# Patient Record
Sex: FEMALE | Race: WHITE | NOT HISPANIC OR LATINO | Employment: OTHER | URBAN - METROPOLITAN AREA
[De-identification: names, ages, dates, MRNs, and addresses within clinical notes are randomized per-mention and may not be internally consistent; named-entity substitution may affect disease eponyms.]

---

## 2023-10-04 ENCOUNTER — OFFICE VISIT (OUTPATIENT)
Dept: PRIMARY CARE | Facility: CLINIC | Age: 78
End: 2023-10-04
Payer: MEDICARE

## 2023-10-04 VITALS
HEIGHT: 64 IN | HEART RATE: 63 BPM | RESPIRATION RATE: 16 BRPM | DIASTOLIC BLOOD PRESSURE: 90 MMHG | SYSTOLIC BLOOD PRESSURE: 143 MMHG | WEIGHT: 118 LBS | OXYGEN SATURATION: 83 % | BODY MASS INDEX: 20.14 KG/M2 | TEMPERATURE: 96.8 F

## 2023-10-04 DIAGNOSIS — R91.8 LUNG NODULES: ICD-10-CM

## 2023-10-04 DIAGNOSIS — Z11.59 ENCOUNTER FOR HEPATITIS C SCREENING TEST FOR LOW RISK PATIENT: ICD-10-CM

## 2023-10-04 DIAGNOSIS — Z86.711 HISTORY OF PULMONARY EMBOLISM: ICD-10-CM

## 2023-10-04 DIAGNOSIS — E78.2 MIXED HYPERLIPIDEMIA: ICD-10-CM

## 2023-10-04 DIAGNOSIS — J43.9 PULMONARY EMPHYSEMA, UNSPECIFIED EMPHYSEMA TYPE (MULTI): ICD-10-CM

## 2023-10-04 DIAGNOSIS — Z95.5 PRESENCE OF STENT IN CORONARY ARTERY: ICD-10-CM

## 2023-10-04 DIAGNOSIS — F17.210 CIGARETTE SMOKER: ICD-10-CM

## 2023-10-04 DIAGNOSIS — K51.90 ULCERATIVE COLITIS WITHOUT COMPLICATIONS, UNSPECIFIED LOCATION (MULTI): ICD-10-CM

## 2023-10-04 DIAGNOSIS — I10 ESSENTIAL (PRIMARY) HYPERTENSION: Primary | ICD-10-CM

## 2023-10-04 DIAGNOSIS — Z23 NEED FOR VACCINATION: ICD-10-CM

## 2023-10-04 DIAGNOSIS — I25.10 ATHEROSCLEROSIS OF NATIVE CORONARY ARTERY OF NATIVE HEART WITHOUT ANGINA PECTORIS: ICD-10-CM

## 2023-10-04 PROBLEM — J44.9 CHRONIC OBSTRUCTIVE PULMONARY DISEASE, UNSPECIFIED (MULTI): Status: ACTIVE | Noted: 2021-10-11

## 2023-10-04 PROBLEM — S01.111S EYEBROW LACERATION, RIGHT, SEQUELA: Status: RESOLVED | Noted: 2023-10-04 | Resolved: 2023-10-04

## 2023-10-04 PROBLEM — K59.00 CONSTIPATION: Status: RESOLVED | Noted: 2017-05-09 | Resolved: 2023-10-04

## 2023-10-04 PROBLEM — R26.2 DIFFICULTY IN WALKING, NOT ELSEWHERE CLASSIFIED: Status: ACTIVE | Noted: 2021-10-11

## 2023-10-04 PROBLEM — S82.401S FRACTURE TIBIA/FIBULA, RIGHT, SEQUELA: Status: RESOLVED | Noted: 2021-10-11 | Resolved: 2023-10-04

## 2023-10-04 PROBLEM — R06.02 SHORTNESS OF BREATH: Status: ACTIVE | Noted: 2021-10-11

## 2023-10-04 PROBLEM — N20.0 KIDNEY STONES: Status: ACTIVE | Noted: 2017-05-09

## 2023-10-04 PROBLEM — E78.5 HYPERLIPIDEMIA, UNSPECIFIED: Status: ACTIVE | Noted: 2021-10-11

## 2023-10-04 PROBLEM — N20.0 KIDNEY STONES: Status: RESOLVED | Noted: 2017-05-09 | Resolved: 2023-10-04

## 2023-10-04 PROBLEM — R06.02 SHORTNESS OF BREATH: Status: RESOLVED | Noted: 2021-10-11 | Resolved: 2023-10-04

## 2023-10-04 PROBLEM — S82.201S FRACTURE TIBIA/FIBULA, RIGHT, SEQUELA: Status: RESOLVED | Noted: 2021-10-11 | Resolved: 2023-10-04

## 2023-10-04 PROCEDURE — 1159F MED LIST DOCD IN RCRD: CPT | Performed by: FAMILY MEDICINE

## 2023-10-04 PROCEDURE — 3077F SYST BP >= 140 MM HG: CPT | Performed by: FAMILY MEDICINE

## 2023-10-04 PROCEDURE — 99203 OFFICE O/P NEW LOW 30 MIN: CPT | Performed by: FAMILY MEDICINE

## 2023-10-04 PROCEDURE — 3080F DIAST BP >= 90 MM HG: CPT | Performed by: FAMILY MEDICINE

## 2023-10-04 RX ORDER — METOPROLOL TARTRATE 50 MG/1
50 TABLET ORAL 2 TIMES DAILY
COMMUNITY

## 2023-10-04 RX ORDER — ISOSORBIDE MONONITRATE 30 MG/1
30 TABLET, EXTENDED RELEASE ORAL DAILY
COMMUNITY

## 2023-10-04 RX ORDER — BUDESONIDE 3 MG/1
3 CAPSULE, COATED PELLETS ORAL 3 TIMES DAILY
COMMUNITY
End: 2023-11-09 | Stop reason: SDUPTHER

## 2023-10-04 RX ORDER — PRAVASTATIN SODIUM 40 MG/1
40 TABLET ORAL DAILY
COMMUNITY

## 2023-10-04 RX ORDER — MULTIVITAMIN
1 TABLET ORAL
COMMUNITY
End: 2023-12-14 | Stop reason: ENTERED-IN-ERROR

## 2023-10-04 RX ORDER — APIXABAN 5 MG/1
5 TABLET, FILM COATED ORAL 2 TIMES DAILY
COMMUNITY
End: 2023-11-03 | Stop reason: WASHOUT

## 2023-10-04 ASSESSMENT — ENCOUNTER SYMPTOMS
NAUSEA: 0
MYALGIAS: 0
TROUBLE SWALLOWING: 0
POLYDIPSIA: 0
UNEXPECTED WEIGHT CHANGE: 0
SHORTNESS OF BREATH: 0
APPETITE CHANGE: 0
PALPITATIONS: 0
DIARRHEA: 0
FATIGUE: 0
DIZZINESS: 0
POLYPHAGIA: 0
WHEEZING: 1
HEADACHES: 0
DEPRESSION: 0
LOSS OF SENSATION IN FEET: 0
OCCASIONAL FEELINGS OF UNSTEADINESS: 0

## 2023-10-04 NOTE — PROGRESS NOTES
Subjective   Patient ID: Abigail Aquino is a 78 y.o. female who presents for Hospital Follow-up (Establish care).    New patient to practice. Hospital follow up. Hospitalized 8/26 to 8/30. Discharge summary reviewed.     She had Acute on Chronic Respiratory Failure, COPD exacerbation. Was also in CHF> Not using her home oxygen. She has issues affording things because do not have Part B. She saw counselor yesterday and told her qualify for Medicaid. She has paperwork started. Is to have lung nodule evaluated with PET but does not want to get done until insured.     CAD, Jan 2009 had heart attack and had stent. No heart attacks since then. Did have some CHF in hospital. Echo done August 2023. Does not have a cardiologist currently. Echo was normal.     Had blood clots after broke leg. She is on Eliquis for that. This was in October 2021. She has not been taken off. CT chest 10/6/21 no PE and CT chest 8/27/23 no clot.                        Current Outpatient Medications:     albuterol 90 mcg/actuation inhaler, INHALE 2 PUFFS BY MOUTH FOUR TIMES A DAY AS NEEDED FOR SHORTNESS OF BREATH, Disp: 18 g, Rfl: 1    budesonide EC (Entocort EC) 3 mg 24 hr capsule, Take 1 capsule (3 mg) by mouth 3 times a day., Disp: , Rfl:     Eliquis 5 mg tablet, Take 1 tablet (5 mg) by mouth 2 times a day., Disp: , Rfl:     furosemide (Lasix) 20 mg tablet, TAKE 1 TABLET BY MOUTH ONCE DAILY, Disp: 30 tablet, Rfl: 1    isosorbide mononitrate ER (Imdur) 30 mg 24 hr tablet, Take 1 tablet (30 mg) by mouth once daily., Disp: , Rfl:     metoprolol tartrate (Lopressor) 50 mg tablet, Take 1 tablet by mouth 2 times a day., Disp: , Rfl:     multivitamin tablet, Take 1 tablet by mouth once daily., Disp: , Rfl:     pravastatin (Pravachol) 40 mg tablet, Take 1 tablet (40 mg) by mouth once daily., Disp: , Rfl:     Symbicort 160-4.5 mcg/actuation inhaler, INHALE 2 PUFFS BY MOUTH TWO TIMES A DAY, Disp: 10.2 g, Rfl: 1    Patient Active Problem List  "  Diagnosis    Atherosclerotic heart disease of native coronary artery without angina pectoris    Chronic obstructive pulmonary disease, unspecified (CMS/HCC)    Difficulty in walking, not elsewhere classified    Essential (primary) hypertension    Hyperlipidemia, unspecified    Ulcerative colitis, unspecified, without complications (CMS/HCC)    Presence of stent in coronary artery    History of pulmonary embolism    Cigarette smoker         Review of Systems   Constitutional:  Negative for appetite change, fatigue and unexpected weight change.   HENT:  Negative for trouble swallowing.    Respiratory:  Positive for wheezing. Negative for shortness of breath.    Cardiovascular:  Negative for chest pain, palpitations and leg swelling.   Gastrointestinal:  Negative for diarrhea and nausea.   Endocrine: Negative for cold intolerance, heat intolerance, polydipsia, polyphagia and polyuria.   Musculoskeletal:  Negative for myalgias.   Skin:  Negative for rash.   Neurological:  Negative for dizziness and headaches.       Objective   /90   Pulse 63   Temp 36 °C (96.8 °F)   Resp 16   Ht 1.626 m (5' 4\")   Wt 53.5 kg (118 lb)   SpO2 (!) 83%   BMI 20.25 kg/m²     Physical Exam  Vitals reviewed.   Constitutional:       General: She is not in acute distress.     Appearance: She is not ill-appearing.   Neck:      Vascular: No carotid bruit.   Cardiovascular:      Rate and Rhythm: Normal rate and regular rhythm.      Pulses: Normal pulses.      Heart sounds: No murmur heard.  Pulmonary:      Effort: Pulmonary effort is normal.      Breath sounds: Normal breath sounds.   Musculoskeletal:      Left lower leg: No edema.   Skin:     Findings: No rash.   Neurological:      Mental Status: She is alert.   Psychiatric:         Mood and Affect: Mood normal.         Assessment/Plan   Problem List Items Addressed This Visit       Atherosclerotic heart disease of native coronary artery without angina pectoris     History MI 2009, " severe coronary artery caldicfications on CT chest. No symptoms but does have shortness of breath that has been presumed to be due to Chronic Respiratory Failure with hyposemia. Had elevated BNP and swelling while respiratory failure was decompensated in hospital. Echo showed Normal ejection fraction. Discussed with patient importance of using her oxygen to prevent diastolic CHF/cor pulmonale.     On IsosorbideER 30 mg daily, Furosemide 20 mg daily, Pravastatin. No seen cardiology in one year. Cardiologist was in [place]. She needs new cardiologist.            Relevant Medications    isosorbide mononitrate ER (Imdur) 30 mg 24 hr tablet    metoprolol tartrate (Lopressor) 50 mg tablet    Other Relevant Orders    Referral to Cardiology    Chronic obstructive pulmonary disease, unspecified (CMS/HCC)     Was started on [id number] 2 puffs 2 times a day. She is using it 2 times a day. Helps her breathing. She uses Albuterol 2 puffs 4 times a day as needed. She does need it every day. She sees Karon Thornton in pulmonology.          Essential (primary) hypertension - Primary     On Metoprolol tartrate due to CAD. BP not usually high per patient. Reasses in couple weeks.          Relevant Orders    Referral to Cardiology    Comprehensive Metabolic Panel    Hyperlipidemia, unspecified    Relevant Orders    Referral to Cardiology    Lipid Panel    Comprehensive Metabolic Panel    Ulcerative colitis, unspecified, without complications (CMS/HCC)     Diagnosed 2009, has been on Budesonide. Last GI follow up was in 2016. She has not had follow up. She needs new gastro in local area per patient. Prior was in [place].     She feels Budesonide not working. Has a lot of gas then defecates mucous. She agreed to see GI.          Relevant Orders    Referral to Gastroenterology    Presence of stent in coronary artery    Relevant Orders    Referral to Cardiology    History of pulmonary embolism     Reviewed old records. 10/6/2021,  had CT contrast that showed no PE but did show nodules. Lost to follow up. In Hospital 8/26, repeat CT with increase in lung mass and no PE. She does have issues with falling and has been on Eliquis since after her fibula fracture in 2021. Do not feel she has indication for long term use and risk outweighs benefit.         Cigarette smoker     Started 1956. Has cut back to intermittent only. 2 ppd until recently. 67 times 2 ppd so 124 pack years.  Smoked every day until 5 weeks ago. Now only couple a week. Discussed nicotine replacement. She does not want at this time. Discussed need to keep off it.           Other Visit Diagnoses       Need for vaccination        Has no part B, will go to pharmacy to get on part D.    Encounter for hepatitis C screening test for low risk patient        Relevant Orders    Hepatitis C Antibody              Assessment, plans, tests, and follow up discussed with patient and patient verbalized understanding. Abigail was given an opportunity to ask questions and  any concerns were addressed including but not limited to medications, need for preventive care, importance of Oxygen use. .

## 2023-10-04 NOTE — PATIENT INSTRUCTIONS
Get Prevnar 15 and High dose flu    Get blood tests when can.   Follow up up in 2 months. [facility].     You have referral to GI doctor about ulcerative colitis.   You have referral to Cardiology Dr. Carrillo about heart disease.     OK to stop Eliquis.

## 2023-10-04 NOTE — ASSESSMENT & PLAN NOTE
History MI 2009, severe coronary artery caldicfications on CT chest. No symptoms but does have shortness of breath that has been presumed to be due to Chronic Respiratory Failure with hyposemia. Had elevated BNP and swelling while respiratory failure was decompensated in hospital. Echo showed Normal ejection fraction. Discussed with patient importance of using her oxygen to prevent diastolic CHF/cor pulmonale.     On IsosorbideER 30 mg daily, Furosemide 20 mg daily, Pravastatin. No seen cardiology in one year. Cardiologist was in [place]. She needs new cardiologist.

## 2023-10-04 NOTE — ASSESSMENT & PLAN NOTE
Reviewed old records. 10/6/2021, had CT contrast that showed no PE but did show nodules. Lost to follow up. In Hospital 8/26, repeat CT with increase in lung mass and no PE. She does have issues with falling and has been on Eliquis since after her fibula fracture in 2021. Do not feel she has indication for long term use and risk outweighs benefit.

## 2023-10-04 NOTE — ASSESSMENT & PLAN NOTE
>>ASSESSMENT AND PLAN FOR ESSENTIAL (PRIMARY) HYPERTENSION WRITTEN ON 10/4/2023 11:51 AM BY GUERLINE SMITH MD    On Metoprolol tartrate due to CAD. BP not usually high per patient. Reasses in couple weeks.

## 2023-10-04 NOTE — ASSESSMENT & PLAN NOTE
Started 1956. Has cut back to intermittent only. 2 ppd until recently. 67 times 2 ppd so 124 pack years.  Smoked every day until 5 weeks ago. Now only couple a week. Discussed nicotine replacement. She does not want at this time. Discussed need to keep off it.

## 2023-10-04 NOTE — ASSESSMENT & PLAN NOTE
Was started on [id number] 2 puffs 2 times a day. She is using it 2 times a day. Helps her breathing. She uses Albuterol 2 puffs 4 times a day as needed. She does need it every day. She sees Karon Thornton in pulmonology.

## 2023-10-04 NOTE — ASSESSMENT & PLAN NOTE
Diagnosed 2009, has been on Budesonide. Last GI follow up was in 2016. She has not had follow up. She needs new gastro in local area per patient. Prior was in [place].     She feels Budesonide not working. Has a lot of gas then defecates mucous. She agreed to see GI.

## 2023-10-23 ENCOUNTER — TELEPHONE (OUTPATIENT)
Dept: PRIMARY CARE | Facility: CLINIC | Age: 78
End: 2023-10-23
Payer: MEDICARE

## 2023-10-23 DIAGNOSIS — I25.10 ATHEROSCLEROSIS OF NATIVE CORONARY ARTERY WITHOUT ANGINA PECTORIS, UNSPECIFIED WHETHER NATIVE OR TRANSPLANTED HEART: ICD-10-CM

## 2023-10-23 DIAGNOSIS — J43.9 PULMONARY EMPHYSEMA, UNSPECIFIED EMPHYSEMA TYPE (MULTI): ICD-10-CM

## 2023-10-23 RX ORDER — FUROSEMIDE 20 MG/1
20 TABLET ORAL DAILY
Qty: 90 TABLET | Refills: 0 | Status: SHIPPED | OUTPATIENT
Start: 2023-10-23 | End: 2024-01-23 | Stop reason: SDUPTHER

## 2023-10-23 RX ORDER — BUDESONIDE AND FORMOTEROL FUMARATE DIHYDRATE 160; 4.5 UG/1; UG/1
2 AEROSOL RESPIRATORY (INHALATION) 2 TIMES DAILY
Qty: 3 EACH | Refills: 0 | Status: SHIPPED | OUTPATIENT
Start: 2023-10-23 | End: 2023-11-13 | Stop reason: SDUPTHER

## 2023-10-23 NOTE — TELEPHONE ENCOUNTER
Rx Refill Request Telephone Encounter    Name:  Abigail BRYCE Kenia  :  [id number]  Medication Name:  symbicort   160-4.5 mcg  Route : inhalation  twice a day  Quantity : 90 day supply  2 puffs twice a day  Specific Pharmacy location:  [facility]  Date of last appointment:  Oct 4  Date of next appointment:  Dec 13  Best number to reach patient:  [phone]            Rx Refill Request Telephone Encounter    Name:  Abigail Aquino  :  [id number]  Medication Name:  Furosemide   Dose : 20 mg  Route : oral  Frequency : daily  90    Specific Pharmacy location:    Date of last appointment:    Date of next appointment:    Best number to reach patient:

## 2023-11-03 ENCOUNTER — OFFICE VISIT (OUTPATIENT)
Dept: CARDIOLOGY | Facility: CLINIC | Age: 78
End: 2023-11-03
Payer: MEDICARE

## 2023-11-03 VITALS
SYSTOLIC BLOOD PRESSURE: 116 MMHG | HEART RATE: 67 BPM | DIASTOLIC BLOOD PRESSURE: 68 MMHG | WEIGHT: 119 LBS | BODY MASS INDEX: 20.32 KG/M2 | HEIGHT: 64 IN

## 2023-11-03 DIAGNOSIS — E78.2 MIXED HYPERLIPIDEMIA: ICD-10-CM

## 2023-11-03 DIAGNOSIS — I25.10 ATHEROSCLEROSIS OF NATIVE CORONARY ARTERY OF NATIVE HEART WITHOUT ANGINA PECTORIS: ICD-10-CM

## 2023-11-03 DIAGNOSIS — I10 ESSENTIAL (PRIMARY) HYPERTENSION: ICD-10-CM

## 2023-11-03 DIAGNOSIS — Z95.5 PRESENCE OF STENT IN CORONARY ARTERY: ICD-10-CM

## 2023-11-03 PROCEDURE — 1159F MED LIST DOCD IN RCRD: CPT | Performed by: INTERNAL MEDICINE

## 2023-11-03 PROCEDURE — 1160F RVW MEDS BY RX/DR IN RCRD: CPT | Performed by: INTERNAL MEDICINE

## 2023-11-03 PROCEDURE — 93000 ELECTROCARDIOGRAM COMPLETE: CPT | Performed by: INTERNAL MEDICINE

## 2023-11-03 PROCEDURE — 99203 OFFICE O/P NEW LOW 30 MIN: CPT | Performed by: INTERNAL MEDICINE

## 2023-11-03 PROCEDURE — 3078F DIAST BP <80 MM HG: CPT | Performed by: INTERNAL MEDICINE

## 2023-11-03 PROCEDURE — 3074F SYST BP LT 130 MM HG: CPT | Performed by: INTERNAL MEDICINE

## 2023-11-03 ASSESSMENT — ENCOUNTER SYMPTOMS
SHORTNESS OF BREATH: 1
OCCASIONAL FEELINGS OF UNSTEADINESS: 1
DEPRESSION: 0
LOSS OF SENSATION IN FEET: 0

## 2023-11-03 NOTE — LETTER
November 3, 2023     Vikki Marquez MD  [address]  [address] [place] [id number]    Patient: Abigail Aquino   YOB: 1945   Date of Visit: 11/3/2023       Dear Dr. Vikki Marquez MD:    Thank you for referring Abigail Aquino to me for evaluation. Below are my notes for this consultation.  If you have questions, please do not hesitate to call me. I look forward to following your patient along with you.       Sincerely,     Bobo Carrillo MD      CC: No Recipients  ______________________________________________________________________________________    Counseling:  The patient was counseled regarding diagnostic results, instructions for management, risk factor reductions, prognosis, patient and family education, impressions, risks and benefits of treatment options and importance of compliance with treatment.      Chief Complaint:   The patient presents today for cardiovascular evaluation of HTN, hyperlipidemia and CAD.      History Of Present Illness:    Abigail Aquino is a 78 y.o. female patient whose PMH is significant for CAD s/p PCI x1 in 2009, h/o acute PE in 11/2021, hyperlipidemia, HTN, ulcerative colitis, COPD and lung nodules. She presents today to establish cardiovascular care for the evaluation and management of HTN, hyperlipidemia and CAD. The patient was recently admitted to hospital in 08/2023 for evaluation of SOB. While in hospital, echocardiogram demonstrated an EF of 60-65%, and CT of the chest was negative for PE but with a 2.7 x 1.1 cm spiculated left lower lobe pulmonary nodule (enlarged from 11/15/2021, highly suspicious for malignancy) and mild bronchial wall thickening and mucous impaction versus aspiration in the right lower lobe. The patient states that she previously followed with Dr. MARCEL Chan for management of CAD, but he has since retired. She denies any CP or chest discomfort. She reports SOB and is currently on home oxygen, and is being  "followed by pulmonology with a PET scan pending. The patient is compliant with her prescribed medications.        Last Recorded Vitals:  Vitals:    11/03/23 1559   BP: 116/68   BP Location: Right arm   Pulse: 67   Weight: 54 kg (119 lb)   Height: 1.626 m (5' 4\")       Past Surgical History:  She has a past surgical history that includes Tonsillectomy and Appendectomy.      Social History:  She reports that she quit smoking about 2 months ago. Her smoking use included cigarettes. She has been exposed to tobacco smoke. She has never used smokeless tobacco. She reports that she does not currently use alcohol. She reports that she does not use drugs.    Family History:  Family History   Problem Relation Name Age of Onset   • Cancer Daughter          Allergies:  Atorvastatin    Outpatient Medications:  Current Outpatient Medications   Medication Instructions   • albuterol 90 mcg/actuation inhaler INHALE 2 PUFFS BY MOUTH FOUR TIMES A DAY AS NEEDED FOR SHORTNESS OF BREATH   • budesonide EC (ENTOCORT EC) 3 mg, oral, 3 times daily   • Eliquis 5 mg, oral, 2 times daily   • furosemide (Lasix) 20 mg tablet TAKE 1 TABLET BY MOUTH ONCE DAILY   • isosorbide mononitrate ER (IMDUR) 30 mg, oral, Daily   • metoprolol tartrate (LOPRESSOR) 50 mg, oral, 2 times daily   • multivitamin tablet 1 tablet, oral, Daily RT   • pravastatin (PRAVACHOL) 40 mg, oral, Daily   • Symbicort 160-4.5 mcg/actuation inhaler INHALE 2 PUFFS BY MOUTH TWO TIMES A DAY     Review of Systems   Respiratory:  Positive for shortness of breath.    All other systems reviewed and are negative.     Physical Exam:  Constitutional:       Appearance: Healthy appearance. Not in distress.   Neck:      Vascular: No JVR. JVD normal.   Pulmonary:      Effort: Pulmonary effort is normal.      Breath sounds: Normal breath sounds. No wheezing. No rhonchi. No rales.   Chest:      Chest wall: Not tender to palpatation.   Cardiovascular:      PMI at left midclavicular line. Normal " rate. Regular rhythm. Normal S1. Normal S2.       Murmurs: There is no murmur.      No gallop.  No click. No rub.   Pulses:     Intact distal pulses.   Edema:     Peripheral edema absent.   Abdominal:      General: Bowel sounds are normal.      Palpations: Abdomen is soft.      Tenderness: There is no abdominal tenderness.   Musculoskeletal: Normal range of motion.         General: No tenderness. Skin:     General: Skin is warm and dry.   Neurological:      General: No focal deficit present.      Mental Status: Alert and oriented to person, place and time.       Last Labs:  CBC -  Lab Results   Component Value Date    WBC 8.5 08/28/2023    HGB 15.6 08/28/2023    HCT 48.2 (H) 08/28/2023     (H) 08/28/2023     08/28/2023       CMP -  Lab Results   Component Value Date    CALCIUM 8.4 (L) 08/28/2023    PHOS 2.8 10/11/2021    PROT 5.6 (L) 08/26/2023    ALBUMIN 3.7 08/26/2023    AST 27 08/26/2023    ALT 26 08/26/2023    ALKPHOS 63 08/26/2023    BILITOT 0.7 08/26/2023       RENAL FUNCTION PANEL -   Lab Results   Component Value Date    GLUCOSE 166 (H) 08/28/2023     08/28/2023    K 4.6 08/28/2023     08/28/2023    CO2 37 (H) 08/28/2023    ANIONGAP 8 (L) 08/28/2023    BUN 15 08/28/2023    CREATININE 0.64 08/28/2023    CALCIUM 8.4 (L) 08/28/2023    PHOS 2.8 10/11/2021    ALBUMIN 3.7 08/26/2023        Lab Results   Component Value Date     (H) 08/26/2023       Last Cardiology Tests:  08/28/2023 - TTE  Left ventricular systolic function is normal with a 60-65% estimated ejection fraction.     08/27/2023 - CTA Chest for PE  1. No acute pulmonary embolism.  2. 2.7 x 1.1 cm spiculated left lower lobe pulmonary nodule, enlarged from 11/15/2021, highly suspicious for malignancy.  3. Mild bronchial wall thickening and mucous impaction versus aspiration in the right lower lobe.    11/16/2021 - TTE  The left ventricular systolic function is normal with a 55-60% estimated ejection fraction.      Assessment/Plan  1) CAD   H/O MI in 2009 s/p PCI x1  On metoprolol tartrate 50 mg BID, isosorbide 30 mg daily, Lasix 20 mg daily   Hospital admission 08/2023 for evaluation of SOB  TTE with LVEF 60-65%  CTA chest negative for PE, 2.7 x 1.1 cm spiculated left lower lobe pulmonary nodule (enlarged from 11/15/2021, highly suspicious for malignancy) and mild bronchial wall thickening and mucous impaction versus aspiration in the right lower lobe  Patient denies CP or chest discomfort  Reports SOB - being worked up by pulmonology (see below)  Disability documentation provided   Followup with Lydia Madden NP, in 6 months    2) Hyperlipidemia  On pravastatin 40 mg daily    3) HTN  Stable  On metoprolol tartrate 50 mg daily    4) H/O PE 11/2021  Occurred in the setting of a LE fracture  No longer on Eliquis    5) Lung Nodule  Hospital admission 08/2023 for evaluation of SOB  CTA chest with 2.7 x 1.1 cm spiculated left lower lobe pulmonary nodule (enlarged from 11/15/2021, highly suspicious for malignancy) and mild bronchial wall thickening and mucous impaction versus aspiration in the right lower lobe  Being followed by pulmonology with PET pending  On home oxygen  Advised to followup with pulmonology regarding portable oxygen        Scribe Attestation  By signing my name below, I, Paola Brower   attest that this documentation has been prepared under the direction and in the presence of Bobo Carrillo MD.

## 2023-11-03 NOTE — PATIENT INSTRUCTIONS
Continue all current medications as prescribed.   Dr. Carrillo has recommended that you followup with your pulmonologist regarding your oxygen.   Documentation for a disability placard has been provided to you today.   Followup with Lydia Madden NP, in 6 months.      If you have any questions or cardiac concerns, please call our office at [phone].

## 2023-11-03 NOTE — PROGRESS NOTES
"Counseling:  The patient was counseled regarding diagnostic results, instructions for management, risk factor reductions, prognosis, patient and family education, impressions, risks and benefits of treatment options and importance of compliance with treatment.      Chief Complaint:   The patient presents today for cardiovascular evaluation of HTN, hyperlipidemia and CAD.      History Of Present Illness:    Abigail Aquino is a 78 y.o. female patient whose PMH is significant for CAD s/p PCI x1 in 2009, h/o acute PE in 11/2021, hyperlipidemia, HTN, ulcerative colitis, COPD and lung nodules. She presents today to establish cardiovascular care for the evaluation and management of HTN, hyperlipidemia and CAD. The patient was recently admitted to hospital in 08/2023 for evaluation of SOB. While in hospital, echocardiogram demonstrated an EF of 60-65%, and CT of the chest was negative for PE but with a 2.7 x 1.1 cm spiculated left lower lobe pulmonary nodule (enlarged from 11/15/2021, highly suspicious for malignancy) and mild bronchial wall thickening and mucous impaction versus aspiration in the right lower lobe. The patient states that she previously followed with Dr. MARCEL Chan for management of CAD, but he has since retired. She denies any CP or chest discomfort. She reports SOB and is currently on home oxygen, and is being followed by pulmonology with a PET scan pending. The patient is compliant with her prescribed medications.        Last Recorded Vitals:  Vitals:    11/03/23 1559   BP: 116/68   BP Location: Right arm   Pulse: 67   Weight: 54 kg (119 lb)   Height: 1.626 m (5' 4\")       Past Surgical History:  She has a past surgical history that includes Tonsillectomy and Appendectomy.      Social History:  She reports that she quit smoking about 2 months ago. Her smoking use included cigarettes. She has been exposed to tobacco smoke. She has never used smokeless tobacco. She reports that she does not currently use " alcohol. She reports that she does not use drugs.    Family History:  Family History   Problem Relation Name Age of Onset    Cancer Daughter          Allergies:  Atorvastatin    Outpatient Medications:  Current Outpatient Medications   Medication Instructions    albuterol 90 mcg/actuation inhaler INHALE 2 PUFFS BY MOUTH FOUR TIMES A DAY AS NEEDED FOR SHORTNESS OF BREATH    budesonide EC (ENTOCORT EC) 3 mg, oral, 3 times daily    Eliquis 5 mg, oral, 2 times daily    furosemide (Lasix) 20 mg tablet TAKE 1 TABLET BY MOUTH ONCE DAILY    isosorbide mononitrate ER (IMDUR) 30 mg, oral, Daily    metoprolol tartrate (LOPRESSOR) 50 mg, oral, 2 times daily    multivitamin tablet 1 tablet, oral, Daily RT    pravastatin (PRAVACHOL) 40 mg, oral, Daily    Symbicort 160-4.5 mcg/actuation inhaler INHALE 2 PUFFS BY MOUTH TWO TIMES A DAY     Review of Systems   Respiratory:  Positive for shortness of breath.    All other systems reviewed and are negative.     Physical Exam:  Constitutional:       Appearance: Healthy appearance. Not in distress.   Neck:      Vascular: No JVR. JVD normal.   Pulmonary:      Effort: Pulmonary effort is normal.      Breath sounds: Normal breath sounds. No wheezing. No rhonchi. No rales.   Chest:      Chest wall: Not tender to palpatation.   Cardiovascular:      PMI at left midclavicular line. Normal rate. Regular rhythm. Normal S1. Normal S2.       Murmurs: There is no murmur.      No gallop.  No click. No rub.   Pulses:     Intact distal pulses.   Edema:     Peripheral edema absent.   Abdominal:      General: Bowel sounds are normal.      Palpations: Abdomen is soft.      Tenderness: There is no abdominal tenderness.   Musculoskeletal: Normal range of motion.         General: No tenderness. Skin:     General: Skin is warm and dry.   Neurological:      General: No focal deficit present.      Mental Status: Alert and oriented to person, place and time.       Last Labs:  CBC -  Lab Results   Component Value  Date    WBC 8.5 08/28/2023    HGB 15.6 08/28/2023    HCT 48.2 (H) 08/28/2023     (H) 08/28/2023     08/28/2023       CMP -  Lab Results   Component Value Date    CALCIUM 8.4 (L) 08/28/2023    PHOS 2.8 10/11/2021    PROT 5.6 (L) 08/26/2023    ALBUMIN 3.7 08/26/2023    AST 27 08/26/2023    ALT 26 08/26/2023    ALKPHOS 63 08/26/2023    BILITOT 0.7 08/26/2023       RENAL FUNCTION PANEL -   Lab Results   Component Value Date    GLUCOSE 166 (H) 08/28/2023     08/28/2023    K 4.6 08/28/2023     08/28/2023    CO2 37 (H) 08/28/2023    ANIONGAP 8 (L) 08/28/2023    BUN 15 08/28/2023    CREATININE 0.64 08/28/2023    CALCIUM 8.4 (L) 08/28/2023    PHOS 2.8 10/11/2021    ALBUMIN 3.7 08/26/2023        Lab Results   Component Value Date     (H) 08/26/2023       Last Cardiology Tests:  08/28/2023 - TTE  Left ventricular systolic function is normal with a 60-65% estimated ejection fraction.     08/27/2023 - CTA Chest for PE  1. No acute pulmonary embolism.  2. 2.7 x 1.1 cm spiculated left lower lobe pulmonary nodule, enlarged from 11/15/2021, highly suspicious for malignancy.  3. Mild bronchial wall thickening and mucous impaction versus aspiration in the right lower lobe.    11/16/2021 - TTE  The left ventricular systolic function is normal with a 55-60% estimated ejection fraction.     Assessment/Plan   1) CAD   H/O MI in 2009 s/p PCI x1  On metoprolol tartrate 50 mg BID, isosorbide 30 mg daily, Lasix 20 mg daily   Hospital admission 08/2023 for evaluation of SOB  TTE with LVEF 60-65%  CTA chest negative for PE, 2.7 x 1.1 cm spiculated left lower lobe pulmonary nodule (enlarged from 11/15/2021, highly suspicious for malignancy) and mild bronchial wall thickening and mucous impaction versus aspiration in the right lower lobe  Patient denies CP or chest discomfort  Reports SOB - being worked up by pulmonology (see below)  Disability documentation provided   Followup with Lydia Madden NP, in 6  months    2) Hyperlipidemia  On pravastatin 40 mg daily    3) HTN  Stable  On metoprolol tartrate 50 mg daily    4) H/O PE 11/2021  Occurred in the setting of a LE fracture  No longer on Eliquis    5) Lung Nodule  Hospital admission 08/2023 for evaluation of SOB  CTA chest with 2.7 x 1.1 cm spiculated left lower lobe pulmonary nodule (enlarged from 11/15/2021, highly suspicious for malignancy) and mild bronchial wall thickening and mucous impaction versus aspiration in the right lower lobe  Being followed by pulmonology with PET pending  On home oxygen  Advised to followup with pulmonology regarding portable oxygen        Scribe Attestation  By signing my name below, I, Paola Brower   attest that this documentation has been prepared under the direction and in the presence of Bobo Carrillo MD.

## 2023-11-08 DIAGNOSIS — R91.8 LUNG NODULES: Primary | ICD-10-CM

## 2023-11-08 DIAGNOSIS — C34.90 MALIGNANT NEOPLASM OF LUNG, UNSPECIFIED LATERALITY, UNSPECIFIED PART OF LUNG (MULTI): ICD-10-CM

## 2023-11-09 ENCOUNTER — TELEPHONE (OUTPATIENT)
Dept: PULMONOLOGY | Facility: HOSPITAL | Age: 78
End: 2023-11-09
Payer: MEDICARE

## 2023-11-09 DIAGNOSIS — K51.90 ULCERATIVE COLITIS WITHOUT COMPLICATIONS, UNSPECIFIED LOCATION (MULTI): Primary | ICD-10-CM

## 2023-11-09 DIAGNOSIS — J43.9 PULMONARY EMPHYSEMA, UNSPECIFIED EMPHYSEMA TYPE (MULTI): ICD-10-CM

## 2023-11-09 NOTE — TELEPHONE ENCOUNTER
Rx Refill Request Telephone Encounter    Name:  Abigail Laralamar  :  [id number]  Medication Name:  Budesonide  3 mg  oral  three times a day  270    Specific Pharmacy location:  [facility]  Date of last appointment:  Oct 4  Date of next appointment:  Dec 13  Best number to reach patient:  [phone]

## 2023-11-09 NOTE — TELEPHONE ENCOUNTER
Patient is scheduled for PET scan on 11/17 at 1:15 PM. Patient was given the Prep. Patient acknowledged understanding.     ----- Message from DONOVAN Ley sent at 11/8/2023  1:27 PM EST -----  ordered  ----- Message -----  From: Dana Staples RN  Sent: 11/8/2023   9:13 AM EST  To: DONOVAN Ley    Patient called in stating she got new insurance and would like to proceed with getting the PET scan. Can you place a new order for this?

## 2023-11-13 ENCOUNTER — TELEPHONE (OUTPATIENT)
Dept: PRIMARY CARE | Facility: CLINIC | Age: 78
End: 2023-11-13
Payer: MEDICARE

## 2023-11-13 DIAGNOSIS — J43.9 PULMONARY EMPHYSEMA, UNSPECIFIED EMPHYSEMA TYPE (MULTI): ICD-10-CM

## 2023-11-13 RX ORDER — BUDESONIDE AND FORMOTEROL FUMARATE DIHYDRATE 160; 4.5 UG/1; UG/1
2 AEROSOL RESPIRATORY (INHALATION) 2 TIMES DAILY
Qty: 3 EACH | Refills: 0 | Status: SHIPPED | OUTPATIENT
Start: 2023-11-13 | End: 2023-12-08 | Stop reason: ALTCHOICE

## 2023-11-13 RX ORDER — BUDESONIDE 3 MG/1
3 CAPSULE, COATED PELLETS ORAL 3 TIMES DAILY
Qty: 270 CAPSULE | Refills: 0 | Status: SHIPPED | OUTPATIENT
Start: 2023-11-13 | End: 2024-01-08

## 2023-11-13 NOTE — TELEPHONE ENCOUNTER
Patient stated that she does not see her new GI doctor until the endo this month and she needs this medication asap because she is out

## 2023-11-13 NOTE — TELEPHONE ENCOUNTER
Rx Refill Request Telephone Encounter    Name:  Abigail Aquino  :  [id number]  Medication Name:        Symbicort 160-4.5 mcg/actuation inhaler [[id number]] inhale 2 puffs by mouth two times a day              Specific Pharmacy location:   [facility]  Date of last appointment:  10/04/2023  Date of next appointment:  2023  Best number to reach patient:  [phone]

## 2023-11-17 ENCOUNTER — LAB (OUTPATIENT)
Dept: LAB | Facility: LAB | Age: 78
End: 2023-11-17
Payer: MEDICARE

## 2023-11-17 ENCOUNTER — HOSPITAL ENCOUNTER (OUTPATIENT)
Dept: RADIOLOGY | Facility: HOSPITAL | Age: 78
End: 2023-11-17
Payer: MEDICARE

## 2023-11-17 ENCOUNTER — HOSPITAL ENCOUNTER (OUTPATIENT)
Dept: RADIOLOGY | Facility: HOSPITAL | Age: 78
Discharge: HOME | End: 2023-11-17
Payer: MEDICARE

## 2023-11-17 DIAGNOSIS — I10 ESSENTIAL (PRIMARY) HYPERTENSION: ICD-10-CM

## 2023-11-17 DIAGNOSIS — E78.2 MIXED HYPERLIPIDEMIA: ICD-10-CM

## 2023-11-17 DIAGNOSIS — C34.90 MALIGNANT NEOPLASM OF LUNG, UNSPECIFIED LATERALITY, UNSPECIFIED PART OF LUNG (MULTI): ICD-10-CM

## 2023-11-17 DIAGNOSIS — Z11.59 ENCOUNTER FOR HEPATITIS C SCREENING TEST FOR LOW RISK PATIENT: ICD-10-CM

## 2023-11-17 DIAGNOSIS — R91.8 LUNG NODULES: ICD-10-CM

## 2023-11-17 LAB
ALBUMIN SERPL BCP-MCNC: 4.1 G/DL (ref 3.4–5)
ALP SERPL-CCNC: 58 U/L (ref 33–136)
ALT SERPL W P-5'-P-CCNC: 12 U/L (ref 7–45)
ANION GAP SERPL CALC-SCNC: 12 MMOL/L (ref 10–20)
AST SERPL W P-5'-P-CCNC: 16 U/L (ref 9–39)
BILIRUB SERPL-MCNC: 0.8 MG/DL (ref 0–1.2)
BUN SERPL-MCNC: 9 MG/DL (ref 6–23)
CALCIUM SERPL-MCNC: 9.1 MG/DL (ref 8.6–10.3)
CHLORIDE SERPL-SCNC: 100 MMOL/L (ref 98–107)
CHOLEST SERPL-MCNC: 183 MG/DL (ref 0–199)
CHOLESTEROL/HDL RATIO: 3
CO2 SERPL-SCNC: 33 MMOL/L (ref 21–32)
CREAT SERPL-MCNC: 0.64 MG/DL (ref 0.5–1.05)
GFR SERPL CREATININE-BSD FRML MDRD: 90 ML/MIN/1.73M*2
GLUCOSE SERPL-MCNC: 98 MG/DL (ref 74–99)
HCV AB SER QL: NONREACTIVE
HDLC SERPL-MCNC: 61.4 MG/DL
LDLC SERPL CALC-MCNC: 92 MG/DL
NON HDL CHOLESTEROL: 122 MG/DL (ref 0–149)
POTASSIUM SERPL-SCNC: 3.9 MMOL/L (ref 3.5–5.3)
PROT SERPL-MCNC: 6 G/DL (ref 6.4–8.2)
SODIUM SERPL-SCNC: 141 MMOL/L (ref 136–145)
TRIGL SERPL-MCNC: 148 MG/DL (ref 0–149)
VLDL: 30 MG/DL (ref 0–40)

## 2023-11-17 PROCEDURE — 80061 LIPID PANEL: CPT

## 2023-11-17 PROCEDURE — 86803 HEPATITIS C AB TEST: CPT

## 2023-11-17 PROCEDURE — 80053 COMPREHEN METABOLIC PANEL: CPT

## 2023-11-17 PROCEDURE — 36415 COLL VENOUS BLD VENIPUNCTURE: CPT

## 2023-11-21 ENCOUNTER — APPOINTMENT (OUTPATIENT)
Dept: GASTROENTEROLOGY | Facility: CLINIC | Age: 78
End: 2023-11-21
Payer: MEDICARE

## 2023-11-22 ENCOUNTER — OFFICE VISIT (OUTPATIENT)
Dept: GASTROENTEROLOGY | Facility: CLINIC | Age: 78
End: 2023-11-22
Payer: MEDICARE

## 2023-11-22 VITALS
DIASTOLIC BLOOD PRESSURE: 84 MMHG | HEART RATE: 77 BPM | BODY MASS INDEX: 19.91 KG/M2 | OXYGEN SATURATION: 86 % | SYSTOLIC BLOOD PRESSURE: 142 MMHG | WEIGHT: 116 LBS

## 2023-11-22 DIAGNOSIS — K51.90 ULCERATIVE COLITIS WITHOUT COMPLICATIONS, UNSPECIFIED LOCATION (MULTI): ICD-10-CM

## 2023-11-22 DIAGNOSIS — K52.832 LYMPHOCYTIC COLITIS: Primary | ICD-10-CM

## 2023-11-22 PROCEDURE — 99203 OFFICE O/P NEW LOW 30 MIN: CPT | Performed by: INTERNAL MEDICINE

## 2023-11-22 PROCEDURE — 1159F MED LIST DOCD IN RCRD: CPT | Performed by: INTERNAL MEDICINE

## 2023-11-22 PROCEDURE — 3079F DIAST BP 80-89 MM HG: CPT | Performed by: INTERNAL MEDICINE

## 2023-11-22 PROCEDURE — 1160F RVW MEDS BY RX/DR IN RCRD: CPT | Performed by: INTERNAL MEDICINE

## 2023-11-22 PROCEDURE — 3077F SYST BP >= 140 MM HG: CPT | Performed by: INTERNAL MEDICINE

## 2023-11-22 NOTE — PROGRESS NOTES
[facility] Gastroenterology    ASSESSMENT and PLAN:        Abigail Aquino is a 78 y.o. female patient whose PMH is significant for CAD s/p PCI x1 in 2009, h/o acute PE in 11/2021, hyperlipidemia, HTN, ulcerative colitis, COPD and lung nodules.   who presents for consultation requested by her primary care provider (Vikki Marquez MD) for the evaluation of lymphocytic colitis    Lymphocytic colitis   - diagnosed in 2017   -[facility] note was reviewed patient with lymphocytic colits on biopsty in 2017   -Patient states she has been maintained on 2 tabs of 3 mg budesonide daily for last several years  -She currently states that she is having good bowel moods at this time on minimal budesonide she states she will take it intermittently when she has loose bowel movements  -She was offered colonoscopy at this time she declines endoscopic evaluation.  -Continue with as needed budesonide  -Follow-up in 6 months        Donny Arizmendi,          Gastroenterology    [facility]            Subjective   HISTORY OF PRESENT ILLNESS:     Chief Complaint  Ulcerative Colitis    History Of Present Illness:    Abigail Aquino is a 78 y.o. female with a significant past medical history of COPD, essential hypertension tension, tobacco abuse in the lymphocytic colitis who presents for consultation requested by her primary care provider (Vikki Marquez MD) for the evaluation of lymphocytic colitis.     Patient is a poor historian and there is questionable history of ulcerative colitis however through extensive chart review colonoscopy from 2017 at [facility] was found and pathology report was also found the patient has history of lymphocytic colitis does budesonide for this intermittently over the last several years.      Overall patient has complaints today.  She denies any overt signs of diarrhea she states she will take the budesonide intermittently so patient takes  usually twice a day.      Patient denies any heartburn/GERD, N/V, dysphagia, odynophagia, abdominal pain, diarrhea, constipation, hematemesis, hematochezia, melena, or weight loss.      Endoscopy History:  Colon Biopsies show lymphocytic colitis but patient was constipated. Reassess symptoms     L DIAGNOSIS    1. Polyp, descending colon, biopsy (A) - Hyperplastic polyp.    2. Random colon, biopsy (B) - Lymphocytic colitis pattern of injury.  - See comment.    [place]/jeff 05/24/2017    COMMENT  2. Sections demonstrate colonic mucosa with a patchy, mild increased  intraepithelial lymphocytosis involving mostly surface epithelium. There is  a patchy minimal irregularity to the subepithelial collagen layer. The  histologic findings are nonspecific and may represent changes of  lymphocytic colitis, evolving collagenous colitis, changes associated with  medications (olmesartan, losartan, NSAIDs, immunotherapeutics, etc), celiac  disease, or resolving colitis, among others. Clinical correlation is  recommended.        ROS   I performed a complete 10 point review of systems and it is negative except as noted in HPI or above.        PAST HISTORIES:       Past Medical History:  She has a past medical history of Clotting disorder (CMS/[facility]ly [facility]), COPD (chronic obstructive pulmonary disease) (CMS/[facility]ly [facility]), Eyebrow laceration, right, sequela (10/04/2023), Fracture tibia/fibula, right, sequela (10/11/2021), Hypertension, and Kidney stones (05/09/2017).    Past Surgical History:  She has a past surgical history that includes Tonsillectomy and Appendectomy.      Social History:  She reports that she quit smoking about 2 months ago. Her smoking use included cigarettes. She has been exposed to tobacco smoke. She has never used smokeless tobacco. She reports that she does not currently use alcohol. She reports that she does not use drugs.    Family History:  No known GI disease, specifically denies pancreatitis, Crohn's, colon cancer,  gastroesophageal cancer, or ulcerative colitis.    Family History   Problem Relation Name Age of Onset    Cancer Daughter          Allergies:  Atorvastatin        Objective   OBJECTIVE:       Last Recorded Vitals:  Vitals:    11/22/23 1054 11/22/23 1101   BP: 142/84    Pulse: (!) 148 77   SpO2: (!) 81% (!) 86%   Weight: 52.6 kg (116 lb)      /84   Pulse 77   Wt 52.6 kg (116 lb)   SpO2 (!) 86%   BMI 19.91 kg/m²      Physical Exam:    Physical Exam  Vitals reviewed.   Constitutional:       General: She is awake.      Appearance: Normal appearance.   HENT:      Head: Normocephalic.      Mouth/Throat:      Mouth: Mucous membranes are moist.   Eyes:      Extraocular Movements: Extraocular movements intact.   Cardiovascular:      Rate and Rhythm: Normal rate.      Heart sounds: Normal heart sounds.   Pulmonary:      Effort: Pulmonary effort is normal.      Breath sounds: Wheezing present.   Abdominal:      General: Bowel sounds are normal.      Palpations: Abdomen is soft.      Tenderness: There is no abdominal tenderness. There is no guarding or rebound.      Hernia: No hernia is present.   Musculoskeletal:         General: Normal range of motion.      Cervical back: Neck supple.   Skin:     General: Skin is warm and dry.   Neurological:      General: No focal deficit present.      Mental Status: She is alert.   Psychiatric:         Attention and Perception: Attention and perception normal.         Mood and Affect: Mood normal.         Behavior: Behavior normal.           Home Medications:  Prior to Admission medications    Medication Sig Start Date End Date Taking? Authorizing Provider   albuterol 90 mcg/actuation inhaler INHALE 2 PUFFS BY MOUTH FOUR TIMES A DAY AS NEEDED FOR SHORTNESS OF BREATH 8/30/23 8/29/24  Ole Campos MD   budesonide EC (Entocort EC) 3 mg 24 hr capsule Take 1 capsule (3 mg) by mouth 3 times a day. 11/13/23   Vikki Marquez MD   furosemide (Lasix) 20 mg tablet TAKE 1 TABLET BY  "MOUTH ONCE DAILY 10/23/23 1/21/24  Vikki Marquez MD   isosorbide mononitrate ER (Imdur) 30 mg 24 hr tablet Take 1 tablet (30 mg) by mouth once daily.    Historical Provider, MD   metoprolol tartrate (Lopressor) 50 mg tablet Take 1 tablet by mouth 2 times a day.    Historical Provider, MD   multivitamin tablet Take 1 tablet by mouth once daily.    Historical Provider, MD   pravastatin (Pravachol) 40 mg tablet Take 1 tablet (40 mg) by mouth once daily.    Historical Provider, MD   Symbicort 160-4.5 mcg/actuation inhaler INHALE 2 PUFFS BY MOUTH TWO TIMES A DAY 11/13/23 2/11/24  Vikki Marquez MD         Relevant Results Recent labs reviewed in the EMR.  Lab Results   Component Value Date    HGB 15.6 08/28/2023    HGB 16.1 (H) 08/26/2023    HGB 17.0 (H) 07/13/2023     (H) 08/28/2023    MCV 99 08/26/2023    MCV 98 07/13/2023     08/28/2023     08/26/2023     07/13/2023       No results found for: \"FERRITIN\", \"IRON\"    Lab Results   Component Value Date     11/17/2023    K 3.9 11/17/2023     11/17/2023    BUN 9 11/17/2023    CREATININE 0.64 11/17/2023       Lab Results   Component Value Date    BILITOT 0.8 11/17/2023     Lab Results   Component Value Date    ALT 12 11/17/2023    ALT 26 08/26/2023    ALT 12 07/13/2023    ALT 9 10/11/2021    AST 16 11/17/2023    AST 27 08/26/2023    AST 19 07/13/2023    AST 18 10/11/2021    ALKPHOS 58 11/17/2023    ALKPHOS 63 08/26/2023    ALKPHOS 61 07/13/2023    ALKPHOS 55 10/11/2021       No results found for: \"CRP\"    No results found for: \"CALPS\"    Radiology: Reviewed imaging reviewed in the EMR.  ECG 12 Lead    Result Date: 11/3/2023  See scanned image         "

## 2023-11-22 NOTE — PATIENT INSTRUCTIONS
Continue with budesonide I would recommend two tabs a day and then try to wean down to one a day     Trial of metamucil twice a day a total of 30g of fiber a day     Follow up in 6 months

## 2023-11-24 PROBLEM — K52.832 LYMPHOCYTIC COLITIS: Status: ACTIVE | Noted: 2023-11-24

## 2023-12-08 ENCOUNTER — OFFICE VISIT (OUTPATIENT)
Dept: PULMONOLOGY | Facility: HOSPITAL | Age: 78
End: 2023-12-08
Payer: MEDICARE

## 2023-12-08 VITALS
RESPIRATION RATE: 16 BRPM | TEMPERATURE: 96.8 F | DIASTOLIC BLOOD PRESSURE: 82 MMHG | OXYGEN SATURATION: 96 % | HEIGHT: 64 IN | WEIGHT: 120 LBS | SYSTOLIC BLOOD PRESSURE: 127 MMHG | BODY MASS INDEX: 20.49 KG/M2

## 2023-12-08 DIAGNOSIS — F06.4 ANXIETY DISORDER DUE TO KNOWN PHYSIOLOGICAL CONDITION: ICD-10-CM

## 2023-12-08 DIAGNOSIS — F17.210 TOBACCO DEPENDENCE DUE TO CIGARETTES: ICD-10-CM

## 2023-12-08 DIAGNOSIS — J44.9 CHRONIC OBSTRUCTIVE PULMONARY DISEASE, UNSPECIFIED COPD TYPE (MULTI): ICD-10-CM

## 2023-12-08 DIAGNOSIS — J96.11 CHRONIC RESPIRATORY FAILURE WITH HYPOXIA (MULTI): ICD-10-CM

## 2023-12-08 DIAGNOSIS — R91.1 LUNG NODULE, SOLITARY: Primary | ICD-10-CM

## 2023-12-08 DIAGNOSIS — C34.32 MALIGNANT NEOPLASM OF LOWER LOBE, LEFT BRONCHUS OR LUNG (MULTI): ICD-10-CM

## 2023-12-08 PROCEDURE — 99214 OFFICE O/P EST MOD 30 MIN: CPT | Performed by: INTERNAL MEDICINE

## 2023-12-08 PROCEDURE — 99214 OFFICE O/P EST MOD 30 MIN: CPT | Mod: PO | Performed by: INTERNAL MEDICINE

## 2023-12-08 PROCEDURE — 3074F SYST BP LT 130 MM HG: CPT | Performed by: INTERNAL MEDICINE

## 2023-12-08 PROCEDURE — 3079F DIAST BP 80-89 MM HG: CPT | Performed by: INTERNAL MEDICINE

## 2023-12-08 PROCEDURE — 1160F RVW MEDS BY RX/DR IN RCRD: CPT | Performed by: INTERNAL MEDICINE

## 2023-12-08 PROCEDURE — 1159F MED LIST DOCD IN RCRD: CPT | Performed by: INTERNAL MEDICINE

## 2023-12-08 RX ORDER — ALBUTEROL SULFATE 90 UG/1
2 AEROSOL, METERED RESPIRATORY (INHALATION) 4 TIMES DAILY PRN
Qty: 18 G | Refills: 1 | Status: SHIPPED | OUTPATIENT
Start: 2023-12-08 | End: 2024-12-07

## 2023-12-08 RX ORDER — BUDESONIDE, GLYCOPYRROLATE, AND FORMOTEROL FUMARATE 160; 9; 4.8 UG/1; UG/1; UG/1
2 AEROSOL, METERED RESPIRATORY (INHALATION)
Qty: 24 G | Refills: 11 | Status: SHIPPED | OUTPATIENT
Start: 2023-12-08 | End: 2024-05-09 | Stop reason: HOSPADM

## 2023-12-08 RX ORDER — HYDROXYZINE HYDROCHLORIDE 25 MG/1
TABLET, FILM COATED ORAL
Qty: 2 TABLET | Refills: 2 | Status: SHIPPED | OUTPATIENT
Start: 2023-12-08 | End: 2024-04-11 | Stop reason: ALTCHOICE

## 2023-12-08 RX ORDER — IPRATROPIUM BROMIDE AND ALBUTEROL SULFATE 2.5; .5 MG/3ML; MG/3ML
3 SOLUTION RESPIRATORY (INHALATION) 4 TIMES DAILY PRN
Qty: 360 ML | Refills: 11 | Status: SHIPPED | OUTPATIENT
Start: 2023-12-08 | End: 2023-12-27 | Stop reason: SDUPTHER

## 2023-12-08 ASSESSMENT — ENCOUNTER SYMPTOMS
SHORTNESS OF BREATH: 1
NERVOUS/ANXIOUS: 1
COUGH: 1

## 2023-12-08 NOTE — PROGRESS NOTES
Subjective   Patient ID: Abigail Aquino is a 78 y.o. female who presents for COPD.  HPI  Patient is a 79 y/o female with PMH of PE on Eliquis, COPD, and nicotine dependence. She was referred for an enlarging lung nodule on CT chest. She gets shortness of breath on exertion. She denies a cough on a daily basis. She reports some unintentional weight loss. She is supposed to wear 3L continuous oxygen. She states that she heard herself wheezing prior to starting on oxygen. The nodule was first found in 2021 when she had a PE but did not follow up on testing. She denies any fever, chills, chest pain, or hemoptysis. She quit smoking daily one month ago but also states that she will smoke 1-3 cig per day when stressful, she mostly smoked 1-2 ppd. She denies any family history of lung cancer.     She is here for follow up. She states that her TUBBS is progressing. Denies worsening of cough or chest pain. She appears frustrated because her daughter and son live out of state and she has no help, lives by herself for 25 years. She did not complete PET scan due to anxiety and claustrophobia too. She states she has not smoked at all since Sep 2023. She wants POC but states she cannot carry if more than 2 lbs. She would like to get contact information for [facility].   Review of Systems   Respiratory:  Positive for cough and shortness of breath.    Psychiatric/Behavioral:  The patient is nervous/anxious.    All other systems reviewed and are negative.      Objective   Physical Exam  Vitals and nursing note reviewed.   Constitutional:       Appearance: Normal appearance.   HENT:      Head: Normocephalic.      Nose: Nose normal.      Mouth/Throat:      Pharynx: Oropharynx is clear.   Eyes:      Extraocular Movements: Extraocular movements intact.      Conjunctiva/sclera: Conjunctivae normal.      Pupils: Pupils are equal, round, and reactive to light.   Cardiovascular:      Rate and Rhythm: Normal rate and regular rhythm.       Pulses: Normal pulses.      Heart sounds: Normal heart sounds.   Pulmonary:      Effort: Pulmonary effort is normal.      Breath sounds: Normal breath sounds.   Abdominal:      General: Bowel sounds are normal.      Palpations: Abdomen is soft.   Musculoskeletal:         General: Normal range of motion.      Cervical back: Normal range of motion.   Skin:     General: Skin is warm.   Neurological:      General: No focal deficit present.      Mental Status: She is alert and oriented to person, place, and time. Mental status is at baseline.   Psychiatric:         Mood and Affect: Mood normal.         Behavior: Behavior normal.       Assessment/Plan   1. Lung nodule  2. COPD  3. Nicotine dependence  4. Chronic respiratory failure with hypoxia at 3 L  5. Anxiety disorder     Plan:     Patient was referred for a spiculated lung nodule in the LLL measuring 2.7 cm X 1.1 cm on CT chest done 8/27/23 that has enlarged from 1.0 cm X 1.4 cm. in 2021. Discussed at length this is suspicious for malignancy and she is also high risk due to smoking history. She does report some unintentional weight loss of about 5-10 lbs. Discussed CT at length and recommend PET scan now and discussed potential need for biopsy based on results. Patient is agreeable to plan of care, PET scan ordered and scheduled.      -PET scan.  -PFTs discussed with FEV1 31%,   -DC Symbicort and start Breztri. Continue albuterol prn. Start nebulizer.   -6MWT ordered, she is on 3L continuous oxygen.  -She states that she has mostly smoked 1-2 ppd and started when she was 11. She states one month ago she cut back to 1-3 cigs per day when she has a stressful day. Counseled for 5 min to abstain from smoking. She states she has not however smoked since September.      I provided reassurance to patient. She does not have good social support. I provided help in terms of getting POC home O2 and getting her scan done. She initially wanted PET under anesthesia but cannot go  to [facility]. She would try to do it with sedation. I recommend hydroxyzine for anxiety. Avoid BZD due to severe copd. I will also optimize her copd with triple therapy and will consider theophylline. Add Duonebs prn also.     Educated and counseled. Recommend PET stat and CT guided bx with IR if not able to complete PET first.

## 2023-12-08 NOTE — PATIENT INSTRUCTIONS
Please stop symbicort and start taking Breztri 2 puffs twice daily.  Take albuterol inhaler as needed for shortness of breath and start Nebulizer duonebs as needed as discussed.   Please complete your PET scan and CT guided biopsy as we have discussed today at length including the prognosis.  I have sent order for nebulizer and portable concentrator for you.  I will see you back after the PET scan is completed.

## 2023-12-13 ENCOUNTER — APPOINTMENT (OUTPATIENT)
Dept: PRIMARY CARE | Facility: CLINIC | Age: 78
End: 2023-12-13
Payer: MEDICARE

## 2023-12-14 ENCOUNTER — APPOINTMENT (OUTPATIENT)
Dept: RADIOLOGY | Facility: HOSPITAL | Age: 78
End: 2023-12-14
Payer: MEDICARE

## 2023-12-14 ENCOUNTER — HOSPITAL ENCOUNTER (EMERGENCY)
Facility: HOSPITAL | Age: 78
Discharge: HOME | End: 2023-12-15
Attending: EMERGENCY MEDICINE
Payer: MEDICARE

## 2023-12-14 ENCOUNTER — APPOINTMENT (OUTPATIENT)
Dept: CARDIOLOGY | Facility: HOSPITAL | Age: 78
End: 2023-12-14
Payer: MEDICARE

## 2023-12-14 DIAGNOSIS — J44.1 COPD EXACERBATION (MULTI): ICD-10-CM

## 2023-12-14 DIAGNOSIS — R07.9 CHEST PAIN, UNSPECIFIED TYPE: ICD-10-CM

## 2023-12-14 DIAGNOSIS — Z86.711 HISTORY OF PULMONARY EMBOLISM: ICD-10-CM

## 2023-12-14 DIAGNOSIS — I26.99 OTHER ACUTE PULMONARY EMBOLISM, UNSPECIFIED WHETHER ACUTE COR PULMONALE PRESENT (MULTI): Primary | ICD-10-CM

## 2023-12-14 PROBLEM — C34.92 MALIGNANT NEOPLASM OF LEFT LUNG (MULTI): Status: ACTIVE | Noted: 2023-12-14

## 2023-12-14 LAB
ALBUMIN SERPL BCP-MCNC: 4 G/DL (ref 3.4–5)
ALP SERPL-CCNC: 59 U/L (ref 33–136)
ALT SERPL W P-5'-P-CCNC: 14 U/L (ref 7–45)
ANION GAP BLDV CALCULATED.4IONS-SCNC: 7 MMOL/L (ref 10–25)
ANION GAP SERPL CALC-SCNC: 10 MMOL/L (ref 10–20)
APTT PPP: 26 SECONDS (ref 27–38)
AST SERPL W P-5'-P-CCNC: 18 U/L (ref 9–39)
BASE EXCESS BLDV CALC-SCNC: 4.2 MMOL/L (ref -2–3)
BASOPHILS # BLD AUTO: 0.1 X10*3/UL (ref 0–0.1)
BASOPHILS NFR BLD AUTO: 0.8 %
BILIRUB SERPL-MCNC: 0.6 MG/DL (ref 0–1.2)
BNP SERPL-MCNC: 346 PG/ML (ref 0–99)
BODY TEMPERATURE: 37 DEGREES CELSIUS
BUN SERPL-MCNC: 11 MG/DL (ref 6–23)
CA-I BLDV-SCNC: 1.19 MMOL/L (ref 1.1–1.33)
CALCIUM SERPL-MCNC: 8.9 MG/DL (ref 8.6–10.3)
CARDIAC TROPONIN I PNL SERPL HS: 4 NG/L (ref 0–13)
CARDIAC TROPONIN I PNL SERPL HS: 5 NG/L (ref 0–13)
CARDIAC TROPONIN I PNL SERPL HS: 5 NG/L (ref 0–13)
CHLORIDE BLDV-SCNC: 101 MMOL/L (ref 98–107)
CHLORIDE SERPL-SCNC: 103 MMOL/L (ref 98–107)
CO2 SERPL-SCNC: 30 MMOL/L (ref 21–32)
CREAT SERPL-MCNC: 0.65 MG/DL (ref 0.5–1.05)
EOSINOPHIL # BLD AUTO: 0.19 X10*3/UL (ref 0–0.4)
EOSINOPHIL NFR BLD AUTO: 1.5 %
ERYTHROCYTE [DISTWIDTH] IN BLOOD BY AUTOMATED COUNT: 14 % (ref 11.5–14.5)
GFR SERPL CREATININE-BSD FRML MDRD: 90 ML/MIN/1.73M*2
GLUCOSE BLDV-MCNC: 101 MG/DL (ref 74–99)
GLUCOSE SERPL-MCNC: 92 MG/DL (ref 74–99)
HCO3 BLDV-SCNC: 31.2 MMOL/L (ref 22–26)
HCT VFR BLD AUTO: 47.8 % (ref 36–46)
HCT VFR BLD EST: 49 % (ref 36–46)
HGB BLD-MCNC: 15.9 G/DL (ref 12–16)
HGB BLDV-MCNC: 16.2 G/DL (ref 12–16)
IMM GRANULOCYTES # BLD AUTO: 0.07 X10*3/UL (ref 0–0.5)
IMM GRANULOCYTES NFR BLD AUTO: 0.5 % (ref 0–0.9)
INHALED O2 CONCENTRATION: 32 %
INR PPP: 0.9 (ref 0.9–1.1)
LACTATE BLDV-SCNC: 1.6 MMOL/L (ref 0.4–2)
LYMPHOCYTES # BLD AUTO: 3.55 X10*3/UL (ref 0.8–3)
LYMPHOCYTES NFR BLD AUTO: 27.3 %
MAGNESIUM SERPL-MCNC: 2 MG/DL (ref 1.6–2.4)
MCH RBC QN AUTO: 32.9 PG (ref 26–34)
MCHC RBC AUTO-ENTMCNC: 33.3 G/DL (ref 32–36)
MCV RBC AUTO: 99 FL (ref 80–100)
MONOCYTES # BLD AUTO: 1.2 X10*3/UL (ref 0.05–0.8)
MONOCYTES NFR BLD AUTO: 9.2 %
NEUTROPHILS # BLD AUTO: 7.91 X10*3/UL (ref 1.6–5.5)
NEUTROPHILS NFR BLD AUTO: 60.7 %
NRBC BLD-RTO: 0 /100 WBCS (ref 0–0)
OXYHGB MFR BLDV: 82.3 % (ref 45–75)
PCO2 BLDV: 54 MM HG (ref 41–51)
PH BLDV: 7.37 PH (ref 7.33–7.43)
PLATELET # BLD AUTO: 205 X10*3/UL (ref 150–450)
PO2 BLDV: 53 MM HG (ref 35–45)
POTASSIUM BLDV-SCNC: 4.4 MMOL/L (ref 3.5–5.3)
POTASSIUM SERPL-SCNC: 4.4 MMOL/L (ref 3.5–5.3)
PROT SERPL-MCNC: 6.4 G/DL (ref 6.4–8.2)
PROTHROMBIN TIME: 10.3 SECONDS (ref 9.8–12.8)
RBC # BLD AUTO: 4.84 X10*6/UL (ref 4–5.2)
SAO2 % BLDV: 86 % (ref 45–75)
SODIUM BLDV-SCNC: 135 MMOL/L (ref 136–145)
SODIUM SERPL-SCNC: 139 MMOL/L (ref 136–145)
UFH PPP CHRO-ACNC: 0.7 IU/ML
UFH PPP CHRO-ACNC: 0.8 IU/ML
UFH PPP CHRO-ACNC: 1.2 IU/ML
WBC # BLD AUTO: 13 X10*3/UL (ref 4.4–11.3)

## 2023-12-14 PROCEDURE — 2500000002 HC RX 250 W HCPCS SELF ADMINISTERED DRUGS (ALT 637 FOR MEDICARE OP, ALT 636 FOR OP/ED): Performed by: NURSE PRACTITIONER

## 2023-12-14 PROCEDURE — 85730 THROMBOPLASTIN TIME PARTIAL: CPT | Performed by: EMERGENCY MEDICINE

## 2023-12-14 PROCEDURE — 85610 PROTHROMBIN TIME: CPT | Performed by: EMERGENCY MEDICINE

## 2023-12-14 PROCEDURE — 93005 ELECTROCARDIOGRAM TRACING: CPT

## 2023-12-14 PROCEDURE — 71275 CT ANGIOGRAPHY CHEST: CPT

## 2023-12-14 PROCEDURE — 71045 X-RAY EXAM CHEST 1 VIEW: CPT | Performed by: RADIOLOGY

## 2023-12-14 PROCEDURE — 94640 AIRWAY INHALATION TREATMENT: CPT

## 2023-12-14 PROCEDURE — 96376 TX/PRO/DX INJ SAME DRUG ADON: CPT

## 2023-12-14 PROCEDURE — 83880 ASSAY OF NATRIURETIC PEPTIDE: CPT | Performed by: EMERGENCY MEDICINE

## 2023-12-14 PROCEDURE — 2500000001 HC RX 250 WO HCPCS SELF ADMINISTERED DRUGS (ALT 637 FOR MEDICARE OP): Performed by: EMERGENCY MEDICINE

## 2023-12-14 PROCEDURE — 99285 EMERGENCY DEPT VISIT HI MDM: CPT | Performed by: INTERNAL MEDICINE

## 2023-12-14 PROCEDURE — 2500000004 HC RX 250 GENERAL PHARMACY W/ HCPCS (ALT 636 FOR OP/ED): Performed by: EMERGENCY MEDICINE

## 2023-12-14 PROCEDURE — 85520 HEPARIN ASSAY: CPT | Performed by: INTERNAL MEDICINE

## 2023-12-14 PROCEDURE — 85025 COMPLETE CBC W/AUTO DIFF WBC: CPT | Performed by: EMERGENCY MEDICINE

## 2023-12-14 PROCEDURE — 96375 TX/PRO/DX INJ NEW DRUG ADDON: CPT

## 2023-12-14 PROCEDURE — 85520 HEPARIN ASSAY: CPT | Performed by: EMERGENCY MEDICINE

## 2023-12-14 PROCEDURE — 84132 ASSAY OF SERUM POTASSIUM: CPT | Performed by: EMERGENCY MEDICINE

## 2023-12-14 PROCEDURE — 36415 COLL VENOUS BLD VENIPUNCTURE: CPT | Performed by: EMERGENCY MEDICINE

## 2023-12-14 PROCEDURE — 99285 EMERGENCY DEPT VISIT HI MDM: CPT | Performed by: EMERGENCY MEDICINE

## 2023-12-14 PROCEDURE — 96366 THER/PROPH/DIAG IV INF ADDON: CPT

## 2023-12-14 PROCEDURE — 99215 OFFICE O/P EST HI 40 MIN: CPT | Performed by: NURSE PRACTITIONER

## 2023-12-14 PROCEDURE — 2500000001 HC RX 250 WO HCPCS SELF ADMINISTERED DRUGS (ALT 637 FOR MEDICARE OP): Performed by: INTERNAL MEDICINE

## 2023-12-14 PROCEDURE — 71045 X-RAY EXAM CHEST 1 VIEW: CPT

## 2023-12-14 PROCEDURE — 2500000004 HC RX 250 GENERAL PHARMACY W/ HCPCS (ALT 636 FOR OP/ED): Performed by: INTERNAL MEDICINE

## 2023-12-14 PROCEDURE — 96365 THER/PROPH/DIAG IV INF INIT: CPT

## 2023-12-14 PROCEDURE — 2500000002 HC RX 250 W HCPCS SELF ADMINISTERED DRUGS (ALT 637 FOR MEDICARE OP, ALT 636 FOR OP/ED): Performed by: INTERNAL MEDICINE

## 2023-12-14 PROCEDURE — 83735 ASSAY OF MAGNESIUM: CPT | Performed by: EMERGENCY MEDICINE

## 2023-12-14 PROCEDURE — 2550000001 HC RX 255 CONTRASTS: Performed by: EMERGENCY MEDICINE

## 2023-12-14 PROCEDURE — 99284 EMERGENCY DEPT VISIT MOD MDM: CPT | Performed by: INTERNAL MEDICINE

## 2023-12-14 PROCEDURE — 96374 THER/PROPH/DIAG INJ IV PUSH: CPT | Mod: 59

## 2023-12-14 PROCEDURE — 84484 ASSAY OF TROPONIN QUANT: CPT | Performed by: INTERNAL MEDICINE

## 2023-12-14 PROCEDURE — 71275 CT ANGIOGRAPHY CHEST: CPT | Performed by: RADIOLOGY

## 2023-12-14 PROCEDURE — 84484 ASSAY OF TROPONIN QUANT: CPT | Performed by: EMERGENCY MEDICINE

## 2023-12-14 RX ORDER — BUDESONIDE 3 MG/1
3 CAPSULE, COATED PELLETS ORAL 3 TIMES DAILY
Status: DISCONTINUED | OUTPATIENT
Start: 2023-12-14 | End: 2023-12-15 | Stop reason: HOSPADM

## 2023-12-14 RX ORDER — HEPARIN SODIUM 5000 [USP'U]/ML
80 INJECTION, SOLUTION INTRAVENOUS; SUBCUTANEOUS ONCE
Status: COMPLETED | OUTPATIENT
Start: 2023-12-14 | End: 2023-12-14

## 2023-12-14 RX ORDER — HEPARIN SODIUM 5000 [USP'U]/ML
2000-4000 INJECTION, SOLUTION INTRAVENOUS; SUBCUTANEOUS EVERY 4 HOURS PRN
Status: DISCONTINUED | OUTPATIENT
Start: 2023-12-14 | End: 2023-12-15

## 2023-12-14 RX ORDER — MORPHINE SULFATE 4 MG/ML
4 INJECTION, SOLUTION INTRAMUSCULAR; INTRAVENOUS ONCE AS NEEDED
Status: COMPLETED | OUTPATIENT
Start: 2023-12-14 | End: 2023-12-14

## 2023-12-14 RX ORDER — ONDANSETRON HYDROCHLORIDE 2 MG/ML
4 INJECTION, SOLUTION INTRAVENOUS EVERY 8 HOURS PRN
Status: DISCONTINUED | OUTPATIENT
Start: 2023-12-14 | End: 2023-12-15 | Stop reason: HOSPADM

## 2023-12-14 RX ORDER — ONDANSETRON 4 MG/1
4 TABLET, FILM COATED ORAL EVERY 8 HOURS PRN
Status: DISCONTINUED | OUTPATIENT
Start: 2023-12-14 | End: 2023-12-15 | Stop reason: HOSPADM

## 2023-12-14 RX ORDER — DOXYCYCLINE 100 MG/1
100 CAPSULE ORAL ONCE
Status: COMPLETED | OUTPATIENT
Start: 2023-12-14 | End: 2023-12-14

## 2023-12-14 RX ORDER — ACETAMINOPHEN 160 MG/5ML
650 SOLUTION ORAL EVERY 4 HOURS PRN
Status: DISCONTINUED | OUTPATIENT
Start: 2023-12-14 | End: 2023-12-15 | Stop reason: HOSPADM

## 2023-12-14 RX ORDER — ALBUTEROL SULFATE 90 UG/1
2 AEROSOL, METERED RESPIRATORY (INHALATION) 4 TIMES DAILY PRN
Status: DISCONTINUED | OUTPATIENT
Start: 2023-12-14 | End: 2023-12-15 | Stop reason: HOSPADM

## 2023-12-14 RX ORDER — MULTIVIT-MIN/IRON FUM/FOLIC AC 7.5 MG-4
1 TABLET ORAL DAILY
Status: DISCONTINUED | OUTPATIENT
Start: 2023-12-14 | End: 2023-12-15 | Stop reason: HOSPADM

## 2023-12-14 RX ORDER — POLYETHYLENE GLYCOL 3350 17 G/17G
17 POWDER, FOR SOLUTION ORAL DAILY
Status: DISCONTINUED | OUTPATIENT
Start: 2023-12-14 | End: 2023-12-15 | Stop reason: HOSPADM

## 2023-12-14 RX ORDER — IPRATROPIUM BROMIDE AND ALBUTEROL SULFATE 2.5; .5 MG/3ML; MG/3ML
3 SOLUTION RESPIRATORY (INHALATION) 4 TIMES DAILY PRN
Status: DISCONTINUED | OUTPATIENT
Start: 2023-12-14 | End: 2023-12-15 | Stop reason: HOSPADM

## 2023-12-14 RX ORDER — MORPHINE SULFATE 4 MG/ML
4 INJECTION, SOLUTION INTRAMUSCULAR; INTRAVENOUS ONCE
Status: COMPLETED | OUTPATIENT
Start: 2023-12-14 | End: 2023-12-14

## 2023-12-14 RX ORDER — TRAMADOL HYDROCHLORIDE 50 MG/1
50 TABLET ORAL EVERY 8 HOURS PRN
Status: DISCONTINUED | OUTPATIENT
Start: 2023-12-14 | End: 2023-12-15 | Stop reason: HOSPADM

## 2023-12-14 RX ORDER — BISMUTH SUBSALICYLATE 262 MG
1 TABLET,CHEWABLE ORAL
Status: DISCONTINUED | OUTPATIENT
Start: 2023-12-14 | End: 2023-12-14 | Stop reason: SDUPTHER

## 2023-12-14 RX ORDER — FLUTICASONE FUROATE AND VILANTEROL 200; 25 UG/1; UG/1
1 POWDER RESPIRATORY (INHALATION) DAILY
Status: DISCONTINUED | OUTPATIENT
Start: 2023-12-14 | End: 2023-12-15 | Stop reason: HOSPADM

## 2023-12-14 RX ORDER — PRAVASTATIN SODIUM 40 MG/1
40 TABLET ORAL DAILY
Status: DISCONTINUED | OUTPATIENT
Start: 2023-12-14 | End: 2023-12-15 | Stop reason: HOSPADM

## 2023-12-14 RX ORDER — OXYCODONE HYDROCHLORIDE 5 MG/1
5 TABLET ORAL EVERY 6 HOURS PRN
Status: DISCONTINUED | OUTPATIENT
Start: 2023-12-14 | End: 2023-12-15 | Stop reason: HOSPADM

## 2023-12-14 RX ORDER — ACETAMINOPHEN 650 MG/1
650 SUPPOSITORY RECTAL EVERY 4 HOURS PRN
Status: DISCONTINUED | OUTPATIENT
Start: 2023-12-14 | End: 2023-12-15 | Stop reason: HOSPADM

## 2023-12-14 RX ORDER — FUROSEMIDE 40 MG/1
20 TABLET ORAL DAILY
Status: DISCONTINUED | OUTPATIENT
Start: 2023-12-14 | End: 2023-12-15 | Stop reason: HOSPADM

## 2023-12-14 RX ORDER — ACETAMINOPHEN 325 MG/1
650 TABLET ORAL EVERY 4 HOURS PRN
Status: DISCONTINUED | OUTPATIENT
Start: 2023-12-14 | End: 2023-12-15 | Stop reason: HOSPADM

## 2023-12-14 RX ORDER — ISOSORBIDE MONONITRATE 30 MG/1
30 TABLET, EXTENDED RELEASE ORAL DAILY
Status: DISCONTINUED | OUTPATIENT
Start: 2023-12-14 | End: 2023-12-15 | Stop reason: HOSPADM

## 2023-12-14 RX ORDER — HEPARIN SODIUM 10000 [USP'U]/100ML
0-4500 INJECTION, SOLUTION INTRAVENOUS CONTINUOUS
Status: DISCONTINUED | OUTPATIENT
Start: 2023-12-14 | End: 2023-12-15

## 2023-12-14 RX ORDER — METOPROLOL TARTRATE 25 MG/1
50 TABLET, FILM COATED ORAL 2 TIMES DAILY
Status: DISCONTINUED | OUTPATIENT
Start: 2023-12-14 | End: 2023-12-15 | Stop reason: HOSPADM

## 2023-12-14 RX ADMIN — ONDANSETRON 4 MG: 2 INJECTION INTRAMUSCULAR; INTRAVENOUS at 13:25

## 2023-12-14 RX ADMIN — METOPROLOL TARTRATE 50 MG: 25 TABLET, FILM COATED ORAL at 21:34

## 2023-12-14 RX ADMIN — MORPHINE SULFATE 4 MG: 4 INJECTION, SOLUTION INTRAMUSCULAR; INTRAVENOUS at 05:57

## 2023-12-14 RX ADMIN — TRAMADOL HYDROCHLORIDE 50 MG: 50 TABLET, COATED ORAL at 21:36

## 2023-12-14 RX ADMIN — FUROSEMIDE 20 MG: 40 TABLET ORAL at 09:55

## 2023-12-14 RX ADMIN — ALBUTEROL SULFATE 2 PUFF: 90 AEROSOL, METERED RESPIRATORY (INHALATION) at 10:32

## 2023-12-14 RX ADMIN — MORPHINE SULFATE 4 MG: 4 INJECTION, SOLUTION INTRAMUSCULAR; INTRAVENOUS at 04:21

## 2023-12-14 RX ADMIN — DOXYCYCLINE 100 MG: 100 CAPSULE ORAL at 04:44

## 2023-12-14 RX ADMIN — METOPROLOL TARTRATE 50 MG: 25 TABLET, FILM COATED ORAL at 09:54

## 2023-12-14 RX ADMIN — ISOSORBIDE MONONITRATE 30 MG: 30 TABLET, EXTENDED RELEASE ORAL at 10:31

## 2023-12-14 RX ADMIN — IPRATROPIUM BROMIDE AND ALBUTEROL SULFATE 3 ML: 2.5; .5 SOLUTION RESPIRATORY (INHALATION) at 09:54

## 2023-12-14 RX ADMIN — MORPHINE SULFATE 4 MG: 4 INJECTION, SOLUTION INTRAMUSCULAR; INTRAVENOUS at 01:25

## 2023-12-14 RX ADMIN — BUDESONIDE 3 MG: 3 CAPSULE, COATED PELLETS ORAL at 10:29

## 2023-12-14 RX ADMIN — HEPARIN SODIUM 4350 UNITS: 5000 INJECTION INTRAVENOUS; SUBCUTANEOUS at 05:57

## 2023-12-14 RX ADMIN — MULTIPLE VITAMINS W/ MINERALS TAB 1 TABLET: TAB at 09:54

## 2023-12-14 RX ADMIN — BUDESONIDE 3 MG: 3 CAPSULE, COATED PELLETS ORAL at 21:00

## 2023-12-14 RX ADMIN — METHYLPREDNISOLONE SODIUM SUCCINATE 125 MG: 125 INJECTION, POWDER, FOR SOLUTION INTRAMUSCULAR; INTRAVENOUS at 04:45

## 2023-12-14 RX ADMIN — IOHEXOL 75 ML: 350 INJECTION, SOLUTION INTRAVENOUS at 02:41

## 2023-12-14 RX ADMIN — FLUTICASONE FUROATE AND VILANTEROL TRIFENATATE 1 PUFF: 200; 25 POWDER RESPIRATORY (INHALATION) at 20:05

## 2023-12-14 RX ADMIN — HEPARIN SODIUM 979.2 UNITS/HR: 10000 INJECTION, SOLUTION INTRAVENOUS at 05:59

## 2023-12-14 ASSESSMENT — ENCOUNTER SYMPTOMS
CHILLS: 0
CHEST TIGHTNESS: 1
FEVER: 0
DECREASED CONCENTRATION: 0
PALPITATIONS: 0
LIGHT-HEADEDNESS: 0
VOMITING: 0
CONFUSION: 0
ABDOMINAL PAIN: 0
NAUSEA: 0
SHORTNESS OF BREATH: 0

## 2023-12-14 ASSESSMENT — COLUMBIA-SUICIDE SEVERITY RATING SCALE - C-SSRS
2. HAVE YOU ACTUALLY HAD ANY THOUGHTS OF KILLING YOURSELF?: NO
1. IN THE PAST MONTH, HAVE YOU WISHED YOU WERE DEAD OR WISHED YOU COULD GO TO SLEEP AND NOT WAKE UP?: NO
6. HAVE YOU EVER DONE ANYTHING, STARTED TO DO ANYTHING, OR PREPARED TO DO ANYTHING TO END YOUR LIFE?: NO

## 2023-12-14 ASSESSMENT — PAIN SCALES - GENERAL
PAINLEVEL_OUTOF10: 3
PAINLEVEL_OUTOF10: 10 - WORST POSSIBLE PAIN
PAINLEVEL_OUTOF10: 2
PAINLEVEL_OUTOF10: 0 - NO PAIN
PAINLEVEL_OUTOF10: 10 - WORST POSSIBLE PAIN
PAINLEVEL_OUTOF10: 10 - WORST POSSIBLE PAIN

## 2023-12-14 ASSESSMENT — LIFESTYLE VARIABLES
EVER FELT BAD OR GUILTY ABOUT YOUR DRINKING: NO
HAVE PEOPLE ANNOYED YOU BY CRITICIZING YOUR DRINKING: NO
REASON UNABLE TO ASSESS: NO
HAVE YOU EVER FELT YOU SHOULD CUT DOWN ON YOUR DRINKING: NO
EVER HAD A DRINK FIRST THING IN THE MORNING TO STEADY YOUR NERVES TO GET RID OF A HANGOVER: NO

## 2023-12-14 ASSESSMENT — PAIN - FUNCTIONAL ASSESSMENT
PAIN_FUNCTIONAL_ASSESSMENT: 0-10

## 2023-12-14 ASSESSMENT — PAIN DESCRIPTION - DESCRIPTORS: DESCRIPTORS: ACHING

## 2023-12-14 ASSESSMENT — PAIN DESCRIPTION - LOCATION: LOCATION: CHEST

## 2023-12-14 NOTE — PROGRESS NOTES
Abigail Aquino is a 78 y.o. female on day 0 of admission presenting with Acute pulmonary embolism without acute cor pulmonale (CMS/HCC).      Subjective   Abigail Aquino is a 78 y.o. female presenting with the above.  The pain was left-sided, and associated with shortness of breath.  She does have a chronic cough with clear production.  She denies hemoptysis.  Her shortness of breath has been getting worse over the last month she states.  She does have a lung mass, and was scheduled to have a PET scan tomorrow.  She also has a prior history of pulmonary embolism.  Past medical history is consistent with COPD, history of cigarette smoking, coronary artery disease without angina, previous pulmonary embolism, spiculated lung mass, hypertension, hyperlipidemia, and degenerative arthritis.  When I seen her she appeared comfortable, but states she still has chest discomfort when breathing deeply.  She presents at this time for further evaluation.       12/14: Pt seen and examined in the ED. No acute events overnight. BP elevated in the early morning hours but have come down approximately 140's/ mid 80's. Stating mid 90's on 3L, which is patient at home oxygen requirement. Pt stated her symptoms are much improved. Some of the chest wall discomfort remains, but mostly when sitting in a upright position. Will change PRN pain management to tramadol.          Review of Systems   Constitutional:  Negative for chills and fever.   Respiratory:  Positive for chest tightness. Negative for shortness of breath.         Shortness of breath continues to improve      Cardiovascular:  Positive for chest pain. Negative for palpitations and leg swelling.   Gastrointestinal:  Negative for abdominal pain, nausea and vomiting.   Skin: Negative.    Neurological:  Negative for light-headedness.   Psychiatric/Behavioral:  Negative for confusion and decreased concentration.           Objective     Last Recorded Vitals  /83   Pulse  79   Temp 36.6 °C (97.9 °F)   Resp (!) 28   Wt 54.4 kg (120 lb)   SpO2 92%     Physical Exam  Constitutional:       General: She is not in acute distress.     Appearance: Normal appearance.   HENT:      Head: Normocephalic.      Mouth/Throat:      Mouth: Mucous membranes are moist.   Eyes:      Extraocular Movements: Extraocular movements intact.      Conjunctiva/sclera: Conjunctivae normal.      Pupils: Pupils are equal, round, and reactive to light.   Cardiovascular:      Rate and Rhythm: Normal rate and regular rhythm.      Comments: Pressure along the left chest wall   Pulmonary:      Effort: Pulmonary effort is normal. No respiratory distress.      Breath sounds: Rhonchi present.   Chest:      Chest wall: Tenderness present.   Abdominal:      Palpations: Abdomen is soft.      Tenderness: There is no abdominal tenderness.   Musculoskeletal:         General: No deformity.      Cervical back: Normal range of motion.      Right lower le+ Edema present.      Left lower le+ Edema present.   Skin:     General: Skin is warm.   Neurological:      General: No focal deficit present.      Mental Status: She is alert.   Psychiatric:         Mood and Affect: Mood normal.         Behavior: Behavior normal.         Thought Content: Thought content normal.         Judgment: Judgment normal.         Lab Results  Results from last 7 days   Lab Units 23  0047   WBC AUTO x10*3/uL 13.0*   HEMOGLOBIN g/dL 15.9   HEMATOCRIT % 47.8*   PLATELETS AUTO x10*3/uL 205       Results from last 7 days   Lab Units 23  0047   SODIUM mmol/L 139   POTASSIUM mmol/L 4.4   CHLORIDE mmol/L 103   CO2 mmol/L 30   BUN mg/dL 11   CREATININE mg/dL 0.65   CALCIUM mg/dL 8.9   PROTEIN TOTAL g/dL 6.4   BILIRUBIN TOTAL mg/dL 0.6   ALK PHOS U/L 59   ALT U/L 14   AST U/L 18   GLUCOSE mg/dL 92       Image Results  CT angio chest for pulmonary embolism    Result Date: 2023  Interpreted By:  Bryant Cantrell, STUDY: CT ANGIO CHEST FOR  PULMONARY EMBOLISM;  12/14/2023 3:00 am   INDICATION: Signs/Symptoms:left chest pain, hx PE.   COMPARISON: August 2020   ACCESSION NUMBER(S): [id number]   ORDERING CLINICIAN: JESUS CUMMINS   TECHNIQUE: Helical data acquisition of the chest was obtained after intravenous administration of intravenous contrast as per PE protocol. Images were reformatted in coronal and sagittal planes. Axial and coronal maximum intensity projection (MIP) images were created and reviewed.   FINDINGS: Motion degradation is seen. Subtle right upper lobe subsegmental embolus detected on image 139. This is somewhat central within the pulmonary artery. This is compatible with small volume pulmonary embolism. No features of right heart strain.   There is bronchial thickening and features of smoking related airway disease. Mucus seen in the small airways. Enlarging left lower lobe spiculated lesion now measuring up to 3.2 x 1.7 cm malignancy needs to be excluded. No pneumothorax. No pleural effusions. Demineralization of the bones. Imaging of the upper abdomen shows hepatic cysts not fully interrogated. Moderate vascular calcification with mural thrombus seen in the descending aorta.       1. Subtle right upper lobe subsegmental filling defect in the pulmonary artery compatible with small volume PE. No evidence of right heart strain. 2. Emphysema. Bronchial thickening with features of chronic bronchitis. Slowly enlarging left lower lobe spiculated nodule. Neoplasm can have this appearance. If not previously evaluated, PET-CT and soft tissue sampling is advised     MACRO: Critical Finding:  See findings. Notification was initiated on 12/14/2023 at 3:37 am by  Bryant Cantrell.  (**-OCF-**) Instructions:   Signed by: Bryant Cantrell 12/14/2023 3:38 AM Dictation workstation:   [id number]    XR chest 1 view    Result Date: 12/14/2023  Interpreted By:  Benjie Blue, STUDY: XR CHEST 1 VIEW;  12/14/2023 1:11 am   INDICATION: Signs/Symptoms:cp, sob.    COMPARISON: 8/26/2023   ACCESSION NUMBER(S): [id number]   ORDERING CLINICIAN: JESUS CUMMINS   FINDINGS: The cardiac silhouette is stable in size. Absent calcification of thoracic aorta. No airspace consolidation or pleural effusion. No pneumothorax.       No airspace consolidation or pleural effusion.   MACRO: None   Signed by: Benjie Blue 12/14/2023 1:50 AM Dictation workstation:   [id number]       Medications  Scheduled medications:  budesonide EC, 3 mg, oral, TID  furosemide, 20 mg, oral, Daily  isosorbide mononitrate ER, 30 mg, oral, Daily  metoprolol tartrate, 50 mg, oral, BID  multivitamin with minerals, 1 tablet, oral, Daily  polyethylene glycol, 17 g, oral, Daily  pravastatin, 40 mg, oral, Daily      Continuous medications:  heparin, 0-4,500 Units/hr, Last Rate: 780 Units/hr (12/14/23 1140)      PRN medications:  PRN medications: acetaminophen **OR** acetaminophen **OR** acetaminophen, albuterol, heparin, ipratropium-albuteroL, ondansetron **OR** ondansetron, oxyCODONE            Code Status  Full Code     Assessment/Plan          Principal Problem:    Acute pulmonary embolism without acute cor pulmonale (CMS/HCC)  Active Problems:    Atherosclerotic heart disease of native coronary artery without angina pectoris    Chronic obstructive pulmonary disease, unspecified (CMS/HCC)    Essential (primary) hypertension    Hyperlipidemia, unspecified    Cigarette smoker    Malignant neoplasm of left lung (CMS/HCC)    1.)  Pulmonary embolism  Heparinized in ED   Repeat heparin assay     2.)  Spiculated left lower lobe lung mass  Consult pulmonology   Patient has been off AC to obtain biopsy by outpatient pulmonologist. Consult IR to see if we can obtain biopsy during admission and prior to anticoagulation.       3.)  COPD  Continue home medications.    4.)  Coronary artery disease without angina  Metoprolol tartrate 50 mg p.o. twice daily.    5.)  Hypertension  Lasix 20 mg p.o. daily.    6.)   Hyperlipidemia  Pravachol 40 mg p.o. daily.    7.)  Previous smoker  Patient states she quit 3 months ago.           Marine Levi     Pt seen and examined  with my Medical student . I have modified the note to reflect my documentation of HPI and assessment and plan.    Bryant Soria MD

## 2023-12-14 NOTE — H&P
Assessment/Plan   1.)  Pulmonary embolism  Patient will be admitted to intermediate care.  She has been heparinized in the emergency room.  I will check a CBC with differential, and a basic metabolic profile in the morning.  I did independently review a CT angiogram of her chest done in the emergency room, and my interpretation is small right upper lobe pulmonary embolism, COPD, and a left lower lobe spiculated nodule 3.2 x 1.7 in the left lower lobe most likely carcinogenic.  I did independently review a rhythm strip done in the emergency room, and my interpretation is normal sinus rhythm with a rate of 78 bpm.  I will continue to follow her clinically, and make adjustments accordingly.  2.)  Spiculated left lower lobe lung mass  The patient was to have a PET scan tomorrow, but I am not sure she can have it here in the hospital.  Need to check later to see if this is possible.  She will also need tissue sample at some point.  3.)  COPD  Continue home medications.  4.)  Coronary artery disease without angina  Rechecking a troponin level at 12 noon.  Initial troponin levels were 4, and 5.  Metoprolol tartrate 50 mg p.o. twice daily.  5.)  Hypertension  Lasix 20 mg p.o. daily.  6.)  Hyperlipidemia  Pravachol 40 mg p.o. daily.  7.)  Previous smoker  Patient states she quit 3 months ago.    Chief Complaint   Patient presents with    Chest Pain     Patient presents to the ED via EMS with c/o CP.  She states the pain is in the left chest.       History Of Present Illness  Abigail Aquino is a 78 y.o. female presenting with the above.  The pain was left-sided, and associated with shortness of breath.  She does have a chronic cough with clear production.  She denies hemoptysis.  Her shortness of breath has been getting worse over the last month she states.  She does have a lung mass, and was scheduled to have a PET scan tomorrow.  She also has a prior history of pulmonary embolism.  Past medical history is consistent  with COPD, history of cigarette smoking, coronary artery disease without angina, previous pulmonary embolism, spiculated lung mass, hypertension, hyperlipidemia, and degenerative arthritis.  When I seen her she appeared comfortable, but states she still has chest discomfort when breathing deeply.  She presents at this time for further evaluation.      Past Medical History  She has a past medical history of Clotting disorder (CMS/[facility]), COPD (chronic obstructive pulmonary disease) (CMS/[facility]), Eyebrow laceration, right, sequela (10/04/2023), Fracture tibia/fibula, right, sequela (10/11/2021), Hypertension, and Kidney stones (05/09/2017).    Surgical History  She has a past surgical history that includes Tonsillectomy and Appendectomy.    Allergies  Atorvastatin     Social History  She reports that she quit smoking about 3 months ago. Her smoking use included cigarettes. She has been exposed to tobacco smoke. She has never used smokeless tobacco. She reports that she does not currently use alcohol. She reports that she does not use drugs.    Family History  Family History   Problem Relation Name Age of Onset    Cancer Daughter         Current Medication     No current facility-administered medications on file prior to encounter.     Current Outpatient Medications on File Prior to Encounter   Medication Sig Dispense Refill    albuterol 90 mcg/actuation inhaler INHALE 2 PUFFS BY MOUTH FOUR TIMES A DAY AS NEEDED FOR SHORTNESS OF BREATH 18 g 1    budesonide EC (Entocort EC) 3 mg 24 hr capsule Take 1 capsule (3 mg) by mouth 3 times a day. 270 capsule 0    budesonide-glycopyr-formoterol (Breztri Aerosphere) 160-9-4.8 mcg/actuation HFA aerosol inhaler Inhale 2 puffs 2 times a day. 24 g 11    furosemide (Lasix) 20 mg tablet TAKE 1 TABLET BY MOUTH ONCE DAILY 90 tablet 0    hydrOXYzine HCL (Atarax) 25 mg tablet Take 1 tablet a night before your PET scan and another tablet on the morning of your PET scan. 2 tablet 2     ipratropium-albuteroL (Duo-Neb) 0.5-2.5 mg/3 mL nebulizer solution Take 3 mL by nebulization 4 times a day as needed for wheezing or shortness of breath. 360 mL 11    isosorbide mononitrate ER (Imdur) 30 mg 24 hr tablet Take 1 tablet (30 mg) by mouth once daily.      metoprolol tartrate (Lopressor) 50 mg tablet Take 1 tablet by mouth 2 times a day.      multivitamin tablet Take 1 tablet by mouth once daily.      pravastatin (Pravachol) 40 mg tablet Take 1 tablet (40 mg) by mouth once daily.      [DISCONTINUED] albuterol 90 mcg/actuation inhaler INHALE 2 PUFFS BY MOUTH FOUR TIMES A DAY AS NEEDED FOR SHORTNESS OF BREATH 18 g 1    [DISCONTINUED] Symbicort 160-4.5 mcg/actuation inhaler INHALE 2 PUFFS BY MOUTH TWO TIMES A DAY 3 each 0      Review of Systems  Constitutional : No fever, chills, nausea, vomiting, and/or diarrhea.  Cardiac: Chest pain with deep inspiration.  No palpitations or heart racing.  Peripheral edema.  Pulmonary: Current shortness of breath.  Current pulmonary embolism.  GI: No abdominal pain, black stool, hematochezia, and/or change in bowel habits.  Genitourinary: No dysuria, hematuria, and/or pyuria.  No flank pain.  Endocrine: No polydipsia, polyuria, or polyphagia.  No history of diabetes or thyroid disease.  Musculoskeletal: No back pain or neck pain.  No history of rheumatoid arthritis.  Hematology/Oncology: Spiculated lung mass most likely carcinogenic.  No lymphoma, and or leukemia.  Neuro: No visual disturbance.  No paresis, paralysis, and or paresthesia.   Psych: No history of depression and/or anxiety.  No history of psychosis.  Skin: No rashes, or specific lesions.  No history of skin cancer.    Physical Exam  Last Recorded Vitals  BP (!) 179/94   Pulse 78   Temp 36.6 °C (97.9 °F)   Resp 18   Wt 54.4 kg (120 lb)   SpO2 96%   General: Alert and oriented.  No apparent distress.  Skin: Warm and dry.  No rashes.  Neck: Supple.  No adenopathy or bruit.  HEENT: No infectious processes.   Passages are clear.  Heart: Regular without murmurs, gallops, and or rubs.  Lungs: Poor inspiration.  Faint wheezing bilaterally.  No rales or rhonchi.  Abdomen: Soft and nontender.  Bowel sounds positive.  No guarding or rebound.  No palpable masses.  Extremities: Bilateral 1+ pitting edema.  No distal cyanosis.  Neurological: PERRLA.  EOMI.  Cranial nerves grossly intact.  No focal weakness.  Sensory grossly intact.  Babinski's are bilaterally downgoing.    Relevant Results  Results for orders placed or performed during the hospital encounter of 12/14/23 (from the past 24 hour(s))   CBC and Auto Differential   Result Value Ref Range    WBC 13.0 (H) 4.4 - 11.3 x10*3/uL    nRBC 0.0 0.0 - 0.0 /100 WBCs    RBC 4.84 4.00 - 5.20 x10*6/uL    Hemoglobin 15.9 12.0 - 16.0 g/dL    Hematocrit 47.8 (H) 36.0 - 46.0 %    MCV 99 80 - 100 fL    MCH 32.9 26.0 - 34.0 pg    MCHC 33.3 32.0 - 36.0 g/dL    RDW 14.0 11.5 - 14.5 %    Platelets 205 150 - 450 x10*3/uL    Neutrophils % 60.7 40.0 - 80.0 %    Immature Granulocytes %, Automated 0.5 0.0 - 0.9 %    Lymphocytes % 27.3 13.0 - 44.0 %    Monocytes % 9.2 2.0 - 10.0 %    Eosinophils % 1.5 0.0 - 6.0 %    Basophils % 0.8 0.0 - 2.0 %    Neutrophils Absolute 7.91 (H) 1.60 - 5.50 x10*3/uL    Immature Granulocytes Absolute, Automated 0.07 0.00 - 0.50 x10*3/uL    Lymphocytes Absolute 3.55 (H) 0.80 - 3.00 x10*3/uL    Monocytes Absolute 1.20 (H) 0.05 - 0.80 x10*3/uL    Eosinophils Absolute 0.19 0.00 - 0.40 x10*3/uL    Basophils Absolute 0.10 0.00 - 0.10 x10*3/uL   Magnesium   Result Value Ref Range    Magnesium 2.00 1.60 - 2.40 mg/dL   Comprehensive metabolic panel   Result Value Ref Range    Glucose 92 74 - 99 mg/dL    Sodium 139 136 - 145 mmol/L    Potassium 4.4 3.5 - 5.3 mmol/L    Chloride 103 98 - 107 mmol/L    Bicarbonate 30 21 - 32 mmol/L    Anion Gap 10 10 - 20 mmol/L    Urea Nitrogen 11 6 - 23 mg/dL    Creatinine 0.65 0.50 - 1.05 mg/dL    eGFR 90 >60 mL/min/1.73m*2    Calcium 8.9 8.6 -  10.3 mg/dL    Albumin 4.0 3.4 - 5.0 g/dL    Alkaline Phosphatase 59 33 - 136 U/L    Total Protein 6.4 6.4 - 8.2 g/dL    AST 18 9 - 39 U/L    Bilirubin, Total 0.6 0.0 - 1.2 mg/dL    ALT 14 7 - 45 U/L   Troponin I, High Sensitivity   Result Value Ref Range    Troponin I, High Sensitivity 5 0 - 13 ng/L   B-Type Natriuretic Peptide   Result Value Ref Range     (H) 0 - 99 pg/mL   Blood Gas Venous Full Panel   Result Value Ref Range    POCT pH, Venous 7.37 7.33 - 7.43 pH    POCT pCO2, Venous 54 (H) 41 - 51 mm Hg    POCT pO2, Venous 53 (H) 35 - 45 mm Hg    POCT SO2, Venous 86 (H) 45 - 75 %    POCT Oxy Hemoglobin, Venous 82.3 (H) 45.0 - 75.0 %    POCT Hematocrit Calculated, Venous 49.0 (H) 36.0 - 46.0 %    POCT Sodium, Venous 135 (L) 136 - 145 mmol/L    POCT Potassium, Venous 4.4 3.5 - 5.3 mmol/L    POCT Chloride, Venous 101 98 - 107 mmol/L    POCT Ionized Calicum, Venous 1.19 1.10 - 1.33 mmol/L    POCT Glucose, Venous 101 (H) 74 - 99 mg/dL    POCT Lactate, Venous 1.6 0.4 - 2.0 mmol/L    POCT Base Excess, Venous 4.2 (H) -2.0 - 3.0 mmol/L    POCT HCO3 Calculated, Venous 31.2 (H) 22.0 - 26.0 mmol/L    POCT Hemoglobin, Venous 16.2 (H) 12.0 - 16.0 g/dL    POCT Anion Gap, Venous 7.0 (L) 10.0 - 25.0 mmol/L    Patient Temperature 37.0 degrees Celsius    FiO2 32 %   Troponin I, High Sensitivity   Result Value Ref Range    Troponin I, High Sensitivity 4 0 - 13 ng/L   aPTT - baseline   Result Value Ref Range    aPTT 26 (L) 27 - 38 seconds   Protime-INR   Result Value Ref Range    Protime 10.3 9.8 - 12.8 seconds    INR 0.9 0.9 - 1.1      CT angio chest for pulmonary embolism    Result Date: 12/14/2023  Interpreted By:  Bryant Cantrell, STUDY: CT ANGIO CHEST FOR PULMONARY EMBOLISM;  12/14/2023 3:00 am   INDICATION: Signs/Symptoms:left chest pain, hx PE.   COMPARISON: August 2020   ACCESSION NUMBER(S): [id number]   ORDERING CLINICIAN: JESUS CUMMINS   TECHNIQUE: Helical data acquisition of the chest was obtained after intravenous  administration of intravenous contrast as per PE protocol. Images were reformatted in coronal and sagittal planes. Axial and coronal maximum intensity projection (MIP) images were created and reviewed.   FINDINGS: Motion degradation is seen. Subtle right upper lobe subsegmental embolus detected on image 139. This is somewhat central within the pulmonary artery. This is compatible with small volume pulmonary embolism. No features of right heart strain.   There is bronchial thickening and features of smoking related airway disease. Mucus seen in the small airways. Enlarging left lower lobe spiculated lesion now measuring up to 3.2 x 1.7 cm malignancy needs to be excluded. No pneumothorax. No pleural effusions. Demineralization of the bones. Imaging of the upper abdomen shows hepatic cysts not fully interrogated. Moderate vascular calcification with mural thrombus seen in the descending aorta.       1. Subtle right upper lobe subsegmental filling defect in the pulmonary artery compatible with small volume PE. No evidence of right heart strain. 2. Emphysema. Bronchial thickening with features of chronic bronchitis. Slowly enlarging left lower lobe spiculated nodule. Neoplasm can have this appearance. If not previously evaluated, PET-CT and soft tissue sampling is advised     MACRO: Critical Finding:  See findings. Notification was initiated on 12/14/2023 at 3:37 am by  Bryant Cantrell.  (**-OCF-**) Instructions:   Signed by: Bryant Cantrell 12/14/2023 3:38 AM Dictation workstation:   [id number]    XR chest 1 view    Result Date: 12/14/2023  Interpreted By:  Benjie Blue, STUDY: XR CHEST 1 VIEW;  12/14/2023 1:11 am   INDICATION: Signs/Symptoms:cp, sob.   COMPARISON: 8/26/2023   ACCESSION NUMBER(S): [id number]   ORDERING CLINICIAN: JESUS CUMMINS   FINDINGS: The cardiac silhouette is stable in size. Absent calcification of thoracic aorta. No airspace consolidation or pleural effusion. No pneumothorax.       No airspace  consolidation or pleural effusion.   MACRO: None   Signed by: Benjie Blue 12/14/2023 1:50 AM Dictation workstation:   [id number]       Ino Duran D.O.

## 2023-12-14 NOTE — ED PROVIDER NOTES
Abigail Aquino is a 78 y.o. patient presenting to the ED for left-sided chest pain and shortness of breath.  The pain was present when she woke this morning and feels like a muscle cramp and is severe.  It is persisted throughout the day.  She does have a chronic cough, and recently has had more mucus.    The patient states that she is very unhappy to be seeing me because in July I diagnosed her with cellulitis and antibiotics did not fix her leg swelling. I offered to get another attending to see her which she declined.     Additional History Obtained from: none  Limitations to History: patient answers historical questions by saying to look at her chart.  ------------------------------------------------------------------------------------------------------------------------------------------  Physical Exam:  Appearance: Alert, cooperative,   Skin: Warm, dry, appropriate color for ethnicity.  Eyes: Cornea clear. No scleral icterus or injection.   ENT: Mucous membranes moist.  Pulmonary: Mild increased work of breathing, but able to speak in full sentences. Mildly diminished breath sounds throughout.   Cardiac: Heart sounds regular without murmur. B/L radial pulses full and symmetric.   Abdomen: Soft, not tender.  No rebound or guarding.   Musculoskeletal: No gross deformities.   Neurological: Face symmetrical. Voice clear. Appropriately conversant.   Psychiatric: Appropriate mood and affect.    Medical Decision Making:  Chronic Medical Conditions Significantly Affecting Care:  has a past medical history of Clotting disorder (CMS/[facility]), COPD (chronic obstructive pulmonary disease) (CMS/[facility]), Eyebrow laceration, right, sequela (10/04/2023), Fracture tibia/fibula, right, sequela (10/11/2021), Hypertension, and Kidney stones (05/09/2017).    Social Determinants of Health Significantly Affecting Care: difficulty affording medical care    Differential Diagnosis Considered but not limited to: ACS, CHF exacerbation, COPD  exacerbation, pneumonia.      External Records Reviewed:   I reviewed recent and relevant outside records including:     Independent Interpretation of Studies: The following studies were ordered as part of the emergency department work up and independently interpreted by me. See ED Course for details.    ED Course as of 12/18/23 0816   Thu Dec 14, 2023   0051 EKG performed at 0048 and interpreted by me shows sinus rhythm at a rate of 75.  Intervals are normal.  The axis is normal.  There are no ST elevations or depressions.  There is some mild baseline artifact.  No T wave changes.  No STEMI. [SP]   0104 Venous full panel with normal pH and mildly elevated pCO2 at 54.  Lactate is normal.  CBC with leukocytosis to 13 and is otherwise normal. [SP]   0114 CXR by my read is without infiltrate or pneumothorax. [SP]   0131 CMP and magnesium are within normal limits. [SP]   0153 Chest x-ray is without findings as read by radiology. [SP]   0153 Troponin is normal at 5.  BNP is mildly elevated at 346. [SP]      ED Course User Index  [SP] Cassia Damon DO         Diagnoses as of 12/18/23 0816   Other acute pulmonary embolism, unspecified whether acute cor pulmonale present (CMS/HCC)   Chest pain, unspecified type   COPD exacerbation (CMS/HCC)         Escalation of Care:  Patient's initial workup is reassuring however she does have significant shortness of breath and history of PE not currently on anticoagulation.  CT PE study was therefore performed and does show a right-sided PE.  She was started on heparin.  I did recommend admission which the patient agrees to.      Discussion of Management with Other Providers:   I discussed the patient/results with: Dr. Moncada, who accepted the patient for admission       Cassia Damon DO  12/18/23 0818

## 2023-12-14 NOTE — PROGRESS NOTES
Abigail Aquino is a 78 y.o. female admitted for Acute pulmonary embolism without acute cor pulmonale (CMS/[facility]). Pharmacy reviewed the patient's [id number] medications and allergies for accuracy.    The list below reflects the PTA list prior to pharmacy medication history. A summary a changes to the PTA medication list has been listed below. Please review each medication in order reconciliation for additional clarification and justification.    Medications added:    Medications modified:    Medications to be removed:   MULTIVITAMIN    Medications of concern:      Prior to Admission Medications   Prescriptions Last Dose Informant Patient Reported? Taking?   albuterol 90 mcg/actuation inhaler   No    Sig: INHALE 2 PUFFS BY MOUTH FOUR TIMES A DAY AS NEEDED FOR SHORTNESS OF BREATH   budesonide EC (Entocort EC) 3 mg 24 hr capsule   No    Sig: Take 1 capsule (3 mg) by mouth 3 times a day.   budesonide-glycopyr-formoterol (Breztri Aerosphere) 160-9-4.8 mcg/actuation HFA aerosol inhaler   No    Sig: Inhale 2 puffs 2 times a day.   furosemide (Lasix) 20 mg tablet   No    Sig: TAKE 1 TABLET BY MOUTH ONCE DAILY   hydrOXYzine HCL (Atarax) 25 mg tablet   No    Sig: Take 1 tablet a night before your PET scan and another tablet on the morning of your PET scan.   ipratropium-albuteroL (Duo-Neb) 0.5-2.5 mg/3 mL nebulizer solution   No    Sig: Take 3 mL by nebulization 4 times a day as needed for wheezing or shortness of breath.   isosorbide mononitrate ER (Imdur) 30 mg 24 hr tablet   Yes    Sig: Take 1 tablet (30 mg) by mouth once daily.   metoprolol tartrate (Lopressor) 50 mg tablet   Yes    Sig: Take 1 tablet by mouth 2 times a day.   multivitamin tablet   Yes    Sig: Take 1 tablet by mouth once daily.   pravastatin (Pravachol) 40 mg tablet   Yes    Sig: Take 1 tablet (40 mg) by mouth once daily.      Facility-Administered Medications: None       Kala Dillard CPhT

## 2023-12-15 ENCOUNTER — APPOINTMENT (OUTPATIENT)
Dept: RADIOLOGY | Facility: HOSPITAL | Age: 78
End: 2023-12-15
Payer: MEDICARE

## 2023-12-15 ENCOUNTER — HOSPITAL ENCOUNTER (OUTPATIENT)
Dept: RADIOLOGY | Facility: HOSPITAL | Age: 78
End: 2023-12-15
Payer: MEDICARE

## 2023-12-15 ENCOUNTER — HOSPITAL ENCOUNTER (OUTPATIENT)
Dept: RADIOLOGY | Facility: HOSPITAL | Age: 78
Discharge: HOME | End: 2023-12-15
Payer: MEDICARE

## 2023-12-15 ENCOUNTER — APPOINTMENT (OUTPATIENT)
Dept: CARDIOLOGY | Facility: HOSPITAL | Age: 78
End: 2023-12-15
Payer: MEDICARE

## 2023-12-15 VITALS
RESPIRATION RATE: 16 BRPM | BODY MASS INDEX: 20.49 KG/M2 | TEMPERATURE: 97.9 F | DIASTOLIC BLOOD PRESSURE: 86 MMHG | OXYGEN SATURATION: 99 % | HEIGHT: 64 IN | SYSTOLIC BLOOD PRESSURE: 129 MMHG | HEART RATE: 68 BPM | WEIGHT: 120 LBS

## 2023-12-15 PROBLEM — E03.5 MYXEDEMA COMA (MULTI): Status: ACTIVE | Noted: 2023-12-15

## 2023-12-15 LAB
ANION GAP SERPL CALC-SCNC: 8 MMOL/L (ref 10–20)
BUN SERPL-MCNC: 15 MG/DL (ref 6–23)
CALCIUM SERPL-MCNC: 8.9 MG/DL (ref 8.6–10.3)
CHLORIDE SERPL-SCNC: 101 MMOL/L (ref 98–107)
CO2 SERPL-SCNC: 32 MMOL/L (ref 21–32)
CREAT SERPL-MCNC: 0.55 MG/DL (ref 0.5–1.05)
ERYTHROCYTE [DISTWIDTH] IN BLOOD BY AUTOMATED COUNT: 14.4 % (ref 11.5–14.5)
GFR SERPL CREATININE-BSD FRML MDRD: >90 ML/MIN/1.73M*2
GLUCOSE SERPL-MCNC: 151 MG/DL (ref 74–99)
HCT VFR BLD AUTO: 40.1 % (ref 36–46)
HGB BLD-MCNC: 13.5 G/DL (ref 12–16)
MCH RBC QN AUTO: 33.3 PG (ref 26–34)
MCHC RBC AUTO-ENTMCNC: 33.7 G/DL (ref 32–36)
MCV RBC AUTO: 99 FL (ref 80–100)
NRBC BLD-RTO: 0 /100 WBCS (ref 0–0)
PLATELET # BLD AUTO: 176 X10*3/UL (ref 150–450)
POTASSIUM SERPL-SCNC: 4.3 MMOL/L (ref 3.5–5.3)
RBC # BLD AUTO: 4.06 X10*6/UL (ref 4–5.2)
SODIUM SERPL-SCNC: 137 MMOL/L (ref 136–145)
UFH PPP CHRO-ACNC: 0.7 IU/ML
UFH PPP CHRO-ACNC: 0.7 IU/ML
WBC # BLD AUTO: 13.9 X10*3/UL (ref 4.4–11.3)

## 2023-12-15 PROCEDURE — 2500000001 HC RX 250 WO HCPCS SELF ADMINISTERED DRUGS (ALT 637 FOR MEDICARE OP): Performed by: INTERNAL MEDICINE

## 2023-12-15 PROCEDURE — 36415 COLL VENOUS BLD VENIPUNCTURE: CPT | Performed by: INTERNAL MEDICINE

## 2023-12-15 PROCEDURE — 93970 EXTREMITY STUDY: CPT | Performed by: RADIOLOGY

## 2023-12-15 PROCEDURE — 2500000002 HC RX 250 W HCPCS SELF ADMINISTERED DRUGS (ALT 637 FOR MEDICARE OP, ALT 636 FOR OP/ED): Performed by: NURSE PRACTITIONER

## 2023-12-15 PROCEDURE — 99239 HOSP IP/OBS DSCHRG MGMT >30: CPT | Performed by: INTERNAL MEDICINE

## 2023-12-15 PROCEDURE — 93970 EXTREMITY STUDY: CPT

## 2023-12-15 PROCEDURE — 82374 ASSAY BLOOD CARBON DIOXIDE: CPT | Performed by: INTERNAL MEDICINE

## 2023-12-15 PROCEDURE — 96366 THER/PROPH/DIAG IV INF ADDON: CPT

## 2023-12-15 PROCEDURE — 93005 ELECTROCARDIOGRAM TRACING: CPT

## 2023-12-15 PROCEDURE — 85520 HEPARIN ASSAY: CPT | Performed by: INTERNAL MEDICINE

## 2023-12-15 PROCEDURE — 2500000004 HC RX 250 GENERAL PHARMACY W/ HCPCS (ALT 636 FOR OP/ED): Performed by: INTERNAL MEDICINE

## 2023-12-15 PROCEDURE — 85027 COMPLETE CBC AUTOMATED: CPT | Performed by: INTERNAL MEDICINE

## 2023-12-15 RX ORDER — LORAZEPAM 0.5 MG/1
0.5 TABLET ORAL EVERY 6 HOURS PRN
Status: DISCONTINUED | OUTPATIENT
Start: 2023-12-15 | End: 2023-12-15 | Stop reason: HOSPADM

## 2023-12-15 RX ORDER — TRAMADOL HYDROCHLORIDE 50 MG/1
50 TABLET ORAL EVERY 8 HOURS PRN
Qty: 15 TABLET | Refills: 0 | Status: SHIPPED | OUTPATIENT
Start: 2023-12-15 | End: 2023-12-20

## 2023-12-15 RX ORDER — LORAZEPAM 2 MG/ML
0.5 INJECTION INTRAMUSCULAR EVERY 6 HOURS PRN
Status: DISCONTINUED | OUTPATIENT
Start: 2023-12-15 | End: 2023-12-15

## 2023-12-15 RX ADMIN — TIOTROPIUM BROMIDE INHALATION SPRAY 2 PUFF: 3.12 SPRAY, METERED RESPIRATORY (INHALATION) at 06:44

## 2023-12-15 RX ADMIN — MULTIPLE VITAMINS W/ MINERALS TAB 1 TABLET: TAB at 08:25

## 2023-12-15 RX ADMIN — METOPROLOL TARTRATE 50 MG: 25 TABLET, FILM COATED ORAL at 08:26

## 2023-12-15 RX ADMIN — PRAVASTATIN SODIUM 40 MG: 40 TABLET ORAL at 08:25

## 2023-12-15 RX ADMIN — APIXABAN 10 MG: 5 TABLET, FILM COATED ORAL at 17:19

## 2023-12-15 RX ADMIN — FUROSEMIDE 20 MG: 40 TABLET ORAL at 08:25

## 2023-12-15 RX ADMIN — LORAZEPAM 0.5 MG: 0.5 TABLET ORAL at 10:08

## 2023-12-15 RX ADMIN — FLUTICASONE FUROATE AND VILANTEROL TRIFENATATE 1 PUFF: 200; 25 POWDER RESPIRATORY (INHALATION) at 09:39

## 2023-12-15 RX ADMIN — ISOSORBIDE MONONITRATE 30 MG: 30 TABLET, EXTENDED RELEASE ORAL at 08:25

## 2023-12-15 RX ADMIN — POLYETHYLENE GLYCOL 3350 17 G: 17 POWDER, FOR SOLUTION ORAL at 09:37

## 2023-12-15 RX ADMIN — BUDESONIDE 3 MG: 3 CAPSULE, COATED PELLETS ORAL at 10:11

## 2023-12-15 RX ADMIN — TRAMADOL HYDROCHLORIDE 50 MG: 50 TABLET, COATED ORAL at 10:08

## 2023-12-15 SDOH — SOCIAL STABILITY: SOCIAL INSECURITY: WERE YOU ABLE TO COMPLETE ALL THE BEHAVIORAL HEALTH SCREENINGS?: YES

## 2023-12-15 SDOH — SOCIAL STABILITY: SOCIAL INSECURITY: HAVE YOU HAD THOUGHTS OF HARMING ANYONE ELSE?: NO

## 2023-12-15 ASSESSMENT — COGNITIVE AND FUNCTIONAL STATUS - GENERAL
PATIENT BASELINE BEDBOUND: NO
MOBILITY SCORE: 24
DAILY ACTIVITIY SCORE: 24

## 2023-12-15 ASSESSMENT — LIFESTYLE VARIABLES
HOW OFTEN DO YOU HAVE 6 OR MORE DRINKS ON ONE OCCASION: NEVER
SUBSTANCE_ABUSE_PAST_12_MONTHS: NO
HOW OFTEN DO YOU HAVE A DRINK CONTAINING ALCOHOL: NEVER
PRESCIPTION_ABUSE_PAST_12_MONTHS: NO

## 2023-12-15 ASSESSMENT — ACTIVITIES OF DAILY LIVING (ADL): LACK_OF_TRANSPORTATION: NO

## 2023-12-15 ASSESSMENT — PATIENT HEALTH QUESTIONNAIRE - PHQ9
2. FEELING DOWN, DEPRESSED OR HOPELESS: NOT AT ALL
1. LITTLE INTEREST OR PLEASURE IN DOING THINGS: NOT AT ALL
SUM OF ALL RESPONSES TO PHQ9 QUESTIONS 1 & 2: 0

## 2023-12-15 NOTE — DISCHARGE SUMMARY
Discharge Diagnosis  Acute pulmonary embolism without acute cor pulmonale (CMS/HCC)    Issues Requiring Follow-Up  Pulmonary embolism without acute cor pulmonale   Left lung mass spiculated following with outpatient pulmonology    COPD   CAD   HTN  HLD  Tobacco use     Discharge Meds     Your medication list        START taking these medications        Instructions Last Dose Given Next Dose Due   apixaban 5 mg (74 tabs) tablet  Commonly known as: Eliquis      Take 2 tablets (10 mg) by mouth 2 times a day for 7 days, then take 1 tablet (5 mg) by mouth 2 times a day.       traMADol 50 mg tablet  Commonly known as: Ultram      Take 1 tablet (50 mg) by mouth every 8 hours if needed for moderate pain (4 - 6) for up to 5 days.              CONTINUE taking these medications        Instructions Last Dose Given Next Dose Due   albuterol 90 mcg/actuation inhaler      INHALE 2 PUFFS BY MOUTH FOUR TIMES A DAY AS NEEDED FOR SHORTNESS OF BREATH       Breztri Aerosphere 160-9-4.8 mcg/actuation HFA aerosol inhaler  Generic drug: budesonide-glycopyr-formoterol      Inhale 2 puffs 2 times a day.       budesonide EC 3 mg 24 hr capsule  Commonly known as: Entocort EC      Take 1 capsule (3 mg) by mouth 3 times a day.       furosemide 20 mg tablet  Commonly known as: Lasix      TAKE 1 TABLET BY MOUTH ONCE DAILY       hydrOXYzine HCL 25 mg tablet  Commonly known as: Atarax      Take 1 tablet a night before your PET scan and another tablet on the morning of your PET scan.       ipratropium-albuteroL 0.5-2.5 mg/3 mL nebulizer solution  Commonly known as: Duo-Neb      Take 3 mL by nebulization 4 times a day as needed for wheezing or shortness of breath.       isosorbide mononitrate ER 30 mg 24 hr tablet  Commonly known as: Imdur           metoprolol tartrate 50 mg tablet  Commonly known as: Lopressor           pravastatin 40 mg tablet  Commonly known as: Pravachol                     Where to Get Your Medications        These medications  were sent to [facility]/pharmacy #[id number] - [place], [place] - [address] AT [address] OF [address] [address]      Phone: [phone]   apixaban 5 mg (74 tabs) tablet       You can get these medications from any pharmacy    Bring a paper prescription for each of these medications  traMADol 50 mg tablet         Test Results Pending At Discharge  Pending Labs       No current pending labs.          Hospital Course    Abigail Aquino is a 78 y.o. female presenting with the above.  The pain was left-sided, and associated with shortness of breath.  She does have a chronic cough with clear production.  She denies hemoptysis.  Her shortness of breath has been getting worse over the last month she states.  She does have a lung mass, and was scheduled to have a PET scan tomorrow.  She also has a prior history of pulmonary embolism.  Past medical history is consistent with COPD, history of cigarette smoking, coronary artery disease without angina, previous pulmonary embolism, spiculated lung mass, hypertension, hyperlipidemia, and degenerative arthritis.  When I seen her she appeared comfortable, but states she still has chest discomfort when breathing deeply.  She presents at this time for further evaluation.        12/14: Pt seen and examined in the ED. No acute events overnight. BP elevated in the early morning hours but have come down approximately 140's/ mid 80's. Stating mid 90's on 3L, which is patient at home oxygen requirement. Pt stated her symptoms are much improved. Some of the chest wall discomfort remains, but mostly when sitting in a upright position. Will change PRN pain management to tramadol.     12/15: Pt seen and examined. No acute events overnight. Breathing improved, pt requiring no supplemental oxygen.  Patients chest pain continues to improve. Bilateral US LE no evidence of DVT. Spoke with IR, agreed to do outpatient biopsy next week with patient one day off AC, appointment will  be made prior to discharge. Pulmonology on board with this plan. Discharge this afternoon anticipated.     Discharge plan: See discharge medications above. Follow up with IR next week for biopsy on 2023. Follow up with pulmonology in two weeks.     Discharge planning took more then 35 minutes.     Small peripheral pulmonary embolism.  Will continue Eliquis on discharge.  Will be on full dose loading for the first 7 days.  May be associated with her lung tumor.  Will plan treatment for 3 months and reassess as outpatient.  Spiculated left lower lung mass.  Follow-up as scheduled for biopsy next week.  Discussed with pulmonary service who are in agreement with this plan.  Can be off of her anticoagulation for 1 day to get the biopsy done.  Follow-up with pulmonary after biopsy is complete.  Biopsy is to be scheduled prior to discharge.  COPD continue home medications.  CAD.  Continue home medications.  Hyperlipidemia hypertension.  Continue home medications.       Pertinent Physical Exam At Time of Discharge  Physical Exam  Vitals  Pulse: 56 Resp: 20 BP: 106/97   Constitutional:       General: She is not in acute distress.     Appearance: Normal appearance.   HENT:      Head: Normocephalic.      Mouth/Throat:      Mouth: Mucous membranes are moist.   Eyes:      Extraocular Movements: Extraocular movements intact.      Conjunctiva/sclera: Conjunctivae normal.      Pupils: Pupils are equal, round, and reactive to light.   Cardiovascular:      Rate and Rhythm: Normal rate and regular rhythm.      Comments: Pressure along the left chest wall   Pulmonary:      Effort: Pulmonary effort is normal. No respiratory distress.   Chest:      Chest wall: Improving chest wall tenderness.   Abdominal:      Palpations: Abdomen is soft.      Tenderness: There is no abdominal tenderness.   Musculoskeletal:         General: No deformity.      Cervical back: Normal range of motion.      Right lower le+ Edema present.       Left lower le+ Edema present.   Skin:     General: Skin is warm.   Neurological:      General: No focal deficit present.      Mental Status: She is alert.   Psychiatric:         Mood and Affect: Mood normal.         Behavior: Behavior normal.         Thought Content: Thought content normal.         Judgment: Judgment normal.     Imaging studies:   CT Angio chest for PE  Impression:     1. Subtle right upper lobe subsegmental filling defect in the  pulmonary artery compatible with small volume PE. No evidence of  right heart strain.  2. Emphysema. Bronchial thickening with features of chronic  bronchitis. Slowly enlarging left lower lobe spiculated nodule.  Neoplasm can have this appearance. If not previously evaluated,  PET-CT and soft tissue sampling is advised     CXR   Impression:     No airspace consolidation or pleural effusion.     Vascular US Lower Extremity Venous Duplex Bilateral   IMPRESSION:  1. No evidence of deep venous thrombus in the evaluated veins of the  bilateral lower extremities from the inguinal ligament to the  popliteal fossa.    Consults:  IR   Pulmonology     Outpatient Follow-Up  Future Appointments   Date Time Provider Department Center   2024  1:00 PM Vikki Marquez MD [id number] [facility]   2024  1:30 PM Lydia Madden, APRN-CNP [id number] [facility]     Marine Levi    Pt seen and examined  with my Medical student . I have modified the note to reflect my documentation of HPI and assessment and plan.    Bryant Soria MD]        Marine Levi

## 2023-12-15 NOTE — DISCHARGE INSTRUCTIONS
You are starting anticoagulation therapy. Watch for signs of bleeding. Small cuts or wounds will take 2-3x longer to clot. So you will have to apply pressure for longer to stop bleeding. You should notify your doctor if you are coughing blood or see it in your urine. You should go the ER if you are throwing up blood or gritty material or if you see a lot of blood in your stool. You should go to the ER if you are struck in the head or torso. Typically patients tolerate anticoagulation without problems.

## 2023-12-15 NOTE — CONSULTS
Inpatient consult to Pulmonology  Consult performed by: Elizabeth Black, APRN-CNP  Consult ordered by: Bryant Soria MD        Reason For Consult  Lung mass, PE    History Of Present Illness  Abigail Aquino is a 78 y.o. female (former smoker, 1-2 PPD for many years) with a PMHx of CAD c/b MI s/p PCI in 2009, PE (2021), COPD on 3 L O2, HTN, HLD and ulcerative colitis. Admitted 12/14/23 after presented with L sided chest pain and SOB. In ED, workup significant for WBC 13, HS troponin 5, , VBG pH 7.37 pCO2 54, CXR without acute findings, and CT PE with small R PE without R heart strain, bronchial thickening, mucus in small airways and enlarging LLL spiculated lesion (3.2 x 1.7; 8/27/23 was 2.7 x 1.1). Pulmonary consulted for PE and lung mass.     Patient is seen by pulmonary outpatient, last seen by Dr. Hernandez 12/8/23, was started on Breztri and had PET CT and CT guided lung biopsy ordered for L lung mass.     Per chart review, patient had a PE in 2021 after breaking her leg and had been on Eliquis until 10/2023 when she was taken off by PCP d/t concerns of falls and the risk imposed on Epiquis.    Patient seen and examined in ED, on NC and in no apparent distress. Patient angry and crying at beginning of visit because she is upset that she cannot get her PET CT because she is in the hospital. Patient reports that she was recently changed to Breztri and her breathing was improved compared to being on Symbicort. Yesterday she noticed she was having increased SOB not improved by her inhalers and chest pain. She tried taking tylenol for the pain without improvement which prompted her to present to ED. She also notes increased cough with clear sputum production. Denies fever, chills, nausea or vomiting. She still has L sided chest pain that is worse with movement, coughing and deep inspiration; was relieved with morphine.      Past Medical History  Past Medical History:   Diagnosis Date    Clotting disorder  (CMS/[facility])     COPD (chronic obstructive pulmonary disease) (CMS/[facility])     Eyebrow laceration, right, sequela 10/04/2023    Fracture tibia/fibula, right, sequela 10/11/2021    Hypertension     Kidney stones 2017       Surgical History  Past Surgical History:   Procedure Laterality Date    APPENDECTOMY      TONSILLECTOMY          Social History  Social History     Tobacco Use    Smoking status: Former     Types: Cigarettes     Quit date: 2023     Years since quittin.2     Passive exposure: Past    Smokeless tobacco: Never   Vaping Use    Vaping Use: Never used   Substance Use Topics    Alcohol use: Not Currently    Drug use: Never       Family History  Family History   Problem Relation Name Age of Onset    Cancer Daughter          Allergies  Atorvastatin    Review of Systems  10-point ROS complete and negative except as documented in HPI     Physical Exam  Vitals reviewed.   Constitutional:       General: She is not in acute distress.     Appearance: Normal appearance. She is not ill-appearing.   HENT:      Head: Normocephalic.      Nose: Nose normal.      Mouth/Throat:      Mouth: Mucous membranes are moist.      Pharynx: Oropharynx is clear.   Eyes:      General: No scleral icterus.  Cardiovascular:      Rate and Rhythm: Normal rate and regular rhythm.      Pulses: Normal pulses.      Heart sounds: Normal heart sounds. No murmur heard.  Pulmonary:      Effort: No respiratory distress.      Breath sounds: No wheezing, rhonchi or rales.      Comments: Diminished throughout   Abdominal:      General: Bowel sounds are normal. There is no distension.      Palpations: Abdomen is soft.   Musculoskeletal:         General: Normal range of motion.      Cervical back: Normal range of motion and neck supple.      Right lower leg: Edema (1+ slightly pitting) present.      Left lower leg: Edema (trace-1+ slightly pitting) present.   Skin:     General: Skin is warm and dry.   Neurological:      Mental Status: She is  "alert and oriented to person, place, and time.   Psychiatric:         Attention and Perception: Attention normal.         Mood and Affect: Affect is angry and tearful.         Speech: Speech normal.         Behavior: Behavior normal. Behavior is cooperative.       Vital Signs  Blood pressure 139/71, pulse 73, temperature 36.6 °C (97.9 °F), resp. rate 22, height 1.626 m (5' 4\"), weight 54.4 kg (120 lb), SpO2 94 %.  Oxygen Therapy  SpO2: 94 %  Medical Gas Therapy: Supplemental oxygen  O2 Delivery Method: Nasal cannula (3L)  FiO2 (%): 32 %    Medications:  Scheduled medications  budesonide EC, 3 mg, oral, TID  fluticasone furoate-vilanteroL, 1 puff, inhalation, Daily  furosemide, 20 mg, oral, Daily  isosorbide mononitrate ER, 30 mg, oral, Daily  metoprolol tartrate, 50 mg, oral, BID  multivitamin with minerals, 1 tablet, oral, Daily  polyethylene glycol, 17 g, oral, Daily  pravastatin, 40 mg, oral, Daily  [START ON 12/15/2023] tiotropium, 2 Inhalation, inhalation, Daily    Continuous medications  heparin, 0-4,500 Units/hr, Last Rate: 680 Units/hr (12/14/23 2031)    PRN medications  PRN medications: acetaminophen **OR** acetaminophen **OR** acetaminophen, albuterol, heparin, ipratropium-albuteroL, ondansetron **OR** ondansetron, [Held by provider] oxyCODONE, traMADol    Relevant Results  Labs:  Results for orders placed or performed during the hospital encounter of 12/14/23 (from the past 24 hour(s))   CBC and Auto Differential   Result Value Ref Range    WBC 13.0 (H) 4.4 - 11.3 x10*3/uL    nRBC 0.0 0.0 - 0.0 /100 WBCs    RBC 4.84 4.00 - 5.20 x10*6/uL    Hemoglobin 15.9 12.0 - 16.0 g/dL    Hematocrit 47.8 (H) 36.0 - 46.0 %    MCV 99 80 - 100 fL    MCH 32.9 26.0 - 34.0 pg    MCHC 33.3 32.0 - 36.0 g/dL    RDW 14.0 11.5 - 14.5 %    Platelets 205 150 - 450 x10*3/uL    Neutrophils % 60.7 40.0 - 80.0 %    Immature Granulocytes %, Automated 0.5 0.0 - 0.9 %    Lymphocytes % 27.3 13.0 - 44.0 %    Monocytes % 9.2 2.0 - 10.0 %    " Eosinophils % 1.5 0.0 - 6.0 %    Basophils % 0.8 0.0 - 2.0 %    Neutrophils Absolute 7.91 (H) 1.60 - 5.50 x10*3/uL    Immature Granulocytes Absolute, Automated 0.07 0.00 - 0.50 x10*3/uL    Lymphocytes Absolute 3.55 (H) 0.80 - 3.00 x10*3/uL    Monocytes Absolute 1.20 (H) 0.05 - 0.80 x10*3/uL    Eosinophils Absolute 0.19 0.00 - 0.40 x10*3/uL    Basophils Absolute 0.10 0.00 - 0.10 x10*3/uL   Magnesium   Result Value Ref Range    Magnesium 2.00 1.60 - 2.40 mg/dL   Comprehensive metabolic panel   Result Value Ref Range    Glucose 92 74 - 99 mg/dL    Sodium 139 136 - 145 mmol/L    Potassium 4.4 3.5 - 5.3 mmol/L    Chloride 103 98 - 107 mmol/L    Bicarbonate 30 21 - 32 mmol/L    Anion Gap 10 10 - 20 mmol/L    Urea Nitrogen 11 6 - 23 mg/dL    Creatinine 0.65 0.50 - 1.05 mg/dL    eGFR 90 >60 mL/min/1.73m*2    Calcium 8.9 8.6 - 10.3 mg/dL    Albumin 4.0 3.4 - 5.0 g/dL    Alkaline Phosphatase 59 33 - 136 U/L    Total Protein 6.4 6.4 - 8.2 g/dL    AST 18 9 - 39 U/L    Bilirubin, Total 0.6 0.0 - 1.2 mg/dL    ALT 14 7 - 45 U/L   Troponin I, High Sensitivity   Result Value Ref Range    Troponin I, High Sensitivity 5 0 - 13 ng/L   B-Type Natriuretic Peptide   Result Value Ref Range     (H) 0 - 99 pg/mL   Blood Gas Venous Full Panel   Result Value Ref Range    POCT pH, Venous 7.37 7.33 - 7.43 pH    POCT pCO2, Venous 54 (H) 41 - 51 mm Hg    POCT pO2, Venous 53 (H) 35 - 45 mm Hg    POCT SO2, Venous 86 (H) 45 - 75 %    POCT Oxy Hemoglobin, Venous 82.3 (H) 45.0 - 75.0 %    POCT Hematocrit Calculated, Venous 49.0 (H) 36.0 - 46.0 %    POCT Sodium, Venous 135 (L) 136 - 145 mmol/L    POCT Potassium, Venous 4.4 3.5 - 5.3 mmol/L    POCT Chloride, Venous 101 98 - 107 mmol/L    POCT Ionized Calicum, Venous 1.19 1.10 - 1.33 mmol/L    POCT Glucose, Venous 101 (H) 74 - 99 mg/dL    POCT Lactate, Venous 1.6 0.4 - 2.0 mmol/L    POCT Base Excess, Venous 4.2 (H) -2.0 - 3.0 mmol/L    POCT HCO3 Calculated, Venous 31.2 (H) 22.0 - 26.0 mmol/L     POCT Hemoglobin, Venous 16.2 (H) 12.0 - 16.0 g/dL    POCT Anion Gap, Venous 7.0 (L) 10.0 - 25.0 mmol/L    Patient Temperature 37.0 degrees Celsius    FiO2 32 %   Troponin I, High Sensitivity   Result Value Ref Range    Troponin I, High Sensitivity 4 0 - 13 ng/L   aPTT - baseline   Result Value Ref Range    aPTT 26 (L) 27 - 38 seconds   Protime-INR   Result Value Ref Range    Protime 10.3 9.8 - 12.8 seconds    INR 0.9 0.9 - 1.1   Heparin Assay, UFH   Result Value Ref Range    Heparin Unfractionated 1.2 (HH) See Comment Below for Therapeutic Ranges IU/mL   Troponin I, High Sensitivity   Result Value Ref Range    Troponin I, High Sensitivity 5 0 - 13 ng/L   Heparin Assay   Result Value Ref Range    Heparin Unfractionated 0.8 See Comment Below for Therapeutic Ranges IU/mL   Heparin Assay, UFH   Result Value Ref Range    Heparin Unfractionated 0.7 See Comment Below for Therapeutic Ranges IU/mL     Imaging:  CT angio chest for pulmonary embolism  Result Date: 12/14/2023  Interpreted By:  Bryant Cantrell, STUDY: CT ANGIO CHEST FOR PULMONARY EMBOLISM;  12/14/2023 3:00 am   INDICATION: Signs/Symptoms:left chest pain, hx PE.   COMPARISON: August 2020   ACCESSION NUMBER(S): [id number]   ORDERING CLINICIAN: JESUS CUMMINS   TECHNIQUE: Helical data acquisition of the chest was obtained after intravenous administration of intravenous contrast as per PE protocol. Images were reformatted in coronal and sagittal planes. Axial and coronal maximum intensity projection (MIP) images were created and reviewed.   FINDINGS: Motion degradation is seen. Subtle right upper lobe subsegmental embolus detected on image 139. This is somewhat central within the pulmonary artery. This is compatible with small volume pulmonary embolism. No features of right heart strain.   There is bronchial thickening and features of smoking related airway disease. Mucus seen in the small airways. Enlarging left lower lobe spiculated lesion now measuring up to 3.2 x  1.7 cm malignancy needs to be excluded. No pneumothorax. No pleural effusions. Demineralization of the bones. Imaging of the upper abdomen shows hepatic cysts not fully interrogated. Moderate vascular calcification with mural thrombus seen in the descending aorta.       1. Subtle right upper lobe subsegmental filling defect in the pulmonary artery compatible with small volume PE. No evidence of right heart strain. 2. Emphysema. Bronchial thickening with features of chronic bronchitis. Slowly enlarging left lower lobe spiculated nodule. Neoplasm can have this appearance. If not previously evaluated, PET-CT and soft tissue sampling is advised     MACRO: Critical Finding:  See findings. Notification was initiated on 12/14/2023 at 3:37 am by  Bryant Cantrell.  (**-OCF-**) Instructions:   Signed by: Bryant Cantrell 12/14/2023 3:38 AM Dictation workstation:   [id number]    XR chest 1 view  Result Date: 12/14/2023  Interpreted By:  Benjie Blue, STUDY: XR CHEST 1 VIEW;  12/14/2023 1:11 am   INDICATION: Signs/Symptoms:cp, sob.   COMPARISON: 8/26/2023   ACCESSION NUMBER(S): [id number]   ORDERING CLINICIAN: JESUS CUMMINS   FINDINGS: The cardiac silhouette is stable in size. Absent calcification of thoracic aorta. No airspace consolidation or pleural effusion. No pneumothorax.       No airspace consolidation or pleural effusion.   MACRO: None   Signed by: Benjie Blue 12/14/2023 1:50 AM Dictation workstation:   [id number]     CT angio chest 8/27/23  IMPRESSION: No acute pulmonary embolism. 2.7 x 1.1 cm spiculated left lower lobe pulmonary nodule, enlarged from 11/15/2021, highly suspicious for malignancy. Mild bronchial wall thickening and mucous impaction versus aspiration  in the right lower lobe.    CT Mu PE 11/15/21  IMPRESSION: 1.  Extensive bilateral pulmonary embolism as described above. 2. Slight enlargement of the right ventricle as well as mild reflux of contrast into IVC which can be due to component of right  heart strain. 3. 1.4 x 1.0 cm nodular opacity in the left lower lobe with suggestion of spiculated border. This is suboptimally assessed due to motion artifact but cannot exclude possibility of primary lung malignancy and recommend short-term follow-up chest CT in 3 months to document resolution. If this lesion persists, further assessment with PET-CT should be considered. 4. New right upper lobe opacity which might be due to infectious process or given the presence of emboli, can be secondary to infarction. 5. Other chronic findings as described above.    Echo 8/28/23 CONCLUSIONS: 1. Left ventricular systolic function is normal with a 60-65% estimated ejection fraction.     Assessment/Plan   Abigail Aquino is a 78 y.o. female (former smoker, 1-2 PPD for many years) with a PMHx of CAD c/b MI s/p PCI in 2009, PE (2021), COPD on 3 L O2, HTN, HLD and ulcerative colitis. Admitted 12/14/23 after presented with L sided chest pain and SOB. In ED, workup significant for WBC 13, HS troponin 5, , VBG pH 7.37 pCO2 54, CXR without acute findings, and CT PE with small R PE without R heart strain, bronchial thickening, mucus in small airways and enlarging LLL spiculated lesion (3.2 x 1.7 cm ; 8/27/23 was 2.7 x 1.1). Pulmonary consulted for PE and lung mass.     #PE - small R PE on CT, no evidence of heart strain; had PE in 2021 after broke leg, was on Eliquis until 10/2023 when stopped by PCP  #COPD - on Breztri, PRN duonebs and PRN albuterol  #Chronic hypoxic respiratory failure - on 3 L baseline   #LLL spiculated lung mass (3.2 x 1.7 cm, 8/27/23 was 2.7 x 1.1 cm and 11/15/21 was 1 x 1.4 cm), likely malignancy; was scheduled for PET CT on 12/15/23 and CT guided biopsy but will not be able to be completed while inpatient     Recommendations:  -Start Breo Ellipta & Spiriva while inpatient (ordered), resume Breztri, PRN duonebs and PRN albuterol inhaler at discharge  -Continue duonebs & albuterol inhaler PRN  -Continue  supplemental oxygen, maintain pulse ox >90%  -Continue heparin drip currently, will need transitioned to DOAC prior to discharge (did well on Eliquis in the past for PE, was just taken off 10/2023)  -Consult IR to attempt to have CT guided lung biopsy done while inpatient and still on heparin drip as can be stopped temporarily and patient will require long-term anticoagulation and will not be able to stop for awhile; send for cytology   -Consider US duplex BLE to assess for clots (had edema in BLE, RLE slightly larger) and echo to assess for heart strain  -Pain control, oxycodone causes severe nausea, would try to avoid    -Will need to have PET CT rescheduled once discharged  -Will need pulmonary follow-up at discharge       **There is no pulmonary coverage at [facility] 12/15/23-12/17/23; if patient were to decline, please consult critical care. Pulmonary will sign off at this time. Thank you for the consult.       I spent 85 minutes in the professional and overall care of this patient.      Elizabeth Black, APRN-CNP

## 2023-12-21 ENCOUNTER — HOSPITAL ENCOUNTER (OUTPATIENT)
Dept: RADIOLOGY | Facility: HOSPITAL | Age: 78
Discharge: HOME | End: 2023-12-21
Payer: MEDICARE

## 2023-12-21 VITALS
OXYGEN SATURATION: 95 % | RESPIRATION RATE: 18 BRPM | DIASTOLIC BLOOD PRESSURE: 82 MMHG | SYSTOLIC BLOOD PRESSURE: 132 MMHG | HEART RATE: 59 BPM | TEMPERATURE: 97.5 F

## 2023-12-21 DIAGNOSIS — R91.1 LUNG NODULE, SOLITARY: ICD-10-CM

## 2023-12-21 PROCEDURE — 81445 SO NEO GSAP 5-50DNA/DNA&RNA: CPT | Performed by: INTERNAL MEDICINE

## 2023-12-21 PROCEDURE — 88342 IMHCHEM/IMCYTCHM 1ST ANTB: CPT | Performed by: STUDENT IN AN ORGANIZED HEALTH CARE EDUCATION/TRAINING PROGRAM

## 2023-12-21 PROCEDURE — 2500000005 HC RX 250 GENERAL PHARMACY W/O HCPCS: Performed by: RADIOLOGY

## 2023-12-21 PROCEDURE — 32408 CORE NDL BX LNG/MED PERQ: CPT

## 2023-12-21 PROCEDURE — 88363 XM ARCHIVE TISSUE MOLEC ANAL: CPT | Performed by: STUDENT IN AN ORGANIZED HEALTH CARE EDUCATION/TRAINING PROGRAM

## 2023-12-21 PROCEDURE — 32408 CORE NDL BX LNG/MED PERQ: CPT | Performed by: RADIOLOGY

## 2023-12-21 PROCEDURE — 99152 MOD SED SAME PHYS/QHP 5/>YRS: CPT

## 2023-12-21 PROCEDURE — 2720000007 HC OR 272 NO HCPCS

## 2023-12-21 PROCEDURE — 88360 TUMOR IMMUNOHISTOCHEM/MANUAL: CPT | Performed by: STUDENT IN AN ORGANIZED HEALTH CARE EDUCATION/TRAINING PROGRAM

## 2023-12-21 PROCEDURE — G0452 MOLECULAR PATHOLOGY INTERPR: HCPCS | Performed by: INTERNAL MEDICINE

## 2023-12-21 PROCEDURE — 99152 MOD SED SAME PHYS/QHP 5/>YRS: CPT | Performed by: RADIOLOGY

## 2023-12-21 PROCEDURE — 88342 IMHCHEM/IMCYTCHM 1ST ANTB: CPT | Mod: TC,59,SUR,PORLAB | Performed by: INTERNAL MEDICINE

## 2023-12-21 PROCEDURE — 88305 TISSUE EXAM BY PATHOLOGIST: CPT | Performed by: STUDENT IN AN ORGANIZED HEALTH CARE EDUCATION/TRAINING PROGRAM

## 2023-12-21 PROCEDURE — 2500000004 HC RX 250 GENERAL PHARMACY W/ HCPCS (ALT 636 FOR OP/ED): Performed by: RADIOLOGY

## 2023-12-21 RX ORDER — FENTANYL CITRATE 50 UG/ML
INJECTION, SOLUTION INTRAMUSCULAR; INTRAVENOUS
Status: COMPLETED | OUTPATIENT
Start: 2023-12-21 | End: 2023-12-21

## 2023-12-21 RX ORDER — LIDOCAINE HYDROCHLORIDE 20 MG/ML
INJECTION, SOLUTION EPIDURAL; INFILTRATION; INTRACAUDAL; PERINEURAL
Status: COMPLETED | OUTPATIENT
Start: 2023-12-21 | End: 2023-12-21

## 2023-12-21 RX ORDER — MIDAZOLAM HYDROCHLORIDE 1 MG/ML
INJECTION, SOLUTION INTRAMUSCULAR; INTRAVENOUS
Status: COMPLETED | OUTPATIENT
Start: 2023-12-21 | End: 2023-12-21

## 2023-12-21 RX ADMIN — FENTANYL CITRATE 50 MCG: 50 INJECTION, SOLUTION INTRAMUSCULAR; INTRAVENOUS at 08:41

## 2023-12-21 RX ADMIN — FENTANYL CITRATE 50 MCG: 50 INJECTION, SOLUTION INTRAMUSCULAR; INTRAVENOUS at 08:46

## 2023-12-21 RX ADMIN — LIDOCAINE HYDROCHLORIDE 5 ML: 20 INJECTION, SOLUTION EPIDURAL; INFILTRATION; INTRACAUDAL; PERINEURAL at 08:48

## 2023-12-21 RX ADMIN — MIDAZOLAM 1 MG: 1 INJECTION INTRAMUSCULAR; INTRAVENOUS at 08:41

## 2023-12-21 ASSESSMENT — PAIN SCALES - GENERAL
PAINLEVEL_OUTOF10: 0 - NO PAIN
PAINLEVEL_OUTOF10: 3
PAINLEVEL_OUTOF10: 0 - NO PAIN

## 2023-12-21 ASSESSMENT — PAIN - FUNCTIONAL ASSESSMENT
PAIN_FUNCTIONAL_ASSESSMENT: 0-10

## 2023-12-21 NOTE — POST-PROCEDURE NOTE
Interventional Radiology Brief Postprocedure Note    Attending: Daquan Tinoco MD      Assistant: none    Diagnosis: lung mass    Description of procedure: lung biopsy     Anesthesia:  Local, mod sed    Complications: None    Estimated Blood Loss: none    2 20ga cores obtained  See detailed result report with images in PACS.    The patient tolerated the procedure well without incident or complication and is in stable condition.

## 2023-12-21 NOTE — PRE-PROCEDURE NOTE
Interventional Radiology Preprocedure Note    Indication for procedure: The encounter diagnosis was Lung nodule, solitary.    Relevant review of systems: NA    Relevant Labs:   Lab Results   Component Value Date    CREATININE 0.55 12/15/2023    EGFR >90 12/15/2023    INR 0.9 12/14/2023    PROTIME 10.3 12/14/2023       Planned Sedation/Anesthesia: Moderate    Airway assessment: normal    Directed physical examination:    RRR  CTA    Mallampati: II (hard and soft palate, upper portion of tonsils anduvula visible)    ASA Score: ASA 2 - Patient with mild systemic disease with no functional limitations    Benefits, risks and alternatives of procedure and planned sedation have been discussed with the patient and/or their representative. All questions answered and they agree to proceed.

## 2023-12-27 DIAGNOSIS — J44.9 CHRONIC OBSTRUCTIVE PULMONARY DISEASE, UNSPECIFIED COPD TYPE (MULTI): ICD-10-CM

## 2023-12-27 LAB
LAB AP ASR DISCLAIMER: NORMAL
LABORATORY COMMENT REPORT: NORMAL
PATH REPORT.COMMENTS IMP SPEC: NORMAL
PATH REPORT.FINAL DX SPEC: NORMAL
PATH REPORT.GROSS SPEC: NORMAL
PATH REPORT.RELEVANT HX SPEC: NORMAL
PATH REPORT.TOTAL CANCER: NORMAL

## 2023-12-27 RX ORDER — IPRATROPIUM BROMIDE AND ALBUTEROL SULFATE 2.5; .5 MG/3ML; MG/3ML
3 SOLUTION RESPIRATORY (INHALATION) 4 TIMES DAILY PRN
Qty: 360 ML | Refills: 11 | Status: SHIPPED | OUTPATIENT
Start: 2023-12-27 | End: 2024-12-26

## 2023-12-28 ENCOUNTER — APPOINTMENT (OUTPATIENT)
Dept: RADIOLOGY | Facility: HOSPITAL | Age: 78
End: 2023-12-28
Payer: MEDICARE

## 2023-12-28 ENCOUNTER — APPOINTMENT (OUTPATIENT)
Dept: CARDIOLOGY | Facility: HOSPITAL | Age: 78
End: 2023-12-28
Payer: MEDICARE

## 2023-12-28 ENCOUNTER — HOSPITAL ENCOUNTER (EMERGENCY)
Facility: HOSPITAL | Age: 78
Discharge: HOME | End: 2023-12-28
Attending: EMERGENCY MEDICINE
Payer: MEDICARE

## 2023-12-28 VITALS
OXYGEN SATURATION: 91 % | WEIGHT: 120 LBS | RESPIRATION RATE: 20 BRPM | TEMPERATURE: 98.5 F | DIASTOLIC BLOOD PRESSURE: 69 MMHG | SYSTOLIC BLOOD PRESSURE: 113 MMHG | HEART RATE: 75 BPM | BODY MASS INDEX: 20.49 KG/M2 | HEIGHT: 64 IN

## 2023-12-28 DIAGNOSIS — R04.2 HEMOPTYSIS: Primary | ICD-10-CM

## 2023-12-28 LAB
ALBUMIN SERPL BCP-MCNC: 3.6 G/DL (ref 3.4–5)
ALP SERPL-CCNC: 67 U/L (ref 33–136)
ALT SERPL W P-5'-P-CCNC: 14 U/L (ref 7–45)
ANION GAP SERPL CALC-SCNC: 10 MMOL/L (ref 10–20)
AST SERPL W P-5'-P-CCNC: 18 U/L (ref 9–39)
BASOPHILS # BLD AUTO: 0.07 X10*3/UL (ref 0–0.1)
BASOPHILS NFR BLD AUTO: 0.8 %
BILIRUB SERPL-MCNC: 0.6 MG/DL (ref 0–1.2)
BNP SERPL-MCNC: 198 PG/ML (ref 0–99)
BUN SERPL-MCNC: 8 MG/DL (ref 6–23)
CALCIUM SERPL-MCNC: 8.2 MG/DL (ref 8.6–10.3)
CARDIAC TROPONIN I PNL SERPL HS: 5 NG/L (ref 0–13)
CHLORIDE SERPL-SCNC: 106 MMOL/L (ref 98–107)
CO2 SERPL-SCNC: 28 MMOL/L (ref 21–32)
CREAT SERPL-MCNC: 0.64 MG/DL (ref 0.5–1.05)
EOSINOPHIL # BLD AUTO: 0.15 X10*3/UL (ref 0–0.4)
EOSINOPHIL NFR BLD AUTO: 1.6 %
ERYTHROCYTE [DISTWIDTH] IN BLOOD BY AUTOMATED COUNT: 14.5 % (ref 11.5–14.5)
GFR SERPL CREATININE-BSD FRML MDRD: 90 ML/MIN/1.73M*2
GLUCOSE SERPL-MCNC: 83 MG/DL (ref 74–99)
HCT VFR BLD AUTO: 43.3 % (ref 36–46)
HGB BLD-MCNC: 14.8 G/DL (ref 12–16)
IMM GRANULOCYTES # BLD AUTO: 0.05 X10*3/UL (ref 0–0.5)
IMM GRANULOCYTES NFR BLD AUTO: 0.5 % (ref 0–0.9)
INR PPP: 1.4 (ref 0.9–1.1)
LYMPHOCYTES # BLD AUTO: 1.26 X10*3/UL (ref 0.8–3)
LYMPHOCYTES NFR BLD AUTO: 13.6 %
MCH RBC QN AUTO: 33.6 PG (ref 26–34)
MCHC RBC AUTO-ENTMCNC: 34.2 G/DL (ref 32–36)
MCV RBC AUTO: 98 FL (ref 80–100)
MONOCYTES # BLD AUTO: 0.63 X10*3/UL (ref 0.05–0.8)
MONOCYTES NFR BLD AUTO: 6.8 %
NEUTROPHILS # BLD AUTO: 7.13 X10*3/UL (ref 1.6–5.5)
NEUTROPHILS NFR BLD AUTO: 76.7 %
NRBC BLD-RTO: 0 /100 WBCS (ref 0–0)
PLATELET # BLD AUTO: 208 X10*3/UL (ref 150–450)
POTASSIUM SERPL-SCNC: 4.2 MMOL/L (ref 3.5–5.3)
PROT SERPL-MCNC: 6 G/DL (ref 6.4–8.2)
PROTHROMBIN TIME: 16 SECONDS (ref 9.8–12.8)
RBC # BLD AUTO: 4.4 X10*6/UL (ref 4–5.2)
SODIUM SERPL-SCNC: 140 MMOL/L (ref 136–145)
WBC # BLD AUTO: 9.3 X10*3/UL (ref 4.4–11.3)

## 2023-12-28 PROCEDURE — 2550000001 HC RX 255 CONTRASTS: Performed by: EMERGENCY MEDICINE

## 2023-12-28 PROCEDURE — 93005 ELECTROCARDIOGRAM TRACING: CPT

## 2023-12-28 PROCEDURE — 36415 COLL VENOUS BLD VENIPUNCTURE: CPT | Performed by: NURSE PRACTITIONER

## 2023-12-28 PROCEDURE — 99284 EMERGENCY DEPT VISIT MOD MDM: CPT | Performed by: EMERGENCY MEDICINE

## 2023-12-28 PROCEDURE — 99285 EMERGENCY DEPT VISIT HI MDM: CPT | Mod: 25

## 2023-12-28 PROCEDURE — 71275 CT ANGIOGRAPHY CHEST: CPT

## 2023-12-28 PROCEDURE — 80053 COMPREHEN METABOLIC PANEL: CPT | Performed by: NURSE PRACTITIONER

## 2023-12-28 PROCEDURE — 85610 PROTHROMBIN TIME: CPT | Performed by: NURSE PRACTITIONER

## 2023-12-28 PROCEDURE — 85025 COMPLETE CBC W/AUTO DIFF WBC: CPT | Performed by: NURSE PRACTITIONER

## 2023-12-28 PROCEDURE — 83880 ASSAY OF NATRIURETIC PEPTIDE: CPT | Performed by: NURSE PRACTITIONER

## 2023-12-28 PROCEDURE — 71045 X-RAY EXAM CHEST 1 VIEW: CPT

## 2023-12-28 PROCEDURE — 71275 CT ANGIOGRAPHY CHEST: CPT | Mod: FOREIGN READ | Performed by: RADIOLOGY

## 2023-12-28 PROCEDURE — 71045 X-RAY EXAM CHEST 1 VIEW: CPT | Mod: FOREIGN READ | Performed by: RADIOLOGY

## 2023-12-28 PROCEDURE — 84484 ASSAY OF TROPONIN QUANT: CPT | Performed by: NURSE PRACTITIONER

## 2023-12-28 RX ADMIN — IOHEXOL 75 ML: 350 INJECTION, SOLUTION INTRAVENOUS at 12:44

## 2023-12-28 ASSESSMENT — PAIN - FUNCTIONAL ASSESSMENT: PAIN_FUNCTIONAL_ASSESSMENT: 0-10

## 2023-12-28 ASSESSMENT — COLUMBIA-SUICIDE SEVERITY RATING SCALE - C-SSRS
6. HAVE YOU EVER DONE ANYTHING, STARTED TO DO ANYTHING, OR PREPARED TO DO ANYTHING TO END YOUR LIFE?: NO
2. HAVE YOU ACTUALLY HAD ANY THOUGHTS OF KILLING YOURSELF?: NO
1. IN THE PAST MONTH, HAVE YOU WISHED YOU WERE DEAD OR WISHED YOU COULD GO TO SLEEP AND NOT WAKE UP?: NO

## 2023-12-28 ASSESSMENT — PAIN SCALES - GENERAL: PAINLEVEL_OUTOF10: 4

## 2023-12-28 NOTE — ED PROVIDER NOTES
HPI   Chief Complaint   Patient presents with    Coughing Up Blood     In hospital  and  for a PE, off of eliquis now       78-year-old female with a history of pulmonary embolism on Eliquis, left lung mass with recent biopsy on , COPD, CAD, hypertension, hyperlipidemia presents to the emergency department today for hemoptysis.  Patient states that she was on Eliquis and  after breaking her leg and then stopped 2023 due to falls and risk imposed on Eliquis.  Patient states that she does wear oxygen intermittently most mostly when she is up ambulating as needed.  It is not humidified.  States for the past 3 days she has had a pink tinge nasal discharge when she blows her nose.  This morning she did have a bloody nose which has resolved.  States she then coughed up some blood shortly after.  States it was a quarter size amount.  Has not had any further bleeding.  Denies any dizziness headache or syncope.  No chest pain, shortness of breath abdominal or back pain.  States she been taking all of her medications as prescribed.                          [place] Coma Scale Score: 15                  Patient History   Past Medical History:   Diagnosis Date    Clotting disorder (CMS/[facility])     COPD (chronic obstructive pulmonary disease) (CMS/[facility])     Eyebrow laceration, right, sequela 10/04/2023    Fracture tibia/fibula, right, sequela 10/11/2021    Hypertension     Kidney stones 2017     Past Surgical History:   Procedure Laterality Date    APPENDECTOMY      CT GUIDED PERCUTANEOUS BIOPSY LUNG  2023    CT GUIDED PERCUTANEOUS BIOPSY LUNG 2023 POR CT    TONSILLECTOMY       Family History   Problem Relation Name Age of Onset    Cancer Daughter       Social History     Tobacco Use    Smoking status: Former     Types: Cigarettes     Quit date: 2023     Years since quittin.3     Passive exposure: Past    Smokeless tobacco: Never   Vaping Use    Vaping Use: Never used    Substance Use Topics    Alcohol use: Not Currently    Drug use: Never       Physical Exam   ED Triage Vitals [12/28/23 0929]   Temp Heart Rate Resp BP   36.9 °C (98.5 °F) 63 18 128/73      SpO2 Temp Source Heart Rate Source Patient Position   90 % Temporal Monitor --      BP Location FiO2 (%)     -- --       Physical Exam  Vitals and nursing note reviewed.   Constitutional:       General: She is not in acute distress.     Appearance: Normal appearance. She is not toxic-appearing.   HENT:      Right Ear: Tympanic membrane normal.      Left Ear: Tympanic membrane normal.      Nose:      Comments: Dried blood left nare no active bleeding     Mouth/Throat:      Mouth: Mucous membranes are moist.      Pharynx: Oropharynx is clear.   Eyes:      Extraocular Movements: Extraocular movements intact.      Pupils: Pupils are equal, round, and reactive to light.   Cardiovascular:      Rate and Rhythm: Normal rate and regular rhythm.      Pulses: Normal pulses.      Heart sounds: Normal heart sounds.   Pulmonary:      Effort: Pulmonary effort is normal.      Breath sounds: Examination of the right-lower field reveals decreased breath sounds. Examination of the left-lower field reveals decreased breath sounds. Decreased breath sounds present.   Abdominal:      General: Abdomen is flat. Bowel sounds are normal.      Palpations: Abdomen is soft.      Tenderness: There is no abdominal tenderness.   Musculoskeletal:         General: Normal range of motion.      Cervical back: Normal range of motion and neck supple.   Skin:     General: Skin is warm and dry.      Capillary Refill: Capillary refill takes less than 2 seconds.   Neurological:      General: No focal deficit present.      Mental Status: She is alert and oriented to person, place, and time.   Psychiatric:         Mood and Affect: Mood normal.         Behavior: Behavior normal.         Judgment: Judgment normal.         Labs Reviewed   CBC WITH AUTO DIFFERENTIAL    PROTIME-INR   COMPREHENSIVE METABOLIC PANEL   B-TYPE NATRIURETIC PEPTIDE   TROPONIN I, HIGH SENSITIVITY     Pain Management Panel           No data to display              XR chest 1 view    (Results Pending)       ED Course & MDM   ED Course as of 12/28/23 1452   Thu Dec 28, 2023   1122 EKG shows sinus rhythm at a rate of 58  QRS 99 QTc 433 there is no ST elevation or axis deviation [RB]      ED Course User Index  [RB] Blossom Wardwith, APRN-CNP         Diagnoses as of 12/28/23 1452   Hemoptysis       Medical Decision Making  On initial evaluation patient is well-appearing the emergency department at this time.  I did review her most recent hospitalization as well as biopsy.  Due to patient's history of pulmonary embolism with the possible hemoptysis lab work and CT angio imaging were obtained.  Troponin of 5  which is improved from baseline she does not appear to be fluid volume overloaded at this point CMP and CBC are within normal limits no signs of anemia.  Chest x-ray shows a possible mild opacity adjacent to the right border CT angio shows improved right upper lobe partially occlusive pulmonary embolism with no new pulmonary emboli visualized.  Spiculated left lower lobe lesion most consistent with neoplasm no change or size from prior exam no evidence of significant pulmonary hemorrhage or pneumothorax does have stable nodular densities in the adrenal glands.  On reevaluation patient remains well-appearing.  She is laughing in the room with me.  Oxygen saturations have maintained 91 to 92%.  States this is normally where she sits with her COPD.  We did reach out to respiratory therapy to try and get humidified air for her.  I also educated her on the use of a humidifier at home.  She would like to go home at this point.  I discussed strict return precautions with her and close outpatient follow-up.  Patient verbalizes understanding and agreement this plan she has no further questions or  concerns and will be discharged home in stable condition.        Procedure  Procedures      I discussed the differential, results and discharge plan with the patient and/or family/friend/caregiver if present.  I emphasized the importance of follow-up with the physician I referred them to in the timeframe recommended.  I explained reasons for the patient to return to the Emergency Department. Additional verbal discharge instructions were also given and discussed with the patient to supplement those generated by the EMR. We also discussed medications that were prescribed (if any) including common side effects and interactions. The patient was advised to abstain from driving, operating heavy machinery or making significant decisions while taking medications such as opiates and muscle relaxers that may impair this. All questions were addressed.  They understand return precautions and discharge instructions. The patient and/or family/friend/caregiver expressed understanding.           Blossom Gracia, VAL-MANI  12/28/23 [id number]

## 2023-12-29 LAB
ATRIAL RATE: 58 BPM
P AXIS: 76 DEGREES
PR INTERVAL: 148 MS
Q ONSET: 249 MS
QRS COUNT: 9 BEATS
QRS DURATION: 99 MS
QT INTERVAL: 440 MS
QTC CALCULATION(BAZETT): 433 MS
QTC FREDERICIA: 434 MS
R AXIS: 64 DEGREES
T AXIS: 44 DEGREES
T OFFSET: 469 MS
VENTRICULAR RATE: 58 BPM

## 2024-01-04 LAB
ELECTRONICALLY SIGNED BY: NORMAL
FOCUSED SOLID TUMOR DNA/RNA RESULTS: NORMAL

## 2024-01-08 ENCOUNTER — OFFICE VISIT (OUTPATIENT)
Dept: PULMONOLOGY | Facility: HOSPITAL | Age: 79
End: 2024-01-08
Payer: MEDICAID

## 2024-01-08 VITALS
BODY MASS INDEX: 21 KG/M2 | RESPIRATION RATE: 16 BRPM | HEART RATE: 69 BPM | TEMPERATURE: 96.8 F | SYSTOLIC BLOOD PRESSURE: 104 MMHG | WEIGHT: 123 LBS | HEIGHT: 64 IN | DIASTOLIC BLOOD PRESSURE: 61 MMHG

## 2024-01-08 DIAGNOSIS — C34.92 NON-SMALL CELL CANCER OF LEFT LUNG (MULTI): Primary | ICD-10-CM

## 2024-01-08 DIAGNOSIS — J96.11 CHRONIC RESPIRATORY FAILURE WITH HYPOXIA (MULTI): ICD-10-CM

## 2024-01-08 DIAGNOSIS — F06.4 ANXIETY DISORDER DUE TO KNOWN PHYSIOLOGICAL CONDITION: ICD-10-CM

## 2024-01-08 DIAGNOSIS — J44.9 CHRONIC OBSTRUCTIVE PULMONARY DISEASE, UNSPECIFIED COPD TYPE (MULTI): ICD-10-CM

## 2024-01-08 DIAGNOSIS — F17.210 TOBACCO DEPENDENCE DUE TO CIGARETTES: ICD-10-CM

## 2024-01-08 PROCEDURE — 99213 OFFICE O/P EST LOW 20 MIN: CPT | Performed by: NURSE PRACTITIONER

## 2024-01-08 PROCEDURE — 1036F TOBACCO NON-USER: CPT | Performed by: NURSE PRACTITIONER

## 2024-01-08 PROCEDURE — 1159F MED LIST DOCD IN RCRD: CPT | Performed by: NURSE PRACTITIONER

## 2024-01-08 PROCEDURE — 3074F SYST BP LT 130 MM HG: CPT | Performed by: NURSE PRACTITIONER

## 2024-01-08 PROCEDURE — 99213 OFFICE O/P EST LOW 20 MIN: CPT | Mod: ZK | Performed by: NURSE PRACTITIONER

## 2024-01-08 PROCEDURE — 1125F AMNT PAIN NOTED PAIN PRSNT: CPT | Performed by: NURSE PRACTITIONER

## 2024-01-08 PROCEDURE — 3078F DIAST BP <80 MM HG: CPT | Performed by: NURSE PRACTITIONER

## 2024-01-08 ASSESSMENT — ENCOUNTER SYMPTOMS
SHORTNESS OF BREATH: 1
COUGH: 1
NERVOUS/ANXIOUS: 1

## 2024-01-08 NOTE — PROGRESS NOTES
Subjective   Patient ID: Abigail Aquino is a 78 y.o. female who presents for No chief complaint on file..  HPI  Patient is a 77 y/o female with PMH of PE on Eliquis, COPD, and nicotine dependence. She was referred for an enlarging lung nodule on CT chest. She gets shortness of breath on exertion. She denies a cough on a daily basis. She reports some unintentional weight loss. She is supposed to wear 3L continuous oxygen. She states that she heard herself wheezing prior to starting on oxygen. The nodule was first found in 2021 when she had a PE but did not follow up on testing. She denies any fever, chills, chest pain, or hemoptysis. She quit smoking daily one month ago but also states that she will smoke 1-3 cig per day when stressful, she mostly smoked 1-2 ppd. She denies any family history of lung cancer.     She is here for follow up. She states that her TUBBS is progressing. Denies worsening of cough or chest pain. She appears frustrated because her daughter and son live out of state and she has no help, lives by herself for 25 years. She did not complete PET scan due to anxiety and claustrophobia too. She states she has not smoked at all since Sep 2023. She wants POC but states she cannot carry if more than 2 lbs. She would like to get contact information for [facility].     Today she is here for biopsy results. She states that her breathing has been improved with starting Breztri. She is anxious for biopsy results.     Review of Systems   Respiratory:  Positive for cough and shortness of breath.    Psychiatric/Behavioral:  The patient is nervous/anxious.    All other systems reviewed and are negative.      Objective   Physical Exam  Vitals and nursing note reviewed.   Constitutional:       Appearance: Normal appearance.   HENT:      Head: Normocephalic.      Nose: Nose normal.      Mouth/Throat:      Pharynx: Oropharynx is clear.   Eyes:      Extraocular Movements: Extraocular movements intact.       Conjunctiva/sclera: Conjunctivae normal.      Pupils: Pupils are equal, round, and reactive to light.   Cardiovascular:      Rate and Rhythm: Normal rate and regular rhythm.      Pulses: Normal pulses.      Heart sounds: Normal heart sounds.   Pulmonary:      Effort: Pulmonary effort is normal.      Breath sounds: Normal breath sounds.   Abdominal:      General: Bowel sounds are normal.      Palpations: Abdomen is soft.   Musculoskeletal:         General: Normal range of motion.      Cervical back: Normal range of motion.   Skin:     General: Skin is warm.   Neurological:      General: No focal deficit present.      Mental Status: She is alert and oriented to person, place, and time. Mental status is at baseline.   Psychiatric:         Mood and Affect: Mood normal.         Behavior: Behavior normal.         Assessment/Plan   1. Adenocarcinoma of the lung   2. COPD  3. Nicotine dependence  4. Chronic respiratory failure with hypoxia at 3 L  5. Anxiety disorder     Plan:     Patient was referred for a spiculated lung nodule in the LLL measuring 2.7 cm X 1.1 cm on CT chest done 8/27/23 that has enlarged from 1.0 cm X 1.4 cm. in 2021. Discussed at length this is suspicious for malignancy and she is also high risk due to smoking history. She does report some unintentional weight loss of about 5-10 lbs. Discussed CT at length and recommend PET scan now and discussed potential need for biopsy based on results. Patient is agreeable to plan of care, PET scan ordered and scheduled.     Patient ended up hospitalized when she was scheduled for her PET scan so she underwent biopsy 12/21 which came back for adenocarcinoma. I discussed results at length and instructed her the importance of getting PET done as soon as possible. We got her scheduled for this week.      -PET scan reordered STAT.  -PFTs with FEV1 31%,   -Continue Breztri. Continue albuterol prn. Start nebulizer.   -6MWT ordered, she is on 3L continuous oxygen.  -She  states that she has mostly smoked 1-2 ppd and started when she was 11. She states one month ago she cut back to 1-3 cigs per day when she has a stressful day. Counseled for 5 min to abstain from smoking. She states she has not however smoked since September.      I provided reassurance to patient. She does not have good social support. I provided help in terms of getting POC home O2 and getting her scan done. She initially wanted PET under anesthesia but cannot go to [facility]. She would try to do it with sedation. I recommend hydroxyzine for anxiety. Avoid BZD due to severe copd. I will also optimize her copd with triple therapy.    Overall we discussed biopsy results at length. I instructed her on the importance of getting PET scan as soon as possible. I will get patient scheduled with oncology as well. She was frustrated that [facility] is not accepting new patient at this time. I will bring her back with Dr. Hernandez in 4 months for COPD management.

## 2024-01-08 NOTE — PATIENT INSTRUCTIONS
Continue Breztri 2 puffs twice daily.  Continue albuterol prn.   Please complete your PET scan as soon as possible.   Call with any questions or concerns.  Follow up with Dr. Hernandez in 4 months.

## 2024-01-09 ENCOUNTER — DOCUMENTATION (OUTPATIENT)
Dept: HEMATOLOGY/ONCOLOGY | Facility: CLINIC | Age: 79
End: 2024-01-09
Payer: MEDICAID

## 2024-01-09 NOTE — PROGRESS NOTES
Discussed patient with Karon Thornton NP. Patient to have PET Scan on 1.11.24. Patient needs [facility]. Patient prefers [facility] who is not accepting new patients. This RN called patient and spoke at length with patient advising of need for MD visit. Initially RN offered patient 1.18.24 NPV at [facility] with all further follow ups at [facility] to which patient initially accepted then declined due to unfamiliar location as she still drives. 1.24.24 then offered to patient at [facility] which is her second preferred location. Patient then stated that she will agree to this appointment but if it snows, she is cancelling as her safety is more important that the visit. Rn acknowledged this and stated that RN could try to reach out to the [facility] provider and see if he would be willing to accept this new patient given circumstances of age/driving/etc. Patient then stated why would you do that, I do not want to be added to some busy doctors schedule if he won't always have time to see me. Thus, patient declined RN to reach out to [facility] Oncologist. Address to [facility] review with patient. No additional needs at this time.

## 2024-01-11 ENCOUNTER — HOSPITAL ENCOUNTER (OUTPATIENT)
Dept: RADIOLOGY | Facility: HOSPITAL | Age: 79
Discharge: HOME | End: 2024-01-11
Payer: COMMERCIAL

## 2024-01-11 DIAGNOSIS — C34.92 NON-SMALL CELL CANCER OF LEFT LUNG (MULTI): ICD-10-CM

## 2024-01-11 PROCEDURE — A9552 F18 FDG: HCPCS | Performed by: NURSE PRACTITIONER

## 2024-01-11 PROCEDURE — 78815 PET IMAGE W/CT SKULL-THIGH: CPT | Mod: PET TUMOR INIT TX STRAT | Performed by: STUDENT IN AN ORGANIZED HEALTH CARE EDUCATION/TRAINING PROGRAM

## 2024-01-11 PROCEDURE — 3430000001 HC RX 343 DIAGNOSTIC RADIOPHARMACEUTICALS: Performed by: NURSE PRACTITIONER

## 2024-01-11 PROCEDURE — 78815 PET IMAGE W/CT SKULL-THIGH: CPT | Mod: PI

## 2024-01-11 RX ORDER — FLUDEOXYGLUCOSE F 18 200 MCI/ML
13.4 INJECTION, SOLUTION INTRAVENOUS
Status: COMPLETED | OUTPATIENT
Start: 2024-01-11 | End: 2024-01-11

## 2024-01-11 RX ADMIN — FLUDEOXYGLUCOSE F 18 13.4 MILLICURIE: 200 INJECTION, SOLUTION INTRAVENOUS at 13:53

## 2024-01-12 ENCOUNTER — TELEPHONE (OUTPATIENT)
Dept: PULMONOLOGY | Facility: HOSPITAL | Age: 79
End: 2024-01-12
Payer: MEDICAID

## 2024-01-12 NOTE — TELEPHONE ENCOUNTER
Patient acknowledged understanding.  ----- Message from VAL Ley-CNP sent at 1/12/2024  4:20 PM EST -----  Please call the patient and let her know that PET scan shows that just the lung nodule lit up, no evidence of spread of cancer based on this. Oncology should be reaching out to her to get her set up.

## 2024-01-18 ENCOUNTER — APPOINTMENT (OUTPATIENT)
Dept: HEMATOLOGY/ONCOLOGY | Facility: HOSPITAL | Age: 79
End: 2024-01-18
Payer: COMMERCIAL

## 2024-01-22 NOTE — PROGRESS NOTES
Patient ID: Abigail Aquino is a 78 y.o. female    Primary Care Provider: Vikki Marquez MD    DIAGNOSIS AND STAGING  Tentative stage IB ([id number]) NSCLC (adenocarcinoma) of the LLL - dx on 12/21/23     SITES OF DISEASE  Left lower lobe mass 3.1 cm      MOLECULAR GENOMICS  MICROSATELLITE STATUS: Microsatellite Instability-High (MSI-H) is NOT DETECTED.     DISEASE ASSOCIATED GENOMIC FINDINGS:   EGFR p.G719S ([id number] c.2155G>A)     INTERPRETATION AND POTENTIAL THERAPEUTIC OPTIONS:  EGFR p.G719S  FDA RECOMMENDATIONS (Advanced Non Small Cell Lung Cancer):  afatinib (First-line)  NCCN RECOMMENDATIONS (Advanced Non Small Cell Lung Cancer):  afatinib (First-line, preferred)  osimertinib (First-line, preferred)  erlotinib (First-line)  gefitinib (First-line)  dacomitinib (First-line)     DISEASE RELEVANT ALTERATIONS NOT DETECTED:   Negative for ALK fusion.  Negative for BRAF V600E.  Negative for ERBB2 activating mutation  Negative for KRAS G12C.  Negative for MET exon 14 skipping mutation.  Negative for NTRK fusion.  Negative for RET fusion.  Negative for ROS1 fusion.    PD-L1 TPS < 1%     PRIOR THERAPIES  None     CURRENT THERAPY  To be determined    CURRENT ONCOLOGICAL PROBLEMS  None     HISTORY OF PRESENT ILLNESS  This is a current 60-pack-year smoker, with history of COPD and oxygen dependence (3 L/min, FEV1 31%), who after a fall leading to fracture of her lower extremity in October 2021, was diagnosed with a pulmonary embolism. However, a CTA demonstrated a 1.4 cm nodular opacity in the left lower lobe.    A subsequent CTA obtained on 08/27/2023 demonstrated the left lower lobe pulmonary nodule to be now measuring 2.7 cm in greatest diameter.  There was no associated adenopathy.  No acute PE was demonstrated.    On 12/14/2023 the patient presented once again to the emergency department complaining of chest pain.  This time a CTA demonstrated the left lower lobe mass to be measuring 3.2 cm.  There was  subtle right upper lobe subsegmental filling defects in the pulmonary artery compatible with small volume PE.    On 12/21/23 the pt had a CT-guided biopsy:  FINAL DIAGNOSIS   A. LUNG, LEFT LOWER LOBE; BIOPSY:    -- ADENOCARCINOMA, MICROPAPILLARY AND ACINAR PATTERNS. See comment     On 01/11/2024 the patient had a PET scan that demonstrated SUV max 3.4 up to 3.1 cm left lower lobe spiculated nodule.  A pleural-based groundglass opacification in the right upper lobe has no FDG avidity and a 7 mm left subpectoralis node has an SUV mass of 1.3, nonspecific.  Focal FDG avidity with SUV max 2.9 demonstrated in the left anterior second rib, compatible with a subacute fracture.  There is also mild FDG avidity in the left adrenal gland, similar to CT from 10/06/2021, compatible with an adenoma.      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  She brings her friend Lexy with her.  She currently has no oncological complaints  Has had issues with recurrent edema of lower extremities complicated by cellulitis and a large blister in her left foot  There  are no neurological symptoms and no headaches  She has intermittent left-sided chest pain for which she has been in the emergency room a few times over the last several months, undergoing CT PE is demonstrating progressive enlargement of the left lower lobe nodule.  She is oxygen dependent  There is no  unintentional weight loss    A 13 point review of systems was performed, with significant findings documented above in subjective history.    OBJECTIVE:  Vitals:    01/24/24 1538   BP: 156/81   Pulse: 80   Resp: 18   Temp: 37.2 °C (99 °F)   SpO2: 90%      Body surface area is 1.57 meters squared.     Wt Readings from Last 5 Encounters:   01/24/24 55.9 kg (123 lb 3.8 oz)   01/08/24 55.8 kg (123 lb)   12/28/23 54.4 kg (120 lb)   12/14/23 54.4 kg (120 lb)   12/08/23 54.4 kg (120 lb)        ECOGSCORE: 1- Restricted in physically strenuous activity.  Carries out light duty.    Physical Exam  Constitutional:       Appearance: Normal appearance.   HENT:      Head: Normocephalic and atraumatic.   Eyes:      General: No scleral icterus.     Extraocular Movements: Extraocular movements intact.      Conjunctiva/sclera: Conjunctivae normal.   Cardiovascular:      Rate and Rhythm: Normal rate and regular rhythm.   Pulmonary:      Effort: Pulmonary effort is normal.      Comments: Decreased breath sounds throughout both lung fields  Abdominal:      Palpations: Abdomen is soft. There is no mass.      Tenderness: There is no abdominal tenderness. There is no guarding.   Musculoskeletal:      Cervical back: Neck supple.      Right lower leg: No edema.      Left lower leg: No edema.   Lymphadenopathy:      Cervical: No cervical adenopathy.   Skin:     General: Skin is warm and dry.      Coloration: Skin is not pale.      Findings: No erythema or rash.   Neurological:      General: No focal deficit present.      Mental Status: She is alert and oriented to person, place, and time. Mental status is at baseline.      Motor: No weakness.       Gait: Gait normal.   Psychiatric:         Mood and Affect: Mood normal.         Behavior: Behavior normal.         Thought Content: Thought content normal.         Judgment: Judgment normal.        Diagnostic Results   Results:  Labs:  Lab Results   Component Value Date    WBC 9.3 12/28/2023    HGB 14.8 12/28/2023    HCT 43.3 12/28/2023    MCV 98 12/28/2023     12/28/2023      Lab Results   Component Value Date    NEUTROABS 7.13 (H) 12/28/2023        Lab Results   Component Value Date    GLUCOSE 83 12/28/2023    CALCIUM 8.2 (L) 12/28/2023     12/28/2023    K 4.2 12/28/2023    CO2 28 12/28/2023     12/28/2023    BUN 8 12/28/2023    CREATININE 0.64 12/28/2023    MG 2.00 12/14/2023     Lab Results   Component Value Date    ALT 14 12/28/2023    AST 18 12/28/2023    ALKPHOS 67 12/28/2023    BILITOT 0.6 12/28/2023      Lab Results   Component Value Date    TSH 2.44 10/18/2021     STUDY:  NM PET CT LUNG CA  INITIAL DIAGNOSIS;  1/11/2024 3:41 pm      INDICATION:  Signs/Symptoms:non small cell lung cancer staging.      COMPARISON:  CT angio chest on 12/28/2023      ACCESSION NUMBER(S):  [id number]      ORDERING CLINICIAN:  FABIAN ARRIAGA      TECHNIQUE:  DIVISION OF NUCLEAR MEDICINE  POSITRON EMISSION TOMOGRAPHY (PET-CT)      The patient received an intravenous dose of 13.4 mCi of Fluorine-18  fluorodeoxyglucose (FDG). Positron emission tomographic (PET) images  from mid thigh to skull base were then acquired after a one hour  delay. Also acquired was a contemporaneous low dose non-contrast CT  scan performed for attenuation correction of PET images and anatomic  localization.  The PET and CT images were digitally fused for  display.  All images were acquired on a combined PET-CT scanner unit.  Some areas of FDG accumulation may be described in standardized  uptake value (SUV) units.      CODING:  Subsequent Treatment Strategy (PS)      CALIBRATION:  Dose Injection-to-Scan Interval (mins): 62  min  Mediastinal bloodpool SUV (normal 1.5-2.5): 1.7  Blood glucose: 119 mg/dL      FINDINGS:  HEAD AND NECK:  * No evidence of focal hypermetabolic lesion in the brain parenchyma,  noting that evaluation is limited because of the expected physiologic  diffuse FDG uptake in the brain. * No evidence of paranasal sinus  disease * No enlarged or FDG avid cervical lymphadenopathy is present.  *Unremarkable thyroid glands      CHEST:  *Again seen, a spiculated 3.1 cm left lower lobe nodule is FDG avid  with SUV max of 3.4. Adjacent to it, there is a non FDG avid 3 mm  pulmonary nodule. Additionally, there is a non FDG avid irregular  pleural-based ground-glass opacity within the right lung apex *There  is minimally FDG avid 7 mm left subpectoral node (with SUV max of  1.3). Otherwise, no new enlarged or FDG avid mediastinal, hilar or  right axillary lymphadenopathy. *Unremarkable both breasts.      ABDOMEN AND PELVIS:  *No enlarged or FDG avid lymphadenopathy in the abdomen and pelvis.  *Physiologic radiotracer uptake is present in the liver and spleen  with excretion into the bowel loops and the genitourinary tract.There  are few regions of photopenia within dome of the liver, corresponding  to fluid attenuating lesions, likely representing simple cysts *Mild  FDG avidity involving nodular thickening within the left adrenal  gland with SUV max of 2.2. Otherwise, right adrenal gland is  unremarkable.      MUSCULOSKELETAL:  *Focal FDG avidity with SUV max of 2.9 corresponding to a subacute  fracture within left anterior 2nd rib. Otherwise, no other concerning  FDG avid bone lesion throughout axial and appendicular skeleton      IMPRESSION:  1. FDG avid mass in the left lower lobe, compatible with  biopsy-proven non-small cell lung cancer. 3 mm non FDG avid pulmonary  nodule adjacent to it, indeterminate, attention on follow-up study.  2. Irregular non FDG avid pleural-based ground-glass opacity within  the right lung  apex, likely nonspecific.  3. FDG avidity involving the left anterior 2nd rib, corresponding to  a subacute fracture with callus formation. Minimal FDG avid 7 mm left  subpectoral node adjacent to the fracture, favored to be reactive.  4. Mild FDG avid left adrenal thickening, similar to CT from  10/06/2021, and likely represents a benign process, such as adenoma.  5. Otherwise, no PET evidence of sarina or distant metastasis    STUDY:  CT Angiogram of the Chest; 12/28/23 at 12:50 PM  INDICATION:  Hemoptysis.  Recent lung biopsy.  History PE. LLL nodule.   COMPARISON:  Chest XR same date.  CT lung biopsy 12/21/23. CTA chest 12/14/23.  ACCESSION NUMBER(S):  [id number]  ORDERING CLINICIAN:  CHAPO MOMIN  TECHNIQUE:  CTA of the chest was performed with intravenous contrast.   Images are reviewed and processed at a workstation according to the CT  angiogram protocol with 3-D and/or MIP post processing imaging  generated.  Omnipaque 350 75 mL was administered intravenously.  Automated mA/kV exposure control was utilized and patient examination  was performed in strict accordance with principles of ALARA.  FINDINGS:  Pulmonary arteries are adequately opacified.  The previously noted  subsegmental right upper lobe embolus is improved when compared with  the prior study with less evidence of a thrombus burden on (4-144)  sees.  No definite new pulmonary emboli are visualized at this time..   The thoracic aorta is normal in course and caliber without dissection  or aneurysm.  The heart is normal in size without pericardial effusion.  Coronary  artery calcifications are stable.  Thoracic lymph nodes are not  enlarged.  There is no pleural effusion, pleural thickening, or pneumothorax.   The airways are patent.  There are stable emphysematous changes lung fields bilaterally.  There  is a spiculated 3.1 x 1.3 cm lesion within the left lower lobe (5-193)  unchanged in size and appearance when compared with the prior exam.    There are adjacent subcentimeter nodular density is visualized (5-191)  which may represent satellite lesions versus sequelae from a recent  lung biopsy.  These are not well visualized on the prior exam.  There  are stable linear right lower lobe scarring/atelectasis..  Upper abdomen demonstrates no acute pathology.  A 2.0 cm low-density  lesion within the right hepatic lobe is again noted unchanged in size  and appearance compared to the prior study.  This may represent a  cyst.  Other subsegmental lesions are also noted likely representing  cysts which are unchanged.  Is nodular thickening of the adrenal  glands bilaterally with a nodular density on the left measuring 9 mm  in diameter and on the right 1.1 cm in diameter (4-282).  Is stable  compared to the prior exam.  There are no acute fractures.  No suspicious bony lesions.  IMPRESSION:  1.  Improved right upper lobe partially occlusive pulmonary embolus.   No new pulmonary emboli visualized on this exam.  2.  Spiculated left lower lobe lesion most consistent with a neoplasm.   No change in size or to the prior exam.  There our adjacent nodular  densities noted.  These could represent sequelae of prior lung biopsy  versus satellite lesions.  These were not visualized on the prior exam  favoring a more acute etiology.  3.  No definite evidence of significant surrounding pulmonary  hemorrhage, pneumothorax.  4.  Nodular densities within the adrenal glands bilaterally which are  stable.  This could represent adenomas.  Cannot totally exclude  possible metastatic disease.  MRI may be helpful for further  evaluation if clinically indicated.    Assessment/Plan     No matching staging information was found for the patient.  Tentative stage IB ([id number]) NSCLC (adenocarcinoma) of the LLL -   Tumor measures 3.1 cm - 3 mm adjacent nodule is not FDG avid   Tumor has a non-classic EGFR mutation as above   No impact on choice of therapy as of now   FEV1 is 31% - she is  on 3L oxygen continuously   Referral placed for Rad Onc evaluation today and she was scheduled to see Dr. Clif Wooten on 02/05/2024 for discussion of possible SBRT -   Lives in [place]   Referral for systematic mediastinal staging with IP/Advanced Diagnostic team also provided   I will follow-up on results of systematic mediastinal staging on 02/01/2024      This note was created with the assistance of a speech recognition program.  Although the intention is to generate a document that actually reflects the content of the visit, it is possible that some mistakes occur and may not be corrected by the time of completion of this note.        Emily Castañeda MD, MS  Thoracic Medical Oncology   [address] [address]  Phone: [phone]

## 2024-01-23 DIAGNOSIS — I25.10 ATHEROSCLEROSIS OF NATIVE CORONARY ARTERY WITHOUT ANGINA PECTORIS, UNSPECIFIED WHETHER NATIVE OR TRANSPLANTED HEART: ICD-10-CM

## 2024-01-23 RX ORDER — FUROSEMIDE 20 MG/1
20 TABLET ORAL DAILY
Qty: 90 TABLET | Refills: 0 | Status: SHIPPED | OUTPATIENT
Start: 2024-01-23 | End: 2024-05-02 | Stop reason: SDUPTHER

## 2024-01-23 NOTE — TELEPHONE ENCOUNTER
Rx Refill Request Telephone Encounter    Name:  Abigail Laralamar  :  [id number]  Medication Name:    furosemide (Lasix) 20 mg tablet     oral  daily  Quantity : 90    Specific Pharmacy location:  [facility]  Date of last appointment:    Date of next appointment:    Best number to reach patient:  [phone]

## 2024-01-24 ENCOUNTER — OFFICE VISIT (OUTPATIENT)
Dept: HEMATOLOGY/ONCOLOGY | Facility: CLINIC | Age: 79
End: 2024-01-24
Payer: COMMERCIAL

## 2024-01-24 VITALS
HEIGHT: 63 IN | SYSTOLIC BLOOD PRESSURE: 156 MMHG | RESPIRATION RATE: 18 BRPM | TEMPERATURE: 99 F | OXYGEN SATURATION: 90 % | BODY MASS INDEX: 21.84 KG/M2 | HEART RATE: 80 BPM | WEIGHT: 123.24 LBS | DIASTOLIC BLOOD PRESSURE: 81 MMHG

## 2024-01-24 DIAGNOSIS — R91.1 LUNG NODULE: ICD-10-CM

## 2024-01-24 DIAGNOSIS — Z01.818 PRE-OP TESTING: ICD-10-CM

## 2024-01-24 DIAGNOSIS — C34.32 PRIMARY CANCER OF LEFT LOWER LOBE OF LUNG (MULTI): Primary | ICD-10-CM

## 2024-01-24 PROCEDURE — 3079F DIAST BP 80-89 MM HG: CPT | Performed by: INTERNAL MEDICINE

## 2024-01-24 PROCEDURE — 99205 OFFICE O/P NEW HI 60 MIN: CPT | Performed by: INTERNAL MEDICINE

## 2024-01-24 PROCEDURE — 1126F AMNT PAIN NOTED NONE PRSNT: CPT | Performed by: INTERNAL MEDICINE

## 2024-01-24 PROCEDURE — 1036F TOBACCO NON-USER: CPT | Performed by: INTERNAL MEDICINE

## 2024-01-24 PROCEDURE — 99215 OFFICE O/P EST HI 40 MIN: CPT | Performed by: INTERNAL MEDICINE

## 2024-01-24 PROCEDURE — 3077F SYST BP >= 140 MM HG: CPT | Performed by: INTERNAL MEDICINE

## 2024-01-24 PROCEDURE — 1159F MED LIST DOCD IN RCRD: CPT | Performed by: INTERNAL MEDICINE

## 2024-01-24 ASSESSMENT — PAIN SCALES - GENERAL: PAINLEVEL: 0-NO PAIN

## 2024-01-24 NOTE — PATIENT INSTRUCTIONS
You have stage IB non-small cell lung cancer based on his scans.  It is located in the left lower lobe.  The type is non-small cell carcinoma and the subtype is adenocarcinoma.  The tumor measures 3.1 cm.  In order to make sure disease stage Ib lung cancer, we need to perform biopsies of the lymph nodes in the middle of the chest.  This may increase her stage to 2 or 3, depending on the fact of lymph nodes being involved in which type of lymph nodes are involved.  This will be done through a bronchoscopy, with anesthesia.  After the bronchoscopy, if the lymph nodes are not involved, you will be treated with radiation alone.  I have placed a referral for you to see Dr. Clif Wooten closer to home, for potential SBRT (stereotactic body radiotherapy).  This is high-dose radiation delivered over the course of 4-5 treatments.  Unfortunately you are not a candidate for surgery because of your advanced COPD.  If the lymph nodes are involved, will have to discuss different type of radiation that is given Monday through Friday for 6 weeks.  This will involve also the use of chemotherapy.  I will be following up on the results of the bronchoscopy through a phone visit after the procedure is done.  If the lymph nodes are involved, will also need an MRI of your brain to make sure the tumor has not metastasized to the brain (unlikely).

## 2024-01-24 NOTE — PROGRESS NOTES
Bronchoscopy Scheduling Request    Pre-bronchoscopy visit: New patient visit with Bronchoscopy group provider  Please schedule procedure: Next available    Cytology on-site:  Yes  Location:  Either location  Performing physician:  Advanced diagnostic bronchoscopist  Referring physician:  Emily Castañeda MD, Vikki Marquez MD  Indication:  NSCLC left nodule,   Sedation / Anesthesia:  GA  Procedure:  Staging EBUS  Time:  Tier 2  Fluorscopy:   No  Imaging needed:  None  Labs:  None  Meds:  Eliquis  Special Considerations:  None  Reviewed by:  Rigoberto Manuel MD

## 2024-01-24 NOTE — TELEPHONE ENCOUNTER
I sent in her prescription for 90 days but she was in the ER 2 times and hospitalized in Mid December. She needs to be seen for hospital follow up.     Also she had PE in December and Looks like she ran out of Eliquis one week ago. Does she have a prescription to continue her Eliquis?

## 2024-01-31 ENCOUNTER — DOCUMENTATION (OUTPATIENT)
Dept: CARDIOLOGY | Facility: CLINIC | Age: 79
End: 2024-01-31
Payer: MEDICAID

## 2024-01-31 NOTE — PROGRESS NOTES
Request from Dr. Dallas for Bronchoscopy on 24 to hold Eliquis 2 days prior to procedure    Diagnosis/what are we seeing for:  PMH is significant for CAD s/p PCI x1 in , h/o acute PE in 2021, hyperlipidemia, HTN, ulcerative colitis, COPD and lung nodules     Last ischemic work up: N/A  Anticoagulation: Apixaban-Requesting to hold for 2 days prior to procedure  Antiplatelet:  N/A  Has patient had a recent cardioversion or ablation?  no     Last seen by Dr. Carrillo 11/3/23    Next appointment:  24 with Lydia Madden, CNP                         [address] [address]                   Phone# [phone]              Fax# [phone]      Date: 24    RE: Abigail Aquino            : 1945       Surgical/Procedural Clearance for:  Bronchoscopy  Patient is at: LOW cardiovascular risk for this LOW risk procedure.           N/A hold Antiplatelet      Can hold Anticoagulant Apixaban for 2 days prior                     Is further cardiac workup is needed prior to the procedure?  No     Patient should continue Beta Blocker in the perioperative period.  Yes     Patient should resume antiplatelet/anticoagulation as soon as cleared by surgeon/procedure physician.  Yes       Thank You,    24 at 10:09 AM - Bobo Carrillo MD

## 2024-02-01 ENCOUNTER — OFFICE VISIT (OUTPATIENT)
Dept: HEMATOLOGY/ONCOLOGY | Facility: HOSPITAL | Age: 79
End: 2024-02-01
Payer: COMMERCIAL

## 2024-02-01 DIAGNOSIS — C34.32 PRIMARY CANCER OF LEFT LOWER LOBE OF LUNG (MULTI): Primary | ICD-10-CM

## 2024-02-01 PROCEDURE — 99213 OFFICE O/P EST LOW 20 MIN: CPT | Mod: 95 | Performed by: INTERNAL MEDICINE

## 2024-02-01 PROCEDURE — 1036F TOBACCO NON-USER: CPT | Performed by: INTERNAL MEDICINE

## 2024-02-01 PROCEDURE — 1126F AMNT PAIN NOTED NONE PRSNT: CPT | Performed by: INTERNAL MEDICINE

## 2024-02-01 PROCEDURE — 1159F MED LIST DOCD IN RCRD: CPT | Performed by: INTERNAL MEDICINE

## 2024-02-01 PROCEDURE — 99213 OFFICE O/P EST LOW 20 MIN: CPT | Performed by: INTERNAL MEDICINE

## 2024-02-01 NOTE — PROGRESS NOTES
Patient ID: Abigail Aquino is a 78 y.o. female    Primary Care Provider: Vikki Marquez MD    DIAGNOSIS AND STAGING  Tentative stage IB ([id number]) NSCLC (adenocarcinoma) of the LLL - dx on 12/21/23     SITES OF DISEASE  Left lower lobe mass 3.1 cm      MOLECULAR GENOMICS  MICROSATELLITE STATUS: Microsatellite Instability-High (MSI-H) is NOT DETECTED.     DISEASE ASSOCIATED GENOMIC FINDINGS:   EGFR p.G719S ([id number] c.2155G>A)     INTERPRETATION AND POTENTIAL THERAPEUTIC OPTIONS:  EGFR p.G719S  FDA RECOMMENDATIONS (Advanced Non Small Cell Lung Cancer):  afatinib (First-line)  NCCN RECOMMENDATIONS (Advanced Non Small Cell Lung Cancer):  afatinib (First-line, preferred)  osimertinib (First-line, preferred)  erlotinib (First-line)  gefitinib (First-line)  dacomitinib (First-line)     DISEASE RELEVANT ALTERATIONS NOT DETECTED:   Negative for ALK fusion.  Negative for BRAF V600E.  Negative for ERBB2 activating mutation  Negative for KRAS G12C.  Negative for MET exon 14 skipping mutation.  Negative for NTRK fusion.  Negative for RET fusion.  Negative for ROS1 fusion.    PD-L1 TPS < 1%     PRIOR THERAPIES  None     CURRENT THERAPY  To be determined    CURRENT ONCOLOGICAL PROBLEMS  None     HISTORY OF PRESENT ILLNESS  This is a current 60-pack-year smoker, with history of COPD and oxygen dependence (3 L/min, FEV1 31%), who after a fall leading to fracture of her lower extremity in October 2021, was diagnosed with a pulmonary embolism. However, a CTA demonstrated a 1.4 cm nodular opacity in the left lower lobe.    A subsequent CTA obtained on 08/27/2023 demonstrated the left lower lobe pulmonary nodule to be now measuring 2.7 cm in greatest diameter.  There was no associated adenopathy.  No acute PE was demonstrated.    On 12/14/2023 the patient presented once again to the emergency department complaining of chest pain.  This time a CTA demonstrated the left lower lobe mass to be measuring 3.2 cm.  There was  subtle right upper lobe subsegmental filling defects in the pulmonary artery compatible with small volume PE.    On 12/21/23 the pt had a CT-guided biopsy:  FINAL DIAGNOSIS   A. LUNG, LEFT LOWER LOBE; BIOPSY:    -- ADENOCARCINOMA, MICROPAPILLARY AND ACINAR PATTERNS. See comment     On 01/11/2024 the patient had a PET scan that demonstrated SUV max 3.4 up to 3.1 cm left lower lobe spiculated nodule.  A pleural-based groundglass opacification in the right upper lobe has no FDG avidity and a 7 mm left subpectoralis node has an SUV mass of 1.3, nonspecific.  Focal FDG avidity with SUV max 2.9 demonstrated in the left anterior second rib, compatible with a subacute fracture.  There is also mild FDG avidity in the left adrenal gland, similar to CT from 10/06/2021, compatible with an adenoma.      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   No history of alcohol abuse       FAMILY HISTORY  Daughter had cervical cancer    CURRENT MEDS REVIEWED       ALLERGIES REVIEWED        SUBJECTIVE:  Verbal consent obtained for her Telehealth/phone appt   Overwhelmed by her appointments   States her friend will not be able to drive her to her bronchoscopy appointment early in AM and wonders what can be done to rearrange time for the procedure   No other new sx or concerns   Continues to use oxygen continuously       Last visit:   As above, patient  presents to medical oncology for the first time on 01/24/2024 to discuss management of early stage non-small cell lung cancer.  She brings her friend Lexy with her.  She currently has no oncological complaints  Has had issues with recurrent edema of lower extremities complicated by cellulitis and a large blister in her left foot  There are no neurological symptoms and no headaches  She has intermittent left-sided chest pain for which she has been in the emergency room a few times over the last several months, undergoing CT PE is demonstrating progressive enlargement of the left lower lobe nodule.  She is oxygen dependent  There is no  unintentional weight loss    A 13 point review of systems was performed, with significant findings documented above in subjective history.    OBJECTIVE:  There were no vitals filed for this visit.     There is no height or weight on file to calculate BSA.     Wt Readings from Last 5 Encounters:   01/24/24 55.9 kg (123 lb 3.8 oz)   01/08/24 55.8 kg (123 lb)   12/28/23 54.4 kg (120 lb)   12/14/23 54.4 kg (120 lb)   12/08/23 54.4 kg (120 lb)        ECOGSCORE: 1- Restricted in physically strenuous activity.  Carries out light duty.       Diagnostic Results   Results:  Labs:  Lab Results   Component Value Date    WBC 9.3 12/28/2023    HGB 14.8 12/28/2023    HCT 43.3 12/28/2023    MCV 98 12/28/2023     12/28/2023      Lab Results   Component Value Date    NEUTROABS 7.13 (H) 12/28/2023        Lab Results   Component Value Date    GLUCOSE 83 12/28/2023    CALCIUM 8.2 (L) 12/28/2023     12/28/2023    K 4.2 12/28/2023    CO2 28 12/28/2023     12/28/2023    BUN 8 12/28/2023    CREATININE 0.64 12/28/2023    MG 2.00 12/14/2023     Lab Results   Component Value Date    ALT 14 12/28/2023    AST 18 12/28/2023    ALKPHOS 67 12/28/2023    BILITOT 0.6 12/28/2023      Lab Results   Component Value Date    TSH 2.44 10/18/2021     STUDY:  NM PET CT LUNG CA  INITIAL DIAGNOSIS;  1/11/2024  3:41 pm      INDICATION:  Signs/Symptoms:non small cell lung cancer staging.      COMPARISON:  CT angio chest on 12/28/2023      ACCESSION NUMBER(S):  [id number]      ORDERING CLINICIAN:  FABIAN ARRIAGA      TECHNIQUE:  DIVISION OF NUCLEAR MEDICINE  POSITRON EMISSION TOMOGRAPHY (PET-CT)      The patient received an intravenous dose of 13.4 mCi of Fluorine-18  fluorodeoxyglucose (FDG). Positron emission tomographic (PET) images  from mid thigh to skull base were then acquired after a one hour  delay. Also acquired was a contemporaneous low dose non-contrast CT  scan performed for attenuation correction of PET images and anatomic  localization.  The PET and CT images were digitally fused for  display.  All images were acquired on a combined PET-CT scanner unit.  Some areas of FDG accumulation may be described in standardized  uptake value (SUV) units.      CODING:  Subsequent Treatment Strategy (PS)      CALIBRATION:  Dose Injection-to-Scan Interval (mins): 62 min  Mediastinal bloodpool SUV (normal 1.5-2.5): 1.7  Blood glucose: 119 mg/dL      FINDINGS:  HEAD AND NECK:  * No evidence of focal hypermetabolic lesion in the brain parenchyma,  noting that evaluation is limited because of the expected physiologic  diffuse FDG uptake in the brain. * No evidence of paranasal sinus  disease * No enlarged or FDG avid cervical lymphadenopathy is present.  *Unremarkable thyroid glands      CHEST:  *Again seen, a spiculated 3.1 cm left lower lobe nodule is FDG avid  with SUV max of 3.4. Adjacent to it, there is a non FDG avid 3 mm  pulmonary nodule. Additionally, there is a non FDG avid irregular  pleural-based ground-glass opacity within the right lung apex *There  is minimally FDG avid 7 mm left subpectoral node (with SUV max of  1.3). Otherwise, no new enlarged or FDG avid mediastinal, hilar or  right axillary lymphadenopathy. *Unremarkable both breasts.      ABDOMEN AND PELVIS:  *No enlarged or FDG avid lymphadenopathy in the  abdomen and pelvis.  *Physiologic radiotracer uptake is present in the liver and spleen  with excretion into the bowel loops and the genitourinary tract.There  are few regions of photopenia within dome of the liver, corresponding  to fluid attenuating lesions, likely representing simple cysts *Mild  FDG avidity involving nodular thickening within the left adrenal  gland with SUV max of 2.2. Otherwise, right adrenal gland is  unremarkable.      MUSCULOSKELETAL:  *Focal FDG avidity with SUV max of 2.9 corresponding to a subacute  fracture within left anterior 2nd rib. Otherwise, no other concerning  FDG avid bone lesion throughout axial and appendicular skeleton      IMPRESSION:  1. FDG avid mass in the left lower lobe, compatible with  biopsy-proven non-small cell lung cancer. 3 mm non FDG avid pulmonary  nodule adjacent to it, indeterminate, attention on follow-up study.  2. Irregular non FDG avid pleural-based ground-glass opacity within  the right lung apex, likely nonspecific.  3. FDG avidity involving the left anterior 2nd rib, corresponding to  a subacute fracture with callus formation. Minimal FDG avid 7 mm left  subpectoral node adjacent to the fracture, favored to be reactive.  4. Mild FDG avid left adrenal thickening, similar to CT from  10/06/2021, and likely represents a benign process, such as adenoma.  5. Otherwise, no PET evidence of sarina or distant metastasis    STUDY:  CT Angiogram of the Chest; 12/28/23 at 12:50 PM  INDICATION:  Hemoptysis.  Recent lung biopsy.  History PE. LLL nodule.   COMPARISON:  Chest XR same date.  CT lung biopsy 12/21/23. CTA chest 12/14/23.  ACCESSION NUMBER(S):  [id number]  ORDERING CLINICIAN:  CHAPO MOMIN  TECHNIQUE:  CTA of the chest was performed with intravenous contrast.   Images are reviewed and processed at a workstation according to the CT  angiogram protocol with 3-D and/or MIP post processing imaging  generated.  Omnipaque 350 75 mL was administered  intravenously.  Automated mA/kV exposure control was utilized and patient examination  was performed in strict accordance with principles of ALARA.  FINDINGS:  Pulmonary arteries are adequately opacified.  The previously noted  subsegmental right upper lobe embolus is improved when compared with  the prior study with less evidence of a thrombus burden on (4-144)  sees.  No definite new pulmonary emboli are visualized at this time..   The thoracic aorta is normal in course and caliber without dissection  or aneurysm.  The heart is normal in size without pericardial effusion.  Coronary  artery calcifications are stable.  Thoracic lymph nodes are not  enlarged.  There is no pleural effusion, pleural thickening, or pneumothorax.   The airways are patent.  There are stable emphysematous changes lung fields bilaterally.  There  is a spiculated 3.1 x 1.3 cm lesion within the left lower lobe (5-193)  unchanged in size and appearance when compared with the prior exam.   There are adjacent subcentimeter nodular density is visualized (5-191)  which may represent satellite lesions versus sequelae from a recent  lung biopsy.  These are not well visualized on the prior exam.  There  are stable linear right lower lobe scarring/atelectasis..  Upper abdomen demonstrates no acute pathology.  A 2.0 cm low-density  lesion within the right hepatic lobe is again noted unchanged in size  and appearance compared to the prior study.  This may represent a  cyst.  Other subsegmental lesions are also noted likely representing  cysts which are unchanged.  Is nodular thickening of the adrenal  glands bilaterally with a nodular density on the left measuring 9 mm  in diameter and on the right 1.1 cm in diameter (4-282).  Is stable  compared to the prior exam.  There are no acute fractures.  No suspicious bony lesions.  IMPRESSION:  1.  Improved right upper lobe partially occlusive pulmonary embolus.   No new pulmonary emboli visualized on this  exam.  2.  Spiculated left lower lobe lesion most consistent with a neoplasm.   No change in size or to the prior exam.  There our adjacent nodular  densities noted.  These could represent sequelae of prior lung biopsy  versus satellite lesions.  These were not visualized on the prior exam  favoring a more acute etiology.  3.  No definite evidence of significant surrounding pulmonary  hemorrhage, pneumothorax.  4.  Nodular densities within the adrenal glands bilaterally which are  stable.  This could represent adenomas.  Cannot totally exclude  possible metastatic disease.  MRI may be helpful for further  evaluation if clinically indicated.    Assessment/Plan     No matching staging information was found for the patient.  Tentative stage IB ([id number]) NSCLC (adenocarcinoma) of the LLL -   Tumor measures 3.1 cm - 3 mm adjacent nodule is not FDG avid   Tumor has a non-classic EGFR mutation as above   No impact on choice of therapy as of now   FEV1 is 31% - she is on 3L oxygen continuously   Referral placed for Rad Onc evaluation today and she was scheduled to see Dr. Clif Wooten on 02/05/2024 for discussion of possible SBRT -   Lives in [place]   Referral for systematic mediastinal staging with IP/Advanced Diagnostic team also provided - we contacted the team today to see if they could move her procedure for later in the day so her friend can drive her.   Dr. Wooten can follow up the results of EBUS and involve me if needed for systemic therapy. If nodes are not involved, pt unlikely to benefit from systemic therapy and can be treated with RT alone.   No follow-up appointment scheduled with Med Onc at this point.       This note was created with the assistance of a speech recognition program.  Although the intention is to generate a document that actually reflects the content of the visit, it is possible that some mistakes occur and may not be corrected by the time of completion of this note.        Emily Castañeda  MD, MS  Thoracic Medical Oncology   [address] [id number]  Phone: [phone]

## 2024-02-02 PROBLEM — C34.32 PRIMARY CANCER OF LEFT LOWER LOBE OF LUNG (MULTI): Status: ACTIVE | Noted: 2024-02-02

## 2024-02-05 ENCOUNTER — TELEPHONE (OUTPATIENT)
Dept: CASE MANAGEMENT | Facility: HOSPITAL | Age: 79
End: 2024-02-05

## 2024-02-05 ENCOUNTER — HOSPITAL ENCOUNTER (OUTPATIENT)
Dept: RADIATION ONCOLOGY | Facility: CLINIC | Age: 79
Setting detail: RADIATION/ONCOLOGY SERIES
Discharge: HOME | End: 2024-02-05
Payer: COMMERCIAL

## 2024-02-05 VITALS
RESPIRATION RATE: 20 BRPM | DIASTOLIC BLOOD PRESSURE: 83 MMHG | HEART RATE: 60 BPM | WEIGHT: 121.2 LBS | BODY MASS INDEX: 21.69 KG/M2 | TEMPERATURE: 97 F | OXYGEN SATURATION: 88 % | SYSTOLIC BLOOD PRESSURE: 135 MMHG

## 2024-02-05 DIAGNOSIS — C34.32 PRIMARY CANCER OF LEFT LOWER LOBE OF LUNG (MULTI): Primary | ICD-10-CM

## 2024-02-05 PROCEDURE — 99215 OFFICE O/P EST HI 40 MIN: CPT | Performed by: STUDENT IN AN ORGANIZED HEALTH CARE EDUCATION/TRAINING PROGRAM

## 2024-02-05 PROCEDURE — 99205 OFFICE O/P NEW HI 60 MIN: CPT | Performed by: STUDENT IN AN ORGANIZED HEALTH CARE EDUCATION/TRAINING PROGRAM

## 2024-02-05 ASSESSMENT — PATIENT HEALTH QUESTIONNAIRE - PHQ9
SUM OF ALL RESPONSES TO PHQ9 QUESTIONS 1 & 2: 2
10. IF YOU CHECKED OFF ANY PROBLEMS, HOW DIFFICULT HAVE THESE PROBLEMS MADE IT FOR YOU TO DO YOUR WORK, TAKE CARE OF THINGS AT HOME, OR GET ALONG WITH OTHER PEOPLE: NOT DIFFICULT AT ALL
1. LITTLE INTEREST OR PLEASURE IN DOING THINGS: SEVERAL DAYS
2. FEELING DOWN, DEPRESSED OR HOPELESS: SEVERAL DAYS

## 2024-02-05 ASSESSMENT — COLUMBIA-SUICIDE SEVERITY RATING SCALE - C-SSRS
1. IN THE PAST MONTH, HAVE YOU WISHED YOU WERE DEAD OR WISHED YOU COULD GO TO SLEEP AND NOT WAKE UP?: NO
6. HAVE YOU EVER DONE ANYTHING, STARTED TO DO ANYTHING, OR PREPARED TO DO ANYTHING TO END YOUR LIFE?: NO
2. HAVE YOU ACTUALLY HAD ANY THOUGHTS OF KILLING YOURSELF?: NO

## 2024-02-05 ASSESSMENT — ENCOUNTER SYMPTOMS
DEPRESSION: 0
OCCASIONAL FEELINGS OF UNSTEADINESS: 0
LOSS OF SENSATION IN FEET: 0

## 2024-02-05 ASSESSMENT — PAIN SCALES - GENERAL: PAINLEVEL: 0-NO PAIN

## 2024-02-05 NOTE — TELEPHONE ENCOUNTER
Patient is under the care of Dr. Wooten.  Patient is needing a CT Sim which are done in [facility].  Patient is also having some concerns with her oxygen.  SW called patient and introduced self and role.  Patient had a very hard time understanding SW.  Patient stated that she wears hearing aids and still had a hard time hearing SW.  SW offered to have patient come into the office and meet with SW but stated that she is struggling with her oxygen tanks and cannot pull it.  Patient got emotional and SW offered support.  SENG provided the date for her CT SIM 2/21/24 Arrival 12:30pm CT SIM 1pm.  SW let patient know that SW can set up transportation with her insurance company.  Patient agreed to do this.  Patient stated that she is needing her bronchoscopy rescheduled because it is too early in the morning.  SW will call and see what SW can do to reschedule for later afternoon.  Patient receives her oxygen with [facility] and her pulmonologist ordered it.  SW will call both to see what we can do to her with her oxygen.  SW will call patient back with updated information.  Patient thanked SENG.    ELIAS Alaniz     2/6/24-SENG called [facility] regarding patient's oxygen.  SW had to leave a message.  SW had to leave a message in regards to her bronchoscopy.  SW waiting for a call back.    ELIAS Alaniz     2/7/24-SENG spoke with Patience [phone] about patient needing to do a different time for her bronchoscopy.  Patience was aware of this and will reach out to patient once she gets the time moved.  SENG was able to call patient with this update and provided Patience's phone number.  SENG was able to reorder her oxygen but still wants to get a smaller tank to use for appointments.  SENG will continue to work on for patient.  SENG provided patient with SW number if she would need anything else.  Patient thanked SENG.    Johnathan JOHNS, ELIAS

## 2024-02-05 NOTE — PROGRESS NOTES
Radiation Oncology Outpatient Consult    Patient Name:  Abigail Aquino  MRN:  [id number]  :  1945    Referring Provider: No ref. provider found  Primary Care Provider: Vikki Marquez MD  Care Team: Patient Care Team:  Vikki Marquez MD as PCP - General  Emily Castañeda MD as Medical Oncologist (Hematology and Oncology)    Date of Service: 2024     SUBJECTIVE  History of Present Illness:  Abigail Aquino is a 78 y.o. female who was referred by No ref. provider found, for a consultation to the [facility] Department of Radiation Oncology.  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     The patient has a history of COPD (on 3L NC oxygen at baseline, FEV1 31%). She was diagnosed with PE after a fall with a broken leg in 10/2021. CTA chest at that time showed a 1.4 cm nodular opacity in the left lower lobe.    CTA chest on 23 showed the left lower lobe nodule had grown to 2.7 cm with no associated adenopathy.    On 23 she presented to the ED with chest pain, and CTA chest showed the left lower lobe mass measuring 3.2 cm. There were subtle right upper lobe subsegmental filling defects in the pulmonary artery consistent with small volume PE.    She underwent CT-guided biopsy of the LLL mass on 23, and pathology showed adenocarcinoma, PDL1 TPS <1%, EGFR G719S mutant.    PET/CT on 24 showed avidity in the LLL mass (max SUV 3.4). There was mild uptake in the left anterior second rib (max SUV 2.9) consistent with subacute fracture. There was also mild uptake in the left adrenal gland which appeared similar to CT from 10/2021, consistent with adenoma. There was no hypermetabolic mediastinal or hilar adenopathy noted.    She met with Dr. Castañeda, who discussed definitive SBRT assuming no sarina involvement. Bronchoscopy/EBUS is scheduled for 24.    Today, she presents by herself  to discuss lung radiation. She complains of severe dyspnea on exertion with her 3L NC oxygen at baseline. She lives alone is able to do most ADLs but takes a long time because she needs breaks. She pays her neighbor to help her with some chores. She has a cough but feels there is mucus stuck in her throat. She denies chest pain or hemoptysis.    PMH: COPD (on 3L NC oxygen), CHF, PE (on Eliquis), anxiety  PSH: appendectomy, carpal tunnel surgery, tonsillectomy, left ankle surgery  FMH: daughter with cervical cancer  SHx: Lives alone in [place], [place]. Used to work as  and . Former smoker with at least 60 pack-year history, quit in 2023.    Prior Radiotherapy:  No  Radiation Treatments       No radiation treatments to show. (Treatments may have been administered in another system.)          Current Systemic Treatment:  No     Presence of Pacemaker or ICD:  No    Past Medical History:    Past Medical History:   Diagnosis Date    Clotting disorder (CMS/HCC)     COPD (chronic obstructive pulmonary disease) (CMS/HCC)     Eyebrow laceration, right, sequela 10/04/2023    Fracture tibia/fibula, right, sequela 10/11/2021    Hypertension     Kidney stones 2017        Past Surgical History:    Past Surgical History:   Procedure Laterality Date    APPENDECTOMY      CT GUIDED PERCUTANEOUS BIOPSY LUNG  2023    CT GUIDED PERCUTANEOUS BIOPSY LUNG 2023 POR CT    TONSILLECTOMY          Family History:  Cancer-related family history includes Cancer in her daughter.    Social History:    Social History     Tobacco Use    Smoking status: Former     Types: Cigarettes     Quit date: 2023     Years since quittin.4     Passive exposure: Past    Smokeless tobacco: Never   Vaping Use    Vaping Use: Never used   Substance Use Topics    Alcohol use: Not Currently    Drug use: Never       Allergies:    Allergies   Allergen Reactions    Atorvastatin Myalgia    Oxycodone  Nausea/vomiting        Medications:    Current Outpatient Medications:     albuterol 90 mcg/actuation inhaler, INHALE 2 PUFFS BY MOUTH FOUR TIMES A DAY AS NEEDED FOR SHORTNESS OF BREATH, Disp: 18 g, Rfl: 1    apixaban (Eliquis) 5 mg (74 tabs) tablet, Take 2 tablets (10 mg) by mouth 2 times a day for 7 days, then take 1 tablet (5 mg) by mouth 2 times a day., Disp: 74 tablet, Rfl: 0    budesonide-glycopyr-formoterol (Breztri Aerosphere) 160-9-4.8 mcg/actuation HFA aerosol inhaler, Inhale 2 puffs 2 times a day., Disp: 24 g, Rfl: 11    furosemide (Lasix) 20 mg tablet, TAKE 1 TABLET BY MOUTH ONCE DAILY, Disp: 90 tablet, Rfl: 0    hydrOXYzine HCL (Atarax) 25 mg tablet, Take 1 tablet a night before your PET scan and another tablet on the morning of your PET scan., Disp: 2 tablet, Rfl: 2    ipratropium-albuteroL (Duo-Neb) 0.5-2.5 mg/3 mL nebulizer solution, Take 3 mL by nebulization 4 times a day as needed for wheezing or shortness of breath., Disp: 360 mL, Rfl: 11    isosorbide mononitrate ER (Imdur) 30 mg 24 hr tablet, Take 1 tablet (30 mg) by mouth once daily., Disp: , Rfl:     metoprolol tartrate (Lopressor) 50 mg tablet, Take 1 tablet by mouth 2 times a day., Disp: , Rfl:     pravastatin (Pravachol) 40 mg tablet, Take 1 tablet (40 mg) by mouth once daily., Disp: , Rfl:       Review of Systems:  Review of Systems   All other systems reviewed and are negative.      Performance Status:  The Karnofsky performance scale today is 60, Requires occasional assistance, but is able to care for most of her personal needs (ECOG equivalent 2).        OBJECTIVE  Physical Exam:  /83 (BP Location: Left arm, Patient Position: Sitting, BP Cuff Size: Child)   Pulse 60   Temp 36.1 °C (97 °F) (Temporal)   Resp 20   Wt 55 kg (121 lb 3.2 oz)   SpO2 (!) 88%   BMI 21.69 kg/m²    Physical Exam  Vitals reviewed.   Constitutional:       General: She is not in acute distress.     Appearance: Normal appearance.   HENT:      Head:  Normocephalic and atraumatic.      Mouth/Throat:      Mouth: Mucous membranes are moist.      Pharynx: Oropharynx is clear. No oropharyngeal exudate or posterior oropharyngeal erythema.   Eyes:      General: No scleral icterus.     Extraocular Movements: Extraocular movements intact.      Conjunctiva/sclera: Conjunctivae normal.      Pupils: Pupils are equal, round, and reactive to light.   Cardiovascular:      Rate and Rhythm: Normal rate and regular rhythm.      Heart sounds: No murmur heard.     No friction rub. No gallop.   Pulmonary:      Effort: Pulmonary effort is normal. No respiratory distress.      Breath sounds: No stridor. No wheezing, rhonchi or rales.      Comments: Distant breath sounds bilaterally. No distress on 3L NC oxygen at rest.  Chest:      Chest wall: No tenderness.   Musculoskeletal:         General: Normal range of motion.      Cervical back: Normal range of motion and neck supple.      Right lower leg: No edema.      Left lower leg: No edema.   Skin:     General: Skin is warm and dry.      Coloration: Skin is not jaundiced.      Findings: No lesion or rash.   Neurological:      General: No focal deficit present.      Mental Status: She is alert and oriented to person, place, and time.      Cranial Nerves: No cranial nerve deficit.      Sensory: No sensory deficit.      Motor: No weakness.      Coordination: Coordination normal.      Gait: Gait normal.   Psychiatric:         Mood and Affect: Mood normal.         Behavior: Behavior normal.          Laboratory Review:  There are no laboratory contraindications to radiation therapy.    The pertinent lab results were reviewed and discussed with the patient.    Pathology Review:  The pertinent pathology results were reviewed and discussed with the patient.  Notably, her CT-guided lung biopsy on 12/21/23.     Imaging:  The pertinent imaging results were reviewed and discussed with the patient.  Notably, her PET/CT on 1/11/24.        ASSESSMENT:  Abigail Aquino is a 78 y.o. 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 She is medically inoperable with severe COPD (3L NC oxygen at baseline, FEV1 31%). She has fair performance status (ECOG 2) but lives independently.    She is scheduled for staging bronchoscopy/EBUS on 2/26/24. Assuming she has pN0 disease, I recommend definitive left lung SBRT in 3-5 fractions. I discussed the rationale, benefits, risks, and process of lung radiation therapy with the patient. I discussed the potential side effects, including but not limited to fatigue, dermatitis, pneumonitis, lung fibrosis, chest wall pain/discomfort, secondary malignancy, etc. The patient expressed understanding and gave informed consent.     We will schedule her for CT simulation at [facility], likely the week of 2/19/24. She asked about options for transportation and home health care; social work is aware and will contact her.    NCCN Guidelines were applicable to guide this patients treatment plan.     Clif Wooten MD

## 2024-02-06 ENCOUNTER — TELEPHONE (OUTPATIENT)
Dept: HEMATOLOGY/ONCOLOGY | Facility: CLINIC | Age: 79
End: 2024-02-06
Payer: COMMERCIAL

## 2024-02-09 ENCOUNTER — APPOINTMENT (OUTPATIENT)
Dept: RADIOLOGY | Facility: HOSPITAL | Age: 79
DRG: 190 | End: 2024-02-09
Payer: COMMERCIAL

## 2024-02-09 ENCOUNTER — HOSPITAL ENCOUNTER (INPATIENT)
Facility: HOSPITAL | Age: 79
LOS: 4 days | Discharge: HOME | DRG: 190 | End: 2024-02-14
Attending: EMERGENCY MEDICINE | Admitting: INTERNAL MEDICINE
Payer: COMMERCIAL

## 2024-02-09 ENCOUNTER — APPOINTMENT (OUTPATIENT)
Dept: CARDIOLOGY | Facility: HOSPITAL | Age: 79
DRG: 190 | End: 2024-02-09
Payer: COMMERCIAL

## 2024-02-09 DIAGNOSIS — J44.1 COPD EXACERBATION (MULTI): Primary | ICD-10-CM

## 2024-02-09 DIAGNOSIS — J43.9 PULMONARY EMPHYSEMA, UNSPECIFIED EMPHYSEMA TYPE (MULTI): ICD-10-CM

## 2024-02-09 LAB
ALBUMIN SERPL BCP-MCNC: 3.9 G/DL (ref 3.4–5)
ALP SERPL-CCNC: 55 U/L (ref 33–136)
ALT SERPL W P-5'-P-CCNC: 11 U/L (ref 7–45)
ANION GAP BLDV CALCULATED.4IONS-SCNC: 4 MMOL/L (ref 10–25)
ANION GAP SERPL CALC-SCNC: 10 MMOL/L (ref 10–20)
AST SERPL W P-5'-P-CCNC: 16 U/L (ref 9–39)
BASE EXCESS BLDV CALC-SCNC: 12 MMOL/L (ref -2–3)
BASOPHILS # BLD AUTO: 0.07 X10*3/UL (ref 0–0.1)
BASOPHILS NFR BLD AUTO: 0.8 %
BILIRUB DIRECT SERPL-MCNC: 0.1 MG/DL (ref 0–0.3)
BILIRUB SERPL-MCNC: 0.5 MG/DL (ref 0–1.2)
BNP SERPL-MCNC: 249 PG/ML (ref 0–99)
BODY TEMPERATURE: 37 DEGREES CELSIUS
BUN SERPL-MCNC: 7 MG/DL (ref 6–23)
CA-I BLDV-SCNC: 1.13 MMOL/L (ref 1.1–1.33)
CALCIUM SERPL-MCNC: 8.7 MG/DL (ref 8.6–10.3)
CARDIAC TROPONIN I PNL SERPL HS: 7 NG/L (ref 0–13)
CHLORIDE BLDV-SCNC: 99 MMOL/L (ref 98–107)
CHLORIDE SERPL-SCNC: 100 MMOL/L (ref 98–107)
CO2 SERPL-SCNC: 36 MMOL/L (ref 21–32)
CREAT SERPL-MCNC: 0.57 MG/DL (ref 0.5–1.05)
EGFRCR SERPLBLD CKD-EPI 2021: >90 ML/MIN/1.73M*2
EOSINOPHIL # BLD AUTO: 0.5 X10*3/UL (ref 0–0.4)
EOSINOPHIL NFR BLD AUTO: 5.5 %
ERYTHROCYTE [DISTWIDTH] IN BLOOD BY AUTOMATED COUNT: 12.5 % (ref 11.5–14.5)
FLUAV RNA RESP QL NAA+PROBE: NOT DETECTED
FLUBV RNA RESP QL NAA+PROBE: NOT DETECTED
GLUCOSE BLDV-MCNC: 169 MG/DL (ref 74–99)
GLUCOSE SERPL-MCNC: 153 MG/DL (ref 74–99)
HCO3 BLDV-SCNC: 40.8 MMOL/L (ref 22–26)
HCT VFR BLD AUTO: 46.7 % (ref 36–46)
HCT VFR BLD EST: 47 % (ref 36–46)
HGB BLD-MCNC: 15.6 G/DL (ref 12–16)
HGB BLDV-MCNC: 15.7 G/DL (ref 12–16)
IMM GRANULOCYTES # BLD AUTO: 0.03 X10*3/UL (ref 0–0.5)
IMM GRANULOCYTES NFR BLD AUTO: 0.3 % (ref 0–0.9)
INHALED O2 CONCENTRATION: 33 %
LACTATE BLDV-SCNC: 1.1 MMOL/L (ref 0.4–2)
LYMPHOCYTES # BLD AUTO: 1.49 X10*3/UL (ref 0.8–3)
LYMPHOCYTES NFR BLD AUTO: 16.3 %
MCH RBC QN AUTO: 32.8 PG (ref 26–34)
MCHC RBC AUTO-ENTMCNC: 33.4 G/DL (ref 32–36)
MCV RBC AUTO: 98 FL (ref 80–100)
MONOCYTES # BLD AUTO: 0.72 X10*3/UL (ref 0.05–0.8)
MONOCYTES NFR BLD AUTO: 7.9 %
NEUTROPHILS # BLD AUTO: 6.31 X10*3/UL (ref 1.6–5.5)
NEUTROPHILS NFR BLD AUTO: 69.2 %
NRBC BLD-RTO: 0 /100 WBCS (ref 0–0)
OXYHGB MFR BLDV: 85.4 % (ref 45–75)
PCO2 BLDV: 69 MM HG (ref 41–51)
PH BLDV: 7.38 PH (ref 7.33–7.43)
PLATELET # BLD AUTO: 189 X10*3/UL (ref 150–450)
PO2 BLDV: 55 MM HG (ref 35–45)
POTASSIUM BLDV-SCNC: 3.6 MMOL/L (ref 3.5–5.3)
POTASSIUM SERPL-SCNC: 3.5 MMOL/L (ref 3.5–5.3)
PROT SERPL-MCNC: 6.4 G/DL (ref 6.4–8.2)
RBC # BLD AUTO: 4.75 X10*6/UL (ref 4–5.2)
RBC MORPH BLD: NORMAL
RSV RNA RESP QL NAA+PROBE: NOT DETECTED
SAO2 % BLDV: 89 % (ref 45–75)
SARS-COV-2 RNA RESP QL NAA+PROBE: NOT DETECTED
SODIUM BLDV-SCNC: 140 MMOL/L (ref 136–145)
SODIUM SERPL-SCNC: 142 MMOL/L (ref 136–145)
WBC # BLD AUTO: 9.1 X10*3/UL (ref 4.4–11.3)

## 2024-02-09 PROCEDURE — 99285 EMERGENCY DEPT VISIT HI MDM: CPT | Mod: 25 | Performed by: EMERGENCY MEDICINE

## 2024-02-09 PROCEDURE — 84484 ASSAY OF TROPONIN QUANT: CPT | Performed by: EMERGENCY MEDICINE

## 2024-02-09 PROCEDURE — 94640 AIRWAY INHALATION TREATMENT: CPT

## 2024-02-09 PROCEDURE — 2500000004 HC RX 250 GENERAL PHARMACY W/ HCPCS (ALT 636 FOR OP/ED): Performed by: EMERGENCY MEDICINE

## 2024-02-09 PROCEDURE — 84132 ASSAY OF SERUM POTASSIUM: CPT | Performed by: EMERGENCY MEDICINE

## 2024-02-09 PROCEDURE — 83880 ASSAY OF NATRIURETIC PEPTIDE: CPT | Performed by: EMERGENCY MEDICINE

## 2024-02-09 PROCEDURE — 36415 COLL VENOUS BLD VENIPUNCTURE: CPT | Performed by: EMERGENCY MEDICINE

## 2024-02-09 PROCEDURE — 2500000002 HC RX 250 W HCPCS SELF ADMINISTERED DRUGS (ALT 637 FOR MEDICARE OP, ALT 636 FOR OP/ED): Performed by: EMERGENCY MEDICINE

## 2024-02-09 PROCEDURE — 93005 ELECTROCARDIOGRAM TRACING: CPT

## 2024-02-09 PROCEDURE — 87636 SARSCOV2 & INF A&B AMP PRB: CPT | Performed by: EMERGENCY MEDICINE

## 2024-02-09 PROCEDURE — 71045 X-RAY EXAM CHEST 1 VIEW: CPT

## 2024-02-09 PROCEDURE — 82248 BILIRUBIN DIRECT: CPT | Performed by: EMERGENCY MEDICINE

## 2024-02-09 PROCEDURE — 96374 THER/PROPH/DIAG INJ IV PUSH: CPT

## 2024-02-09 PROCEDURE — 85025 COMPLETE CBC W/AUTO DIFF WBC: CPT | Performed by: EMERGENCY MEDICINE

## 2024-02-09 PROCEDURE — 87634 RSV DNA/RNA AMP PROBE: CPT | Performed by: EMERGENCY MEDICINE

## 2024-02-09 PROCEDURE — 99223 1ST HOSP IP/OBS HIGH 75: CPT | Performed by: INTERNAL MEDICINE

## 2024-02-09 PROCEDURE — 71045 X-RAY EXAM CHEST 1 VIEW: CPT | Mod: FOREIGN READ | Performed by: RADIOLOGY

## 2024-02-09 RX ORDER — IPRATROPIUM BROMIDE AND ALBUTEROL SULFATE 2.5; .5 MG/3ML; MG/3ML
3 SOLUTION RESPIRATORY (INHALATION) ONCE
Status: COMPLETED | OUTPATIENT
Start: 2024-02-09 | End: 2024-02-09

## 2024-02-09 RX ORDER — ALBUTEROL SULFATE 0.83 MG/ML
3 SOLUTION RESPIRATORY (INHALATION)
Status: DISCONTINUED | OUTPATIENT
Start: 2024-02-09 | End: 2024-02-14 | Stop reason: HOSPADM

## 2024-02-09 RX ADMIN — ALBUTEROL SULFATE 3 ML: 2.5 SOLUTION RESPIRATORY (INHALATION) at 22:26

## 2024-02-09 RX ADMIN — IPRATROPIUM BROMIDE AND ALBUTEROL SULFATE 3 ML: 2.5; .5 SOLUTION RESPIRATORY (INHALATION) at 17:11

## 2024-02-09 RX ADMIN — METHYLPREDNISOLONE SODIUM SUCCINATE 125 MG: 125 INJECTION, POWDER, FOR SOLUTION INTRAMUSCULAR; INTRAVENOUS at 17:12

## 2024-02-09 ASSESSMENT — LIFESTYLE VARIABLES
EVER FELT BAD OR GUILTY ABOUT YOUR DRINKING: NO
HAVE PEOPLE ANNOYED YOU BY CRITICIZING YOUR DRINKING: NO
HAVE YOU EVER FELT YOU SHOULD CUT DOWN ON YOUR DRINKING: NO
EVER HAD A DRINK FIRST THING IN THE MORNING TO STEADY YOUR NERVES TO GET RID OF A HANGOVER: NO

## 2024-02-09 ASSESSMENT — PAIN SCALES - GENERAL: PAINLEVEL_OUTOF10: 0 - NO PAIN

## 2024-02-09 ASSESSMENT — PAIN - FUNCTIONAL ASSESSMENT: PAIN_FUNCTIONAL_ASSESSMENT: 0-10

## 2024-02-10 LAB
ANION GAP SERPL CALC-SCNC: 9 MMOL/L (ref 10–20)
BASE EXCESS BLDV CALC-SCNC: -0.5 MMOL/L (ref -2–3)
BASOPHILS # BLD AUTO: 0.01 X10*3/UL (ref 0–0.1)
BASOPHILS NFR BLD AUTO: 0.2 %
BODY TEMPERATURE: 37 DEGREES CELSIUS
BUN SERPL-MCNC: 12 MG/DL (ref 6–23)
CALCIUM SERPL-MCNC: 8.9 MG/DL (ref 8.6–10.3)
CHLORIDE SERPL-SCNC: 101 MMOL/L (ref 98–107)
CO2 SERPL-SCNC: 37 MMOL/L (ref 21–32)
CREAT SERPL-MCNC: 0.6 MG/DL (ref 0.5–1.05)
EGFRCR SERPLBLD CKD-EPI 2021: >90 ML/MIN/1.73M*2
EOSINOPHIL # BLD AUTO: 0 X10*3/UL (ref 0–0.4)
EOSINOPHIL NFR BLD AUTO: 0 %
ERYTHROCYTE [DISTWIDTH] IN BLOOD BY AUTOMATED COUNT: 12.5 % (ref 11.5–14.5)
GLUCOSE SERPL-MCNC: 129 MG/DL (ref 74–99)
HCO3 BLDV-SCNC: 26.3 MMOL/L (ref 22–26)
HCT VFR BLD AUTO: 43.6 % (ref 36–46)
HGB BLD-MCNC: 14.4 G/DL (ref 12–16)
IMM GRANULOCYTES # BLD AUTO: 0.02 X10*3/UL (ref 0–0.5)
IMM GRANULOCYTES NFR BLD AUTO: 0.3 % (ref 0–0.9)
INHALED O2 CONCENTRATION: 40 %
LYMPHOCYTES # BLD AUTO: 0.65 X10*3/UL (ref 0.8–3)
LYMPHOCYTES NFR BLD AUTO: 10.7 %
MCH RBC QN AUTO: 32.8 PG (ref 26–34)
MCHC RBC AUTO-ENTMCNC: 33 G/DL (ref 32–36)
MCV RBC AUTO: 99 FL (ref 80–100)
MONOCYTES # BLD AUTO: 0.08 X10*3/UL (ref 0.05–0.8)
MONOCYTES NFR BLD AUTO: 1.3 %
NEUTROPHILS # BLD AUTO: 5.34 X10*3/UL (ref 1.6–5.5)
NEUTROPHILS NFR BLD AUTO: 87.5 %
NRBC BLD-RTO: 0 /100 WBCS (ref 0–0)
OXYHGB MFR BLDV: 88.8 % (ref 45–75)
PCO2 BLDV: 51 MM HG (ref 41–51)
PH BLDV: 7.32 PH (ref 7.33–7.43)
PLATELET # BLD AUTO: 138 X10*3/UL (ref 150–450)
PO2 BLDV: 60 MM HG (ref 35–45)
POTASSIUM SERPL-SCNC: 4 MMOL/L (ref 3.5–5.3)
RBC # BLD AUTO: 4.39 X10*6/UL (ref 4–5.2)
SAO2 % BLDV: 92 % (ref 45–75)
SODIUM SERPL-SCNC: 143 MMOL/L (ref 136–145)
WBC # BLD AUTO: 6.1 X10*3/UL (ref 4.4–11.3)

## 2024-02-10 PROCEDURE — 82805 BLOOD GASES W/O2 SATURATION: CPT | Performed by: INTERNAL MEDICINE

## 2024-02-10 PROCEDURE — 1100000001 HC PRIVATE ROOM DAILY

## 2024-02-10 PROCEDURE — 2500000002 HC RX 250 W HCPCS SELF ADMINISTERED DRUGS (ALT 637 FOR MEDICARE OP, ALT 636 FOR OP/ED): Performed by: INTERNAL MEDICINE

## 2024-02-10 PROCEDURE — 99233 SBSQ HOSP IP/OBS HIGH 50: CPT | Performed by: INTERNAL MEDICINE

## 2024-02-10 PROCEDURE — 94668 MNPJ CHEST WALL SBSQ: CPT

## 2024-02-10 PROCEDURE — 83036 HEMOGLOBIN GLYCOSYLATED A1C: CPT | Performed by: INTERNAL MEDICINE

## 2024-02-10 PROCEDURE — 2500000001 HC RX 250 WO HCPCS SELF ADMINISTERED DRUGS (ALT 637 FOR MEDICARE OP): Performed by: INTERNAL MEDICINE

## 2024-02-10 PROCEDURE — 2500000004 HC RX 250 GENERAL PHARMACY W/ HCPCS (ALT 636 FOR OP/ED): Performed by: INTERNAL MEDICINE

## 2024-02-10 PROCEDURE — 85025 COMPLETE CBC W/AUTO DIFF WBC: CPT | Performed by: INTERNAL MEDICINE

## 2024-02-10 PROCEDURE — 80048 BASIC METABOLIC PNL TOTAL CA: CPT | Performed by: INTERNAL MEDICINE

## 2024-02-10 PROCEDURE — 36415 COLL VENOUS BLD VENIPUNCTURE: CPT | Performed by: INTERNAL MEDICINE

## 2024-02-10 RX ORDER — DOXYCYCLINE 100 MG/1
100 CAPSULE ORAL 2 TIMES DAILY
Status: DISCONTINUED | OUTPATIENT
Start: 2024-02-10 | End: 2024-02-14 | Stop reason: HOSPADM

## 2024-02-10 RX ORDER — METOPROLOL TARTRATE 50 MG/1
50 TABLET ORAL 2 TIMES DAILY
Status: DISCONTINUED | OUTPATIENT
Start: 2024-02-10 | End: 2024-02-14 | Stop reason: HOSPADM

## 2024-02-10 RX ORDER — ISOSORBIDE MONONITRATE 30 MG/1
30 TABLET, EXTENDED RELEASE ORAL DAILY
Status: DISCONTINUED | OUTPATIENT
Start: 2024-02-10 | End: 2024-02-14 | Stop reason: HOSPADM

## 2024-02-10 RX ORDER — ACETAMINOPHEN 325 MG/1
650 TABLET ORAL EVERY 4 HOURS PRN
Status: DISCONTINUED | OUTPATIENT
Start: 2024-02-10 | End: 2024-02-14

## 2024-02-10 RX ORDER — PRAVASTATIN SODIUM 40 MG/1
40 TABLET ORAL NIGHTLY
Status: DISCONTINUED | OUTPATIENT
Start: 2024-02-10 | End: 2024-02-14 | Stop reason: HOSPADM

## 2024-02-10 RX ORDER — ACETAMINOPHEN 325 MG/1
650 TABLET ORAL EVERY 4 HOURS PRN
Status: DISCONTINUED | OUTPATIENT
Start: 2024-02-10 | End: 2024-02-14 | Stop reason: HOSPADM

## 2024-02-10 RX ORDER — BUDESONIDE 3 MG/1
3 CAPSULE, COATED PELLETS ORAL EVERY MORNING
COMMUNITY
End: 2024-05-09 | Stop reason: HOSPADM

## 2024-02-10 RX ORDER — FUROSEMIDE 20 MG/1
20 TABLET ORAL DAILY
Status: DISCONTINUED | OUTPATIENT
Start: 2024-02-10 | End: 2024-02-14 | Stop reason: HOSPADM

## 2024-02-10 RX ORDER — FLUTICASONE FUROATE AND VILANTEROL 200; 25 UG/1; UG/1
1 POWDER RESPIRATORY (INHALATION) DAILY
Status: DISCONTINUED | OUTPATIENT
Start: 2024-02-10 | End: 2024-02-14 | Stop reason: HOSPADM

## 2024-02-10 RX ORDER — GUAIFENESIN 600 MG/1
600 TABLET, EXTENDED RELEASE ORAL 2 TIMES DAILY PRN
Status: DISCONTINUED | OUTPATIENT
Start: 2024-02-10 | End: 2024-02-14 | Stop reason: HOSPADM

## 2024-02-10 RX ORDER — IPRATROPIUM BROMIDE AND ALBUTEROL SULFATE 2.5; .5 MG/3ML; MG/3ML
3 SOLUTION RESPIRATORY (INHALATION) 4 TIMES DAILY PRN
Status: DISCONTINUED | OUTPATIENT
Start: 2024-02-10 | End: 2024-02-14 | Stop reason: HOSPADM

## 2024-02-10 RX ORDER — ONDANSETRON HYDROCHLORIDE 2 MG/ML
4 INJECTION, SOLUTION INTRAVENOUS EVERY 8 HOURS PRN
Status: DISCONTINUED | OUTPATIENT
Start: 2024-02-10 | End: 2024-02-14 | Stop reason: HOSPADM

## 2024-02-10 RX ADMIN — FUROSEMIDE 20 MG: 20 TABLET ORAL at 08:33

## 2024-02-10 RX ADMIN — FLUTICASONE FUROATE AND VILANTEROL TRIFENATATE 1 PUFF: 200; 25 POWDER RESPIRATORY (INHALATION) at 08:33

## 2024-02-10 RX ADMIN — PRAVASTATIN SODIUM 40 MG: 40 TABLET ORAL at 20:20

## 2024-02-10 RX ADMIN — TIOTROPIUM BROMIDE INHALATION SPRAY 2 PUFF: 3.12 SPRAY, METERED RESPIRATORY (INHALATION) at 08:33

## 2024-02-10 RX ADMIN — METOPROLOL TARTRATE 50 MG: 50 TABLET, FILM COATED ORAL at 20:20

## 2024-02-10 RX ADMIN — DOXYCYCLINE 100 MG: 100 CAPSULE ORAL at 20:20

## 2024-02-10 RX ADMIN — APIXABAN 5 MG: 5 TABLET, FILM COATED ORAL at 08:33

## 2024-02-10 RX ADMIN — METHYLPREDNISOLONE SODIUM SUCCINATE 40 MG: 40 INJECTION, POWDER, FOR SOLUTION INTRAMUSCULAR; INTRAVENOUS at 17:51

## 2024-02-10 RX ADMIN — DOXYCYCLINE 100 MG: 100 CAPSULE ORAL at 08:33

## 2024-02-10 RX ADMIN — METHYLPREDNISOLONE SODIUM SUCCINATE 40 MG: 40 INJECTION, POWDER, FOR SOLUTION INTRAMUSCULAR; INTRAVENOUS at 05:51

## 2024-02-10 RX ADMIN — GUAIFENESIN 600 MG: 600 TABLET ORAL at 23:42

## 2024-02-10 RX ADMIN — ISOSORBIDE MONONITRATE 30 MG: 30 TABLET, EXTENDED RELEASE ORAL at 08:33

## 2024-02-10 RX ADMIN — APIXABAN 5 MG: 5 TABLET, FILM COATED ORAL at 20:20

## 2024-02-10 RX ADMIN — METOPROLOL TARTRATE 50 MG: 50 TABLET, FILM COATED ORAL at 08:33

## 2024-02-10 SDOH — SOCIAL STABILITY: SOCIAL INSECURITY: DO YOU FEEL ANYONE HAS EXPLOITED OR TAKEN ADVANTAGE OF YOU FINANCIALLY OR OF YOUR PERSONAL PROPERTY?: NO

## 2024-02-10 SDOH — SOCIAL STABILITY: SOCIAL INSECURITY: DO YOU FEEL UNSAFE GOING BACK TO THE PLACE WHERE YOU ARE LIVING?: NO

## 2024-02-10 SDOH — SOCIAL STABILITY: SOCIAL INSECURITY: HAS ANYONE EVER THREATENED TO HURT YOUR FAMILY OR YOUR PETS?: NO

## 2024-02-10 SDOH — SOCIAL STABILITY: SOCIAL INSECURITY: DOES ANYONE TRY TO KEEP YOU FROM HAVING/CONTACTING OTHER FRIENDS OR DOING THINGS OUTSIDE YOUR HOME?: NO

## 2024-02-10 SDOH — SOCIAL STABILITY: SOCIAL INSECURITY: ARE YOU OR HAVE YOU BEEN THREATENED OR ABUSED PHYSICALLY, EMOTIONALLY, OR SEXUALLY BY ANYONE?: NO

## 2024-02-10 SDOH — SOCIAL STABILITY: SOCIAL INSECURITY: ABUSE: ADULT

## 2024-02-10 SDOH — SOCIAL STABILITY: SOCIAL INSECURITY: HAVE YOU HAD THOUGHTS OF HARMING ANYONE ELSE?: NO

## 2024-02-10 SDOH — SOCIAL STABILITY: SOCIAL INSECURITY: ARE THERE ANY APPARENT SIGNS OF INJURIES/BEHAVIORS THAT COULD BE RELATED TO ABUSE/NEGLECT?: NO

## 2024-02-10 ASSESSMENT — COGNITIVE AND FUNCTIONAL STATUS - GENERAL
DAILY ACTIVITIY SCORE: 22
MOVING TO AND FROM BED TO CHAIR: A LITTLE
MOBILITY SCORE: 20
HELP NEEDED FOR BATHING: A LITTLE
MOVING TO AND FROM BED TO CHAIR: A LITTLE
DAILY ACTIVITIY SCORE: 22
MOVING TO AND FROM BED TO CHAIR: A LITTLE
TOILETING: A LITTLE
DRESSING REGULAR LOWER BODY CLOTHING: A LITTLE
DRESSING REGULAR LOWER BODY CLOTHING: A LITTLE
CLIMB 3 TO 5 STEPS WITH RAILING: A LITTLE
WALKING IN HOSPITAL ROOM: A LITTLE
CLIMB 3 TO 5 STEPS WITH RAILING: A LITTLE
HELP NEEDED FOR BATHING: A LITTLE
STANDING UP FROM CHAIR USING ARMS: A LITTLE
MOBILITY SCORE: 20
DAILY ACTIVITIY SCORE: 21
STANDING UP FROM CHAIR USING ARMS: A LITTLE
WALKING IN HOSPITAL ROOM: A LITTLE
STANDING UP FROM CHAIR USING ARMS: A LITTLE
WALKING IN HOSPITAL ROOM: A LITTLE
MOBILITY SCORE: 20
HELP NEEDED FOR BATHING: A LITTLE
CLIMB 3 TO 5 STEPS WITH RAILING: A LITTLE
DRESSING REGULAR LOWER BODY CLOTHING: A LITTLE
PATIENT BASELINE BEDBOUND: NO

## 2024-02-10 ASSESSMENT — LIFESTYLE VARIABLES
HOW OFTEN DO YOU HAVE 6 OR MORE DRINKS ON ONE OCCASION: NEVER
SKIP TO QUESTIONS 9-10: 1
HOW OFTEN DO YOU HAVE A DRINK CONTAINING ALCOHOL: NEVER
HOW MANY STANDARD DRINKS CONTAINING ALCOHOL DO YOU HAVE ON A TYPICAL DAY: PATIENT DOES NOT DRINK
AUDIT-C TOTAL SCORE: 0
AUDIT-C TOTAL SCORE: 0

## 2024-02-10 ASSESSMENT — ACTIVITIES OF DAILY LIVING (ADL)
ASSISTIVE_DEVICE: HEARING AID - RIGHT
LACK_OF_TRANSPORTATION: NO
PATIENT'S MEMORY ADEQUATE TO SAFELY COMPLETE DAILY ACTIVITIES?: YES
BATHING: INDEPENDENT
HEARING - RIGHT EAR: HEARING AID
ADEQUATE_TO_COMPLETE_ADL: YES
TOILETING: NEEDS ASSISTANCE
GROOMING: INDEPENDENT
WALKS IN HOME: NEEDS ASSISTANCE
JUDGMENT_ADEQUATE_SAFELY_COMPLETE_DAILY_ACTIVITIES: YES
HEARING - LEFT EAR: HEARING AID
DRESSING YOURSELF: INDEPENDENT
LACK_OF_TRANSPORTATION: NO
FEEDING YOURSELF: INDEPENDENT

## 2024-02-10 ASSESSMENT — PAIN - FUNCTIONAL ASSESSMENT
PAIN_FUNCTIONAL_ASSESSMENT: 0-10
PAIN_FUNCTIONAL_ASSESSMENT: 0-10

## 2024-02-10 ASSESSMENT — ENCOUNTER SYMPTOMS
FEVER: 0
ABDOMINAL PAIN: 0
BLOOD IN STOOL: 0
WEAKNESS: 0
DYSURIA: 0
VOMITING: 0
PALPITATIONS: 0
CHILLS: 0
NUMBNESS: 0
SORE THROAT: 0
FATIGUE: 0
SPEECH DIFFICULTY: 0
COUGH: 1
WHEEZING: 1
BACK PAIN: 0
DIZZINESS: 0
SHORTNESS OF BREATH: 1
FLANK PAIN: 0
CONFUSION: 0
POLYDIPSIA: 0
EYES NEGATIVE: 1
SEIZURES: 0
CHEST TIGHTNESS: 0
NECK PAIN: 0
NAUSEA: 0

## 2024-02-10 ASSESSMENT — PAIN SCALES - GENERAL
PAINLEVEL_OUTOF10: 0 - NO PAIN

## 2024-02-10 ASSESSMENT — PATIENT HEALTH QUESTIONNAIRE - PHQ9
1. LITTLE INTEREST OR PLEASURE IN DOING THINGS: NOT AT ALL
2. FEELING DOWN, DEPRESSED OR HOPELESS: NOT AT ALL
SUM OF ALL RESPONSES TO PHQ9 QUESTIONS 1 & 2: 0

## 2024-02-10 NOTE — PROGRESS NOTES
Abigail Aquino is a 78 y.o. female on day 0 of admission presenting with COPD exacerbation (CMS/[facility]).      Subjective   Abigail Aquino is a 78 y.o. 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  Patient states that she has pulse oximetry at home that will drop into the 70s with ambulation and takes approximately 10 minutes to regain her breath.  She denies having any chest pain with this.  Denies having any fevers or chills patient does have moisture to her cough but does not generate sputum.  Patient has noted some mild swelling of the legs.  Denies any fevers or chills no nausea no vomiting.  Patient states that she is very frustrated with her present level of function as she gets quite dyspneic walking from room to room.  She feels she is not being heard by her doctors who are so focused on her malignancy.  Patient states that her primary goal is quality of life not quantity of life.  She states her ex- is dying from metastatic prostate cancer and she would like to go see him in [place] before he expires.  In the emergency room patient is able to talk in pretty full sentences is wearing 3 L nasal cannula O2 support with pulse ox of 91% at rest.  Blood pressure is 116/69 pulse is 84 respiratory rate is only 16 patient sats 97% on 3 L nasal cannula.  BMP has a glucose of 153 bicarb of 36.  LFTs within normal limits.  Patient's BNP is 249 troponin is 7.  White count is 9100 CBC is otherwise unremarkable.  Influenza A and influenza B are not detected RSV is not detected and coronavirus is not detected venous gas shows pH of 7.38 with a pCO2 of 69 venous lactic acid is 1.1.  Chest x-ray shows no acute cardiopulmonary disease soft tissue density mass in the left lower  lobe seen on CT scan imaging is not well-seen in the AP view of the chest.  EKG shows sinus rhythm of 66 patient has Q waves septally that are old no ST segment elevations  Patient is being admitted to the hospital for acute exacerbation of COPD with persistent personal dyspnea.  We will cycle cardiac enzymes.  Patient will have pulmonary as well as cardiology consultation    2/10: Patient was seen and examined, feeling better, shortness of breath is improving still has cough and unable to get things out.  No fever or chills.       Objective     Last Recorded Vitals  /76 (BP Location: Left arm, Patient Position: Lying)   Pulse 76   Temp 36.3 °C (97.4 °F) (Temporal)   Resp 18   Wt 54.4 kg (120 lb)   SpO2 90%   Intake/Output last 3 Shifts:    Intake/Output Summary (Last 24 hours) at 2/10/2024 1104  Last data filed at 2/10/2024 1025  Gross per 24 hour   Intake 200 ml   Output --   Net 200 ml       Admission Weight  Weight: 54.4 kg (120 lb) (02/09/24 1705)    Daily Weight  02/09/24 : 54.4 kg (120 lb)    Image Results  XR chest 1 view  Narrative: STUDY:  Chest Radiograph;  02/09/2024 5:41 PM   INDICATION:  Shortness of breath.  History of small-cell lung cancer.  COMPARISON:  XR chest 12/28/2023.  CTA chest 12/28/2023.  NM PET/CT 01/11/2024.   ACCESSION NUMBER(S):  [id number]  ORDERING CLINICIAN:  TOBIAS KONG  TECHNIQUE:  Frontal chest was obtained at 1740 hours.  FINDINGS:  CARDIOMEDIASTINAL SILHOUETTE:  Cardiomediastinal silhouette is normal in size and configuration.     LUNGS:  In this view the lungs appear to remain clear and similar to the chest  x-ray of last December 28.  The mass in the posterior left lower lobe  seen on the recent PET CT and CTA of the chest is not well visualized  on plain film..     ABDOMEN:  No remarkable upper abdominal findings.     BONES:  No acute osseous changes.  Impression: No acute cardiopulmonary disease is visible and no definite change is  similar compared to the  prior chest x-ray.  As mentioned above a soft  tissue density mass in the left lower lobe seen on recent CT imaging  is not well seen in the AP view of the chest..  Signed by David Walker MD      Physical Exam  Patient is awake and orient, not in apparent distress  Eyes: PERRLA, no conjunctival congestion  Chest: Bilateral decreased air entry, no crackles or wheezing  Heart: s1S2 regular, no murmur  Abdomen: Soft, non tender, BS present  Ext:    Relevant Results               Assessment/Plan      #1 acute exacerbation of COPD  Continue on IV steroid, DuoNeb treatment as well as doxycycline  Monitor clinical status    #2 adenocarcinoma left lung  She has been in the process of evaluation with oncology as well as radiation oncologist.  Unfortunately for patient in her current state of frustration she is more likely to not want to have anything done for her adenocarcinoma of the lung.  Her short-term goal is to be able to get out to [place] to see her ex- who is passing away from prostate cancer and her daughter who also lives out there.     #3 history of pulmonary emboli  Chronically on Eliquis has not missed any doses.     #4 coronary artery disease  Status post remote stenting.   Continue on antiplatelets, metoprolol, statin, Imdur     #5 hypertension  Fair control resume home meds     #6 dyslipidemia  On statin  #7 hyperglycemia  Does not carry diagnosis of diabetes but with steroids will check blood sugar twice daily check hemoglobin A1c     #8 situational stressors  Patient is not handling her ex-'s ill health and not handling her decreasing functional capacities to well.        Whitney Curran MD

## 2024-02-10 NOTE — PROGRESS NOTES
02/10/24 1442   Discharge Planning   Living Arrangements Alone   Support Systems Friends/neighbors   Assistance Needed none   Type of Residence Private residence   Number of Stairs to Enter Residence 4   Number of Stairs Within Residence 0   Who is requesting discharge planning? Provider   Home or Post Acute Services In home services   Type of Home Care Services Housekeeping   Patient expects to be discharged to: Home   Does the patient need discharge transport arranged? No   Financial Resource Strain   How hard is it for you to pay for the very basics like food, housing, medical care, and heating? Not hard   Housing Stability   In the last 12 months, was there a time when you were not able to pay the mortgage or rent on time? N   In the last 12 months, how many places have you lived? 1   In the last 12 months, was there a time when you did not have a steady place to sleep or slept in a shelter (including now)? N   Transportation Needs   In the past 12 months, has lack of transportation kept you from medical appointments or from getting medications? no   In the past 12 months, has lack of transportation kept you from meetings, work, or from getting things needed for daily living? No     Spoke with patient at the bedside, she is from home alone, she's independent in self care, drives. Patient wears 3L oxygen at baseline, provided by [facility]. Patient is very concerned about getting a POC, spoke with Batool at [facility] and she asked that an order be entered and she will work on getting that for the patient.  Patient has a nebulizer, states that she is active with [facility] but has been trying to get HHA from them since August and she keeps getting told that they don't have anyone available. Her son pays for assistance for her once a week.   Patient does not have a big support system at home, her children and ex  all live outside of [place]. She stated she is looking to improve the quality of her life, not the  quantity, she is interested in palliative care, requested referral from physician.  Patient plans to return home when she's medically ready, she denies any additional home going needs at this time.

## 2024-02-10 NOTE — NURSING NOTE
Flutter valve given and instructed on usage.  Pt returned demonstration and verbalized understanding

## 2024-02-10 NOTE — CARE PLAN
The patient's goals for the shift include      The clinical goals for the shift include pt will maintain a pulse ox of 90 or greater    Over the shift, the patient did not make progress toward the following goals. Barriers to progression include . Recommendations to address these barriers include   Problem: Pain  Goal: My pain/discomfort is manageable  Outcome: Progressing     Problem: Safety  Goal: Patient will be injury free during hospitalization  Outcome: Progressing  Goal: I will remain free of falls  Outcome: Progressing     Problem: Daily Care  Goal: Daily care needs are met  Outcome: Progressing     Problem: Psychosocial Needs  Goal: Demonstrates ability to cope with hospitalization/illness  Outcome: Progressing  Goal: Collaborate with me, my family, and caregiver to identify my specific goals  Outcome: Progressing  Flowsheets (Taken 2/10/2024 0118)  Cultural Requests During Hospitalization: na  Spiritual Requests During Hospitalization: na     Problem: Discharge Barriers  Goal: My discharge needs are met  Outcome: Progressing     Problem: Respiratory  Goal: Clear secretions with interventions this shift  Outcome: Progressing  Goal: Minimize anxiety/maximize coping throughout shift  Outcome: Progressing  Goal: Minimal/no exertional discomfort or dyspnea this shift  Outcome: Progressing  Goal: No signs of respiratory distress (eg. Use of accessory muscles. Peds grunting)  Outcome: Progressing  Goal: Patent airway maintained this shift  Outcome: Progressing  Goal: Tolerate mechanical ventilation evidenced by VS/agitation level this shift  Outcome: Progressing  Goal: Tolerate pulmonary toileting this shift  Outcome: Progressing  Goal: Verbalize decreased shortness of breath this shift  Outcome: Progressing  Goal: Wean oxygen to maintain O2 saturation per order/standard this shift  Outcome: Progressing  Goal: Increase self care and/or family involvement in next 24 hours  Outcome: Progressing   .

## 2024-02-10 NOTE — PROGRESS NOTES
Abigail Aquino is a 78 y.o. female admitted for COPD exacerbation (CMS/[facility]). Pharmacy reviewed the patient's [id number] medications and allergies for accuracy.    The list below reflects the PTA list prior to pharmacy medication history. A summary a changes to the PTA medication list has been listed below. Please review each medication in order reconciliation for additional clarification and justification.    Source of information:    Medications added:    Medications modified:    Medications to be removed:   HYDROXYZINE 25 MG     Medications of concern:      Prior to Admission Medications   Prescriptions Last Dose Informant Patient Reported? Taking?   albuterol 90 mcg/actuation inhaler 2/9/2024  No No   Sig: INHALE 2 PUFFS BY MOUTH FOUR TIMES A DAY AS NEEDED FOR SHORTNESS OF BREATH   apixaban (Eliquis) 5 mg (74 tabs) tablet 2/9/2024  No No   Sig: Take 2 tablets (10 mg) by mouth 2 times a day for 7 days, then take 1 tablet (5 mg) by mouth 2 times a day.   budesonide EC (Entocort EC) 3 mg 24 hr capsule More than a month  Yes No   Sig: Take 1 capsule (3 mg) by mouth once daily in the morning.   budesonide-glycopyr-formoterol (Breztri Aerosphere) 160-9-4.8 mcg/actuation HFA aerosol inhaler 2/9/2024  No No   Sig: Inhale 2 puffs 2 times a day.   furosemide (Lasix) 20 mg tablet 2/9/2024  No No   Sig: TAKE 1 TABLET BY MOUTH ONCE DAILY   hydrOXYzine HCL (Atarax) 25 mg tablet Unknown  No No   Sig: Take 1 tablet a night before your PET scan and another tablet on the morning of your PET scan.   ipratropium-albuteroL (Duo-Neb) 0.5-2.5 mg/3 mL nebulizer solution Past Week  No No   Sig: Take 3 mL by nebulization 4 times a day as needed for wheezing or shortness of breath.   isosorbide mononitrate ER (Imdur) 30 mg 24 hr tablet 2/9/2024  Yes No   Sig: Take 1 tablet (30 mg) by mouth once daily.   metoprolol tartrate (Lopressor) 50 mg tablet 2/9/2024  Yes No   Sig: Take 1 tablet by mouth 2 times a day.   pravastatin  (Pravachol) 40 mg tablet 2/9/2024  Yes No   Sig: Take 1 tablet (40 mg) by mouth once daily.      Facility-Administered Medications: None       Kala Dillard CPhT

## 2024-02-10 NOTE — CARE PLAN
The patient's goals for the shift include      The clinical goals for the shift include pt will maintain a pulse ox of 90 or greater

## 2024-02-10 NOTE — ED PROVIDER NOTES
HPI   Chief Complaint   Patient presents with    Shortness of Breath       HPI: []  78-year-old white female history of advanced COPD on home oxygen pulm embolism on Eliquis compliant comes in with cough and shortness breath for last few days.  Cough nonproductive.  No chest pain pressure heaviness.  No fever no chills.  No nausea or diarrhea.  No hematemesis melena hematochezia hemoptysis no recent travel hospitalization or antibiotics.  No headache vision changes.    Past history: Hypertension, pulm embolism, COPD, anxiety  Social: Patient has a history of for tobacco use in the past but denies current tobacco alcohol or drug abuse.  REVIEW OF SYSTEMS:    GENERAL.: No weight loss, fatigue, anorexia, insomnia, fever.    EYES: No vision loss, double vision, drainage, eye pain.    ENT: No pharyngitis, dry mouth.    CARDIOPULMONARY: No chest pain, palpitations, syncope, near syncope.  Positive for shortness of breath, positive for cough, hemoptysis.    GI: No abdominal pain, change in bowel habits, melena, hematemesis, hematochezia, nausea, vomiting, diarrhea.    : No discharge, dysuria, frequency, urgency, hematuria.    MS: No limb pain, joint pain, joint swelling.    SKIN: No rashes.    PSYCH: No depression, anxiety, suicidality, homicidality.    Review of systems is otherwise negative unless stated above or in history of present illness.  Social history, family history, allergies reviewed.  PHYSICAL EXAM:    GENERAL: Vitals noted, mild to moderate distress. Alert and oriented  x 3. Non-toxic.      EENT: TMs clear. Posterior oropharynx unremarkable. No meningismus. No LAD.     NECK: Supple. Nontender. No midline tenderness.     CARDIAC: Tachycardic, regular, rate, rhythm. No murmurs rubs or gallops. No JVD    PULMONARY: Moderate respiratory distress.  Tachypneic, unable to speak in full sentence, audible wheezing or rhonchi bilateral lung fields, no stridor no retractions    ABDOMEN: Soft, nonsurgical. Nontender.  No peritoneal signs. Normoactive bowel sounds. No pulsatile masses.  Negative CVA tenderness    EXTREMITIES: Trace bilateral symmetric nonpitting peripheral edema. Negative Homans bilaterally, no cords.  2+ bounding pulses well-perfused.    SKIN: No rash. Intact.     NEURO: No focal neurologic deficits, NIH score of 0. Cranial nerves normal as tested from II through XII.     MEDICAL DECISION MAKING:  EKG on my interpretation shows normal sinus rhythm, normal axis rate mid 70s with nonspecific ST and T wave changes.  CBC blood chemistry LFTs unremarkable troponin negative  venous gas shows no hypercapnia mildly elevated pCO2 normal pH chest ray shows a chronic changes.    Treatment: IV established, given IV Solu-Medrol multiple nebulizer treatments.    ED course: Patient had initial improvement but she would desaturate with minimal exertion    Impression: Acute COPD exacerbation    Plan set MDM: Elderly Ann-Marie female history advanced COPD on home oxygen 2 L around-the-clock PE on Eliquis compliant comes in with what appears to be COPD/the patient low suspicion for STEMI NSTEMI or recurrent pulm embolism patient is compliant, patient will be hospitalized for further care for ongoing nebulizer treatments IV steroids and appropriate inpatient consultation.                          Paola Coma Scale Score: 15                     Patient History   Past Medical History:   Diagnosis Date    Clotting disorder (CMS/HCC)     COPD (chronic obstructive pulmonary disease) (CMS/HCC)     Eyebrow laceration, right, sequela 10/04/2023    Fracture tibia/fibula, right, sequela 10/11/2021    Hypertension     Kidney stones 05/09/2017     Past Surgical History:   Procedure Laterality Date    APPENDECTOMY      CT GUIDED PERCUTANEOUS BIOPSY LUNG  12/21/2023    CT GUIDED PERCUTANEOUS BIOPSY LUNG 12/21/2023 P[facility] CT    TONSILLECTOMY       Family History   Problem Relation Name Age of Onset    Cancer Daughter       Social History      Tobacco Use    Smoking status: Former     Types: Cigarettes     Quit date: 2023     Years since quittin.4     Passive exposure: Past    Smokeless tobacco: Never   Vaping Use    Vaping Use: Never used   Substance Use Topics    Alcohol use: Not Currently    Drug use: Never       Physical Exam   ED Triage Vitals   Temperature Heart Rate Respirations BP   24 1705 24 1705 24 1705 24 1705   36.8 °C (98.2 °F) 68 (!) 26 113/72      Pulse Ox Temp src Heart Rate Source Patient Position   24 1705 -- 24 2227 --   97 %  Monitor       BP Location FiO2 (%)     -- --             Physical Exam    ED Course & [facility]   ED Course as of 24 Patient EKG on my interpretation shows normal sinus rhythm normal axis rate mid 80s with no acute ischemic change.  Chest x-ray shows chronic changes venous gas shows mild hypercapnia normal pH CBC with differential chemistries unremarkable BNP elevated troponin negative patient was given IV Solu-Medrol multiple nebulizer treatments she had an initial improvement within with minimal exertion she would desaturate so patient was placed on timed bronchodilators and nebulizer treatments and patient will be hospital for further care.   [MT]      ED Course User Index  [MT] Viktor Payan MD         Diagnoses as of 24 234   COPD exacerbation (CMS/[facility])       Medical Decision Making      Procedure  Procedures     Viktor Payan MD  244

## 2024-02-10 NOTE — H&P
History Of Present Illness  Abigail Aquino is a 78 y.o. 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  Patient states that she has pulse oximetry at home that will drop into the 70s with ambulation and takes approximately 10 minutes to regain her breath.  She denies having any chest pain with this.  Denies having any fevers or chills patient does have moisture to her cough but does not generate sputum.  Patient has noted some mild swelling of the legs.  Denies any fevers or chills no nausea no vomiting.  Patient states that she is very frustrated with her present level of function as she gets quite dyspneic walking from room to room.  She feels she is not being heard by her doctors who are so focused on her malignancy.  Patient states that her primary goal is quality of life not quantity of life.  She states her ex- is dying from metastatic prostate cancer and she would like to go see him in [place] before he expires.  In the emergency room patient is able to talk in pretty full sentences is wearing 3 L nasal cannula O2 support with pulse ox of 91% at rest.  Blood pressure is 116/69 pulse is 84 respiratory rate is only 16 patient sats 97% on 3 L nasal cannula.  BMP has a glucose of 153 bicarb of 36.  LFTs within normal limits.  Patient's BNP is 249 troponin is 7.  White count is 9100 CBC is otherwise unremarkable.  Influenza A and influenza B are not detected RSV is not detected and coronavirus is not detected venous gas shows pH of 7.38 with a pCO2 of 69 venous lactic acid is 1.1.  Chest x-ray shows no acute cardiopulmonary disease soft tissue density mass in the left lower lobe seen on CT scan imaging is not well-seen in the AP view of the chest.  EKG shows sinus rhythm  of 66 patient has Q waves septally that are old no ST segment elevations  Patient is being admitted to the hospital for acute exacerbation of COPD with persistent personal dyspnea.  We will cycle cardiac enzymes.  Patient will have pulmonary as well as cardiology consultation.  Past Medical History  She has a past medical history of Clotting disorder (CMS/[facility]), COPD (chronic obstructive pulmonary disease) (CMS/[facility]), Eyebrow laceration, right, sequela (10/04/2023), Fracture tibia/fibula, right, sequela (10/11/2021), Hypertension, and Kidney stones (05/09/2017).    Surgical History  She has a past surgical history that includes Tonsillectomy; Appendectomy; and CT guided percutaneous biopsy lung (12/21/2023).     Social History  Quit smoking 6 months ago has a 60-pack-year history of smoking.  Denies any alcohol abuse.  No illicit substance use    Family History  Family History   Problem Relation Name Age of Onset    Cancer Daughter          Allergies  Atorvastatin and Oxycodone    Meds    Current Facility-Administered Medications:     albuterol 2.5 mg /3 mL (0.083 %) nebulizer solution 3 mL, 3 mL, nebulization, q15 min PRN, Viktor Payan MD, 3 mL at 02/09/24 2226    Current Outpatient Medications:     albuterol 90 mcg/actuation inhaler, INHALE 2 PUFFS BY MOUTH FOUR TIMES A DAY AS NEEDED FOR SHORTNESS OF BREATH, Disp: 18 g, Rfl: 1    apixaban (Eliquis) 5 mg (74 tabs) tablet, Take 2 tablets (10 mg) by mouth 2 times a day for 7 days, then take 1 tablet (5 mg) by mouth 2 times a day., Disp: 74 tablet, Rfl: 0    budesonide-glycopyr-formoterol (Breztri Aerosphere) 160-9-4.8 mcg/actuation HFA aerosol inhaler, Inhale 2 puffs 2 times a day., Disp: 24 g, Rfl: 11    furosemide (Lasix) 20 mg tablet, TAKE 1 TABLET BY MOUTH ONCE DAILY, Disp: 90 tablet, Rfl: 0    hydrOXYzine HCL (Atarax) 25 mg tablet, Take 1 tablet a night before your PET scan and another tablet on the morning of your PET scan., Disp: 2 tablet, Rfl: 2     ipratropium-albuteroL (Duo-Neb) 0.5-2.5 mg/3 mL nebulizer solution, Take 3 mL by nebulization 4 times a day as needed for wheezing or shortness of breath., Disp: 360 mL, Rfl: 11    isosorbide mononitrate ER (Imdur) 30 mg 24 hr tablet, Take 1 tablet (30 mg) by mouth once daily., Disp: , Rfl:     metoprolol tartrate (Lopressor) 50 mg tablet, Take 1 tablet by mouth 2 times a day., Disp: , Rfl:     pravastatin (Pravachol) 40 mg tablet, Take 1 tablet (40 mg) by mouth once daily., Disp: , Rfl:     Review of Systems   Constitutional:  Negative for chills, fatigue and fever.   HENT:  Negative for congestion and sore throat.    Eyes: Negative.    Respiratory:  Positive for cough, shortness of breath and wheezing. Negative for chest tightness.         Exertional dyspnea after going room to room   Cardiovascular:  Positive for leg swelling. Negative for chest pain and palpitations.   Gastrointestinal:  Negative for abdominal pain, blood in stool, nausea and vomiting.   Endocrine: Negative for polydipsia and polyuria.   Genitourinary:  Negative for dysuria and flank pain.   Musculoskeletal:  Negative for back pain, gait problem and neck pain.   Skin: Negative.    Neurological:  Negative for dizziness, seizures, speech difficulty, weakness and numbness.   Psychiatric/Behavioral:  Negative for confusion.         Physical Exam  Constitutional:       Appearance: Normal appearance. She is not ill-appearing, toxic-appearing or diaphoretic.   HENT:      Head: Normocephalic and atraumatic.      Mouth/Throat:      Mouth: Mucous membranes are moist.   Eyes:      Extraocular Movements: Extraocular movements intact.      Pupils: Pupils are equal, round, and reactive to light.   Cardiovascular:      Rate and Rhythm: Normal rate and regular rhythm.      Pulses: Normal pulses.      Heart sounds: Normal heart sounds. No murmur heard.  Pulmonary:      Effort: Respiratory distress present.      Breath sounds: No stridor. Wheezing present. No  rhonchi or rales.   Chest:      Chest wall: No tenderness.   Abdominal:      General: Abdomen is flat. Bowel sounds are normal.      Palpations: Abdomen is soft.      Tenderness: There is no abdominal tenderness.   Musculoskeletal:         General: Normal range of motion.      Right lower leg: No edema.      Left lower leg: No edema.   Skin:     Coloration: Skin is not jaundiced.      Findings: No erythema or rash.   Neurological:      General: No focal deficit present.      Mental Status: She is alert and oriented to person, place, and time.   Psychiatric:         Mood and Affect: Mood normal.         Thought Content: Thought content normal.         Judgment: Judgment normal.          Last Recorded Vitals  /69   Pulse 84   Temp 36.8 °C (98.2 °F)   Resp 16   Wt 54.4 kg (120 lb)   SpO2 97%     Relevant Results     Results for orders placed or performed during the hospital encounter of 02/09/24 (from the past 24 hour(s))   CBC and Auto Differential   Result Value Ref Range    WBC 9.1 4.4 - 11.3 x10*3/uL    nRBC 0.0 0.0 - 0.0 /100 WBCs    RBC 4.75 4.00 - 5.20 x10*6/uL    Hemoglobin 15.6 12.0 - 16.0 g/dL    Hematocrit 46.7 (H) 36.0 - 46.0 %    MCV 98 80 - 100 fL    MCH 32.8 26.0 - 34.0 pg    MCHC 33.4 32.0 - 36.0 g/dL    RDW 12.5 11.5 - 14.5 %    Platelets 189 150 - 450 x10*3/uL    Neutrophils % 69.2 40.0 - 80.0 %    Immature Granulocytes %, Automated 0.3 0.0 - 0.9 %    Lymphocytes % 16.3 13.0 - 44.0 %    Monocytes % 7.9 2.0 - 10.0 %    Eosinophils % 5.5 0.0 - 6.0 %    Basophils % 0.8 0.0 - 2.0 %    Neutrophils Absolute 6.31 (H) 1.60 - 5.50 x10*3/uL    Immature Granulocytes Absolute, Automated 0.03 0.00 - 0.50 x10*3/uL    Lymphocytes Absolute 1.49 0.80 - 3.00 x10*3/uL    Monocytes Absolute 0.72 0.05 - 0.80 x10*3/uL    Eosinophils Absolute 0.50 (H) 0.00 - 0.40 x10*3/uL    Basophils Absolute 0.07 0.00 - 0.10 x10*3/uL   Basic metabolic panel   Result Value Ref Range    Glucose 153 (H) 74 - 99 mg/dL    Sodium 142  136 - 145 mmol/L    Potassium 3.5 3.5 - 5.3 mmol/L    Chloride 100 98 - 107 mmol/L    Bicarbonate 36 (H) 21 - 32 mmol/L    Anion Gap 10 10 - 20 mmol/L    Urea Nitrogen 7 6 - 23 mg/dL    Creatinine 0.57 0.50 - 1.05 mg/dL    eGFR >90 >60 mL/min/1.73m*2    Calcium 8.7 8.6 - 10.3 mg/dL   Hepatic function panel   Result Value Ref Range    Albumin 3.9 3.4 - 5.0 g/dL    Bilirubin, Total 0.5 0.0 - 1.2 mg/dL    Bilirubin, Direct 0.1 0.0 - 0.3 mg/dL    Alkaline Phosphatase 55 33 - 136 U/L    ALT 11 7 - 45 U/L    AST 16 9 - 39 U/L    Total Protein 6.4 6.4 - 8.2 g/dL   Troponin I, High Sensitivity   Result Value Ref Range    Troponin I, High Sensitivity 7 0 - 13 ng/L   B-Type Natriuretic Peptide   Result Value Ref Range     (H) 0 - 99 pg/mL   Blood Gas Venous Full Panel   Result Value Ref Range    POCT pH, Venous 7.38 7.33 - 7.43 pH    POCT pCO2, Venous 69 (H) 41 - 51 mm Hg    POCT pO2, Venous 55 (H) 35 - 45 mm Hg    POCT SO2, Venous 89 (H) 45 - 75 %    POCT Oxy Hemoglobin, Venous 85.4 (H) 45.0 - 75.0 %    POCT Hematocrit Calculated, Venous 47.0 (H) 36.0 - 46.0 %    POCT Sodium, Venous 140 136 - 145 mmol/L    POCT Potassium, Venous 3.6 3.5 - 5.3 mmol/L    POCT Chloride, Venous 99 98 - 107 mmol/L    POCT Ionized Calicum, Venous 1.13 1.10 - 1.33 mmol/L    POCT Glucose, Venous 169 (H) 74 - 99 mg/dL    POCT Lactate, Venous 1.1 0.4 - 2.0 mmol/L    POCT Base Excess, Venous 12.0 (H) -2.0 - 3.0 mmol/L    POCT HCO3 Calculated, Venous 40.8 (H) 22.0 - 26.0 mmol/L    POCT Hemoglobin, Venous 15.7 12.0 - 16.0 g/dL    POCT Anion Gap, Venous 4.0 (L) 10.0 - 25.0 mmol/L    Patient Temperature 37.0 degrees Celsius    FiO2 33 %   Morphology   Result Value Ref Range    RBC Morphology No significant RBC morphology present    Sars-CoV-2 PCR   Result Value Ref Range    Coronavirus 2019, PCR Not Detected Not Detected   RSV PCR   Result Value Ref Range    RSV PCR Not Detected Not Detected   Influenza A, and B PCR   Result Value Ref Range    Flu A  Result Not Detected Not Detected    Flu B Result Not Detected Not Detected        Recent Imaging  XR chest 1 view    Result Date: 2/9/2024  STUDY: Chest Radiograph;  02/09/2024 5:41 PM INDICATION: Shortness of breath.  History of small-cell lung cancer. COMPARISON: XR chest 12/28/2023.  CTA chest 12/28/2023.  NM PET/CT 01/11/2024. ACCESSION NUMBER(S): [id number] ORDERING CLINICIAN: TOBIAS KONG TECHNIQUE:  Frontal chest was obtained at 1740 hours. FINDINGS: CARDIOMEDIASTINAL SILHOUETTE: Cardiomediastinal silhouette is normal in size and configuration.  LUNGS: In this view the lungs appear to remain clear and similar to the chest x-ray of last December 28.  The mass in the posterior left lower lobe seen on the recent PET CT and CTA of the chest is not well visualized on plain film..  ABDOMEN: No remarkable upper abdominal findings.  BONES: No acute osseous changes.    No acute cardiopulmonary disease is visible and no definite change is similar compared to the prior chest x-ray.  As mentioned above a soft tissue density mass in the left lower lobe seen on recent CT imaging is not well seen in the AP view of the chest.. Signed by David Walker MD       Assessment and Plan  #1 acute exacerbation of COPD  Patient with O2 dependent COPD.  Patient is having increasing amount of exertional dyspnea.  We will cycle cardiac enzymes.  Patient is wheezing.  Patient is treated as exacerbation of COPD as there is no definitive infiltrate on chest x-ray.  IV steroids beta agonist O2 support.  Patient states that she has has a hard time utilizing her portable oxygen tanks in the portable oxygen to carry is too heavy to lift.  She states that she feels her health care providers are more focused on the newly found adenocarcinoma of the lung than on her underlying functional capacities with COPD.  This is creating a large frustration for patient that is culminated in today's ER visit and hospitalization.    #2 adenocarcinoma  left lung  She has been in the process of evaluation with oncology as well as radiation oncologist.  Unfortunately for patient in her current state of frustration she is more likely to not want to have anything done for her adenocarcinoma of the lung.  Her short-term goal is to be able to get out to [place] to see her ex- who is passing away from prostate cancer and her daughter who also lives out there.    #3 history of pulmonary emboli  Chronically on Eliquis has not missed any doses.    #4 coronary artery disease  Status post remote stenting.  Has not had any recent stress testing.  With patient having exertional dyspnea echocardiogram and consideration for stress testing to be considered.    #5 hypertension  Fair control resume home meds    #6 dyslipidemia    #7 hyperglycemia  Does not carry diagnosis of diabetes but with steroids will check blood sugar twice daily check hemoglobin A1c    #8 situational stressors  Patient is not handling her ex-'s ill health and not handling her decreasing functional capacities to well.      Bryant Rani MD

## 2024-02-11 PROCEDURE — 2500000001 HC RX 250 WO HCPCS SELF ADMINISTERED DRUGS (ALT 637 FOR MEDICARE OP): Performed by: INTERNAL MEDICINE

## 2024-02-11 PROCEDURE — 99233 SBSQ HOSP IP/OBS HIGH 50: CPT | Performed by: INTERNAL MEDICINE

## 2024-02-11 PROCEDURE — 1100000001 HC PRIVATE ROOM DAILY

## 2024-02-11 PROCEDURE — 2500000004 HC RX 250 GENERAL PHARMACY W/ HCPCS (ALT 636 FOR OP/ED): Performed by: INTERNAL MEDICINE

## 2024-02-11 PROCEDURE — 94668 MNPJ CHEST WALL SBSQ: CPT

## 2024-02-11 RX ADMIN — TIOTROPIUM BROMIDE INHALATION SPRAY 2 PUFF: 3.12 SPRAY, METERED RESPIRATORY (INHALATION) at 08:35

## 2024-02-11 RX ADMIN — METHYLPREDNISOLONE SODIUM SUCCINATE 40 MG: 40 INJECTION, POWDER, FOR SOLUTION INTRAMUSCULAR; INTRAVENOUS at 06:01

## 2024-02-11 RX ADMIN — METOPROLOL TARTRATE 50 MG: 50 TABLET, FILM COATED ORAL at 20:21

## 2024-02-11 RX ADMIN — PRAVASTATIN SODIUM 40 MG: 40 TABLET ORAL at 20:21

## 2024-02-11 RX ADMIN — FLUTICASONE FUROATE AND VILANTEROL TRIFENATATE 1 PUFF: 200; 25 POWDER RESPIRATORY (INHALATION) at 08:35

## 2024-02-11 RX ADMIN — FUROSEMIDE 20 MG: 20 TABLET ORAL at 08:34

## 2024-02-11 RX ADMIN — METHYLPREDNISOLONE SODIUM SUCCINATE 40 MG: 40 INJECTION, POWDER, FOR SOLUTION INTRAMUSCULAR; INTRAVENOUS at 17:01

## 2024-02-11 RX ADMIN — DOXYCYCLINE 100 MG: 100 CAPSULE ORAL at 20:21

## 2024-02-11 RX ADMIN — ISOSORBIDE MONONITRATE 30 MG: 30 TABLET, EXTENDED RELEASE ORAL at 08:34

## 2024-02-11 RX ADMIN — METOPROLOL TARTRATE 50 MG: 50 TABLET, FILM COATED ORAL at 08:34

## 2024-02-11 RX ADMIN — APIXABAN 5 MG: 5 TABLET, FILM COATED ORAL at 20:21

## 2024-02-11 RX ADMIN — DOXYCYCLINE 100 MG: 100 CAPSULE ORAL at 08:34

## 2024-02-11 RX ADMIN — GUAIFENESIN 600 MG: 600 TABLET ORAL at 20:21

## 2024-02-11 RX ADMIN — APIXABAN 5 MG: 5 TABLET, FILM COATED ORAL at 08:34

## 2024-02-11 ASSESSMENT — COGNITIVE AND FUNCTIONAL STATUS - GENERAL
MOBILITY SCORE: 23
DAILY ACTIVITIY SCORE: 24
CLIMB 3 TO 5 STEPS WITH RAILING: A LITTLE
DAILY ACTIVITIY SCORE: 24
CLIMB 3 TO 5 STEPS WITH RAILING: A LITTLE
MOBILITY SCORE: 23

## 2024-02-11 ASSESSMENT — PAIN SCALES - GENERAL
PAINLEVEL_OUTOF10: 0 - NO PAIN
PAINLEVEL_OUTOF10: 0 - NO PAIN

## 2024-02-11 ASSESSMENT — PAIN - FUNCTIONAL ASSESSMENT: PAIN_FUNCTIONAL_ASSESSMENT: 0-10

## 2024-02-11 NOTE — PROGRESS NOTES
Abigail Aquino is a 78 y.o. female on day 1 of admission presenting with COPD exacerbation (CMS/[facility]).      Subjective   Abigail Aquino is a 78 y.o. 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  Patient states that she has pulse oximetry at home that will drop into the 70s with ambulation and takes approximately 10 minutes to regain her breath.  She denies having any chest pain with this.  Denies having any fevers or chills patient does have moisture to her cough but does not generate sputum.  Patient has noted some mild swelling of the legs.  Denies any fevers or chills no nausea no vomiting.  Patient states that she is very frustrated with her present level of function as she gets quite dyspneic walking from room to room.  She feels she is not being heard by her doctors who are so focused on her malignancy.  Patient states that her primary goal is quality of life not quantity of life.  She states her ex- is dying from metastatic prostate cancer and she would like to go see him in [place] before he expires.  In the emergency room patient is able to talk in pretty full sentences is wearing 3 L nasal cannula O2 support with pulse ox of 91% at rest.  Blood pressure is 116/69 pulse is 84 respiratory rate is only 16 patient sats 97% on 3 L nasal cannula.  BMP has a glucose of 153 bicarb of 36.  LFTs within normal limits.  Patient's BNP is 249 troponin is 7.  White count is 9100 CBC is otherwise unremarkable.  Influenza A and influenza B are not detected RSV is not detected and coronavirus is not detected venous gas shows pH of 7.38 with a pCO2 of 69 venous lactic acid is 1.1.  Chest x-ray shows no acute cardiopulmonary disease soft tissue density mass in the left lower  lobe seen on CT scan imaging is not well-seen in the AP view of the chest.  EKG shows sinus rhythm of 66 patient has Q waves septally that are old no ST segment elevations  Patient is being admitted to the hospital for acute exacerbation of COPD with persistent personal dyspnea.  We will cycle cardiac enzymes.  Patient will have pulmonary as well as cardiology consultation    2/10: Patient was seen and examined, feeling better, shortness of breath is improving still has cough and unable to get things out.  No fever or chills.    2/11: Patient was evaluated this morning, no acute event overnight, continues to have cough and shortness of breath, no fever or chills.       Objective     Last Recorded Vitals  /83 (BP Location: Left arm, Patient Position: Lying)   Pulse 72   Temp 36.2 °C (97.2 °F) (Temporal)   Resp 18   Wt 54.4 kg (120 lb)   SpO2 98%   Intake/Output last 3 Shifts:    Intake/Output Summary (Last 24 hours) at 2/11/2024 1138  Last data filed at 2/11/2024 0900  Gross per 24 hour   Intake 830 ml   Output --   Net 830 ml         Admission Weight  Weight: 54.4 kg (120 lb) (02/09/24 1705)    Daily Weight  02/09/24 : 54.4 kg (120 lb)    Image Results  XR chest 1 view  Narrative: STUDY:  Chest Radiograph;  02/09/2024 5:41 PM   INDICATION:  Shortness of breath.  History of small-cell lung cancer.  COMPARISON:  XR chest 12/28/2023.  CTA chest 12/28/2023.  NM PET/CT 01/11/2024.   ACCESSION NUMBER(S):  [id number]  ORDERING CLINICIAN:  TOBIAS KONG  TECHNIQUE:  Frontal chest was obtained at 1740 hours.  FINDINGS:  CARDIOMEDIASTINAL SILHOUETTE:  Cardiomediastinal silhouette is normal in size and configuration.     LUNGS:  In this view the lungs appear to remain clear and similar to the chest  x-ray of last December 28.  The mass in the posterior left lower lobe  seen on the recent PET CT and CTA of the chest is not well visualized  on plain film..     ABDOMEN:  No remarkable upper abdominal findings.      BONES:  No acute osseous changes.  Impression: No acute cardiopulmonary disease is visible and no definite change is  similar compared to the prior chest x-ray.  As mentioned above a soft  tissue density mass in the left lower lobe seen on recent CT imaging  is not well seen in the AP view of the chest..  Signed by David Walker MD      Physical Exam  Patient is awake and orient, not in apparent distress  Eyes: PERRLA, no conjunctival congestion  Chest: Bilateral decreased air entry, no crackles or wheezing  Heart: s1S2 regular, no murmur  Abdomen: Soft, non tender, BS present  Ext:    Relevant Results               Assessment/Plan      Acute on chronic hypoxic/hypercarbic respiratory failure  Continue oxygen and wean as tolerated, baseline oxygen requirement is 3 L.    #1 acute exacerbation of COPD  Continue on IV steroid, DuoNeb treatment as well as doxycycline  Monitor clinical status    #2 adenocarcinoma left lung  Pulmonology/hematology oncology follow-up as an outpatient     #3 history of pulmonary emboli  Chronically on Eliquis has not missed any doses.     #4 coronary artery disease  Status post remote stenting.   Continue on antiplatelets, metoprolol, statin, Imdur     #5 hypertension  Fair control resume home meds     #6 dyslipidemia  On statin  #7 hyperglycemia  Does not carry diagnosis of diabetes but with steroids will check blood sugar twice daily check hemoglobin A1c     #8 situational stressors  Patient is not handling her ex-'s ill health and not handling her decreasing functional capacities to well.        Whitney Curran MD

## 2024-02-11 NOTE — CARE PLAN
The patient's goals for the shift include      The clinical goals for the shift include maintain pulse ox 90% or higher

## 2024-02-11 NOTE — CARE PLAN
The patient's goals for the shift include      The clinical goals for the shift include maintain PO above 90%    Problem: Pain  Goal: My pain/discomfort is manageable  Outcome: Progressing     Problem: Safety  Goal: Patient will be injury free during hospitalization  Outcome: Progressing  Goal: I will remain free of falls  Outcome: Progressing     Problem: Daily Care  Goal: Daily care needs are met  Outcome: Progressing     Problem: Psychosocial Needs  Goal: Demonstrates ability to cope with hospitalization/illness  Outcome: Progressing  Goal: Collaborate with me, my family, and caregiver to identify my specific goals  Outcome: Progressing     Problem: Discharge Barriers  Goal: My discharge needs are met  Outcome: Progressing     Problem: Respiratory  Goal: Clear secretions with interventions this shift  Outcome: Progressing  Goal: Minimize anxiety/maximize coping throughout shift  Outcome: Progressing  Goal: Minimal/no exertional discomfort or dyspnea this shift  Outcome: Progressing  Goal: No signs of respiratory distress (eg. Use of accessory muscles. Peds grunting)  Outcome: Progressing  Goal: Patent airway maintained this shift  Outcome: Progressing  Goal: Tolerate mechanical ventilation evidenced by VS/agitation level this shift  Outcome: Progressing  Goal: Tolerate pulmonary toileting this shift  Outcome: Progressing  Goal: Verbalize decreased shortness of breath this shift  Outcome: Progressing  Goal: Wean oxygen to maintain O2 saturation per order/standard this shift  Outcome: Progressing  Goal: Increase self care and/or family involvement in next 24 hours  Outcome: Progressing     Problem: Pain - Adult  Goal: Verbalizes/displays adequate comfort level or baseline comfort level  Outcome: Progressing     Problem: Safety - Adult  Goal: Free from fall injury  Outcome: Progressing     Problem: Discharge Planning  Goal: Discharge to home or other facility with appropriate resources  Outcome: Progressing      Problem: Chronic Conditions and Co-morbidities  Goal: Patient's chronic conditions and co-morbidity symptoms are monitored and maintained or improved  Outcome: Progressing

## 2024-02-11 NOTE — PROGRESS NOTES
Spoke with [employer] at [facility], order placed for portable oxygen concentrator, Batool advised she would note the account so when they deliver a POC, they can show different models so patient can see which one works best for her.

## 2024-02-12 LAB
ATRIAL RATE: 66 BPM
EST. AVERAGE GLUCOSE BLD GHB EST-MCNC: 131 MG/DL
HBA1C MFR BLD: 6.2 %
P AXIS: 86 DEGREES
PR INTERVAL: 142 MS
Q ONSET: 249 MS
QRS COUNT: 11 BEATS
QRS DURATION: 78 MS
QT INTERVAL: 419 MS
QTC CALCULATION(BAZETT): 439 MS
QTC FREDERICIA: 432 MS
R AXIS: 70 DEGREES
T AXIS: 75 DEGREES
T OFFSET: 459 MS
VENTRICULAR RATE: 66 BPM

## 2024-02-12 PROCEDURE — 94668 MNPJ CHEST WALL SBSQ: CPT

## 2024-02-12 PROCEDURE — 2500000002 HC RX 250 W HCPCS SELF ADMINISTERED DRUGS (ALT 637 FOR MEDICARE OP, ALT 636 FOR OP/ED): Performed by: INTERNAL MEDICINE

## 2024-02-12 PROCEDURE — 2500000005 HC RX 250 GENERAL PHARMACY W/O HCPCS: Performed by: INTERNAL MEDICINE

## 2024-02-12 PROCEDURE — 94760 N-INVAS EAR/PLS OXIMETRY 1: CPT

## 2024-02-12 PROCEDURE — 94640 AIRWAY INHALATION TREATMENT: CPT

## 2024-02-12 PROCEDURE — 2500000004 HC RX 250 GENERAL PHARMACY W/ HCPCS (ALT 636 FOR OP/ED): Performed by: INTERNAL MEDICINE

## 2024-02-12 PROCEDURE — 99223 1ST HOSP IP/OBS HIGH 75: CPT

## 2024-02-12 PROCEDURE — 99233 SBSQ HOSP IP/OBS HIGH 50: CPT | Performed by: INTERNAL MEDICINE

## 2024-02-12 PROCEDURE — 1100000001 HC PRIVATE ROOM DAILY

## 2024-02-12 PROCEDURE — 2500000001 HC RX 250 WO HCPCS SELF ADMINISTERED DRUGS (ALT 637 FOR MEDICARE OP): Performed by: INTERNAL MEDICINE

## 2024-02-12 RX ORDER — SODIUM CHLORIDE FOR INHALATION 3 %
3 VIAL, NEBULIZER (ML) INHALATION 3 TIMES DAILY
Status: DISCONTINUED | OUTPATIENT
Start: 2024-02-12 | End: 2024-02-14 | Stop reason: HOSPADM

## 2024-02-12 RX ORDER — IPRATROPIUM BROMIDE AND ALBUTEROL SULFATE 2.5; .5 MG/3ML; MG/3ML
3 SOLUTION RESPIRATORY (INHALATION) 3 TIMES DAILY
Status: DISCONTINUED | OUTPATIENT
Start: 2024-02-12 | End: 2024-02-14 | Stop reason: HOSPADM

## 2024-02-12 RX ORDER — SODIUM CHLORIDE FOR INHALATION 3 %
3 VIAL, NEBULIZER (ML) INHALATION EVERY 6 HOURS SCHEDULED
Status: DISCONTINUED | OUTPATIENT
Start: 2024-02-12 | End: 2024-02-12

## 2024-02-12 RX ORDER — IPRATROPIUM BROMIDE AND ALBUTEROL SULFATE 2.5; .5 MG/3ML; MG/3ML
3 SOLUTION RESPIRATORY (INHALATION)
Status: DISCONTINUED | OUTPATIENT
Start: 2024-02-12 | End: 2024-02-12

## 2024-02-12 RX ADMIN — APIXABAN 5 MG: 5 TABLET, FILM COATED ORAL at 09:13

## 2024-02-12 RX ADMIN — METOPROLOL TARTRATE 50 MG: 50 TABLET, FILM COATED ORAL at 09:13

## 2024-02-12 RX ADMIN — DOXYCYCLINE 100 MG: 100 CAPSULE ORAL at 09:13

## 2024-02-12 RX ADMIN — ISOSORBIDE MONONITRATE 30 MG: 30 TABLET, EXTENDED RELEASE ORAL at 09:13

## 2024-02-12 RX ADMIN — GUAIFENESIN 600 MG: 600 TABLET ORAL at 09:23

## 2024-02-12 RX ADMIN — FLUTICASONE FUROATE AND VILANTEROL TRIFENATATE 1 PUFF: 200; 25 POWDER RESPIRATORY (INHALATION) at 09:00

## 2024-02-12 RX ADMIN — IPRATROPIUM BROMIDE AND ALBUTEROL SULFATE 3 ML: 2.5; .5 SOLUTION RESPIRATORY (INHALATION) at 21:08

## 2024-02-12 RX ADMIN — PRAVASTATIN SODIUM 40 MG: 40 TABLET ORAL at 20:45

## 2024-02-12 RX ADMIN — DOXYCYCLINE 100 MG: 100 CAPSULE ORAL at 20:45

## 2024-02-12 RX ADMIN — METOPROLOL TARTRATE 50 MG: 50 TABLET, FILM COATED ORAL at 20:45

## 2024-02-12 RX ADMIN — METHYLPREDNISOLONE SODIUM SUCCINATE 40 MG: 40 INJECTION, POWDER, FOR SOLUTION INTRAMUSCULAR; INTRAVENOUS at 17:31

## 2024-02-12 RX ADMIN — IPRATROPIUM BROMIDE AND ALBUTEROL SULFATE 3 ML: 2.5; .5 SOLUTION RESPIRATORY (INHALATION) at 11:49

## 2024-02-12 RX ADMIN — METHYLPREDNISOLONE SODIUM SUCCINATE 40 MG: 40 INJECTION, POWDER, FOR SOLUTION INTRAMUSCULAR; INTRAVENOUS at 05:15

## 2024-02-12 RX ADMIN — GUAIFENESIN 600 MG: 600 TABLET ORAL at 20:45

## 2024-02-12 RX ADMIN — SODIUM CHLORIDE SOLN NEBU 3% 3 ML: 3 NEBU SOLN at 11:49

## 2024-02-12 RX ADMIN — TIOTROPIUM BROMIDE INHALATION SPRAY 2 PUFF: 3.12 SPRAY, METERED RESPIRATORY (INHALATION) at 09:12

## 2024-02-12 RX ADMIN — FUROSEMIDE 20 MG: 20 TABLET ORAL at 09:13

## 2024-02-12 RX ADMIN — SODIUM CHLORIDE SOLN NEBU 3% 3 ML: 3 NEBU SOLN at 21:10

## 2024-02-12 RX ADMIN — APIXABAN 5 MG: 5 TABLET, FILM COATED ORAL at 20:45

## 2024-02-12 ASSESSMENT — COGNITIVE AND FUNCTIONAL STATUS - GENERAL
DAILY ACTIVITIY SCORE: 24
MOBILITY SCORE: 23
CLIMB 3 TO 5 STEPS WITH RAILING: A LITTLE
MOBILITY SCORE: 23
CLIMB 3 TO 5 STEPS WITH RAILING: A LITTLE
DAILY ACTIVITIY SCORE: 24

## 2024-02-12 ASSESSMENT — PAIN - FUNCTIONAL ASSESSMENT
PAIN_FUNCTIONAL_ASSESSMENT: 0-10
PAIN_FUNCTIONAL_ASSESSMENT: 0-10

## 2024-02-12 ASSESSMENT — PAIN SCALES - GENERAL
PAINLEVEL_OUTOF10: 2
PAINLEVEL_OUTOF10: 2
PAINLEVEL_OUTOF10: 0 - NO PAIN

## 2024-02-12 NOTE — PROGRESS NOTES
Abigail Aquino is a 78 y.o. female on day 2 of admission presenting with COPD exacerbation (CMS/[facility]).      Subjective   Abigail Aquino is a 78 y.o. 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  Patient states that she has pulse oximetry at home that will drop into the 70s with ambulation and takes approximately 10 minutes to regain her breath.  She denies having any chest pain with this.  Denies having any fevers or chills patient does have moisture to her cough but does not generate sputum.  Patient has noted some mild swelling of the legs.  Denies any fevers or chills no nausea no vomiting.  Patient states that she is very frustrated with her present level of function as she gets quite dyspneic walking from room to room.  She feels she is not being heard by her doctors who are so focused on her malignancy.  Patient states that her primary goal is quality of life not quantity of life.  She states her ex- is dying from metastatic prostate cancer and she would like to go see him in [place] before he expires.  In the emergency room patient is able to talk in pretty full sentences is wearing 3 L nasal cannula O2 support with pulse ox of 91% at rest.  Blood pressure is 116/69 pulse is 84 respiratory rate is only 16 patient sats 97% on 3 L nasal cannula.  BMP has a glucose of 153 bicarb of 36.  LFTs within normal limits.  Patient's BNP is 249 troponin is 7.  White count is 9100 CBC is otherwise unremarkable.  Influenza A and influenza B are not detected RSV is not detected and coronavirus is not detected venous gas shows pH of 7.38 with a pCO2 of 69 venous lactic acid is 1.1.  Chest x-ray shows no acute cardiopulmonary disease soft tissue density mass in the left lower  lobe seen on CT scan imaging is not well-seen in the AP view of the chest.  EKG shows sinus rhythm of 66 patient has Q waves septally that are old no ST segment elevations  Patient is being admitted to the hospital for acute exacerbation of COPD with persistent personal dyspnea.  We will cycle cardiac enzymes.  Patient will have pulmonary as well as cardiology consultation    2/10: Patient was seen and examined, feeling better, shortness of breath is improving still has cough and unable to get things out.  No fever or chills.    2/11: Patient was evaluated this morning, no acute event overnight, continues to have cough and shortness of breath, no fever or chills.  2/12: Patient continues to have cough, also complains of shortness of breath worse with exertion.  No fever or chills.       Objective     Last Recorded Vitals  /79 (BP Location: Left arm, Patient Position: Sitting)   Pulse 58   Temp 36.4 °C (97.5 °F) (Temporal)   Resp 18   Wt 54.4 kg (120 lb)   SpO2 96%   Intake/Output last 3 Shifts:  No intake or output data in the 24 hours ending 02/12/24 1342      Admission Weight  Weight: 54.4 kg (120 lb) (02/09/24 1705)    Daily Weight  02/09/24 : 54.4 kg (120 lb)    Image Results  ECG 12 lead  Sinus rhythm  Probable left atrial enlargement  Probable anteroseptal infarct, old  Baseline wander in lead(s) V4      Physical Exam  Patient is awake and orient, not in apparent distress  Eyes: PERRLA, no conjunctival congestion  Chest: Bilateral decreased air entry, no crackles or wheezing  Heart: s1S2 regular, no murmur  Abdomen: Soft, non tender, BS present  Ext:    Relevant Results               Assessment/Plan      Acute on chronic hypoxic/hypercarbic respiratory failure  Continue oxygen and wean as tolerated, baseline oxygen requirement is 3 L.    #1 acute exacerbation of COPD  Continue on IV steroid, DuoNeb treatment as well as doxycycline  Will start on hypertonic nebs  Monitor clinical status    #2  adenocarcinoma left lung  Pulmonology/hematology oncology follow-up as an outpatient     #3 history of pulmonary emboli  Chronically on Eliquis has not missed any doses.     #4 coronary artery disease  Status post remote stenting.   Continue on antiplatelets, metoprolol, statin, Imdur     #5 hypertension  Fair control resume home meds     #6 dyslipidemia  On statin  #7 hyperglycemia  Does not carry diagnosis of diabetes but with steroids will check blood sugar twice daily   check hemoglobin A1c         Whitney Curran MD

## 2024-02-12 NOTE — PROGRESS NOTES
Social work consult placed for positive medical risk screen. SW reviewed pt's chart and communicated with TCC. No SW needs foreseen at this time. SW signing off; available upon request.    Donny Dixon, MSW, LSW ([id number])   Care Transitions

## 2024-02-12 NOTE — CONSULTS
" Palliative Care Consultation    History obtained from: patient and medical records     HPI: Abigail Aquino is a 78 y.o. female with past medical history significant for claustrophobia, pulmonary embolism , lung nodule found in  and recently diagnosed with stage IB adenocarcinoma left lung cancer 2023, chronic hypoxic respiratory failure on home oxygen 3 L  since 2023, COPD, MI , hypertension, dyslipidemia, and anxiety. Patient presented to the ED on  d/t shortness of breath and lower extremity edema. She is being treated for acute on chronic hypoxic/hypercarbic respiratory failure and COPD exacerbation    Living arrangement and functional status prior to admission: Abigail lives at home alone where she is independent in self care and still driving however explains she has had poor balance poor since her leg surgery in  and requires frequent breaks d/t TUBBS. She has been paying her neighbors to assist with laundry, getting the mail, grocery shopping (uses the motorized grocery carts) with her and carrying the groceries/putting them away, and cutting the lawn. She explains that she is active with [facility] and has been approved for home health aid hours however they are unable to staff. It has been difficult to drive herself to all of her scheduled appointments and needs  parking.     QOL over last 6 months: states it has \"100% deteriorated with breathing since August\"     Falls: denies     Prior Hospitalizations: patient has had 2 hospitalizations and 3 ED visits in the last 6 months   23: ED hemoptysis   23: ED left sided chest pain and shortness of breath   23: ED edema  -23: acute on chronic respiratory failure and diastolic heart failure, COPD Exac, chronic PE : feet/ankles with edema     Cognitive status: no confusion     PSH: right leg surgery including mary anne/plates/pins , lung biopsy , cardiac stent     FH: father  at " "65 \"massive coronary\", daughter cervical cancer,     Social History   Marital Status:    Children: 2 children including son who lives in [place] and daughter who lives in [place]   Employment: worked as a on site  and then    [place] Status: not a    Tobacco/alcohol/elicit substance use history: former smoker, started at age 11 and quit August 2023. Denies alcohol and drugs   Amish/Cultural/Spiritual: believes in a higher being, no particular preferences  Patient finds jc and happiness in: cats, misses the family unit of everyone together but explains that they have lived sepeartely for many years, traveling in the past as son is a     Palliative care consulted for goals of care, code status, advanced directive, high risk for readmissions, symptom management and outpatient support.    Joint interprofessional visit completed with Donny Cardoso PharmD    Met with patient who declined other family/loved ones to be present. Discussed PMH, HPI and current medical condition. She explains that she did not have health insurance until August and that slowed down authorization for PET scan. She follows with [facility] outpatient pulmonary group and states her goal is to improve her respiratory status and not focus on the cancer which she states is slow growing. She is going to receive further testing followed by radiation and likely cancer. Patient reports that she does not want to go through with the bronch or treatments at this time but when discussion option of hospice care without testing and possible treatments, she was not interested. Appears her respiratory status, declining functional status and not having a strong support system at home may make treatments difficult. Answered questions and provided support and education.     Goals of Care  Decisional Capacity: has capacity   Understanding of illness: unable to surgically resect lung " "tumor d/t COPD, Dr. Wooten feels radiation will help and she will likely be offered chemo with Dr. Castañeda.   Expectations of treatment/goals of care: her goals are to remain independent and continue to be able to live alone. She states she is not concerned about her lung cancer and feels it is slow growing. She is more interested in quality of life vs quantity. She would like to visit her ex- in [place] before he dies from a terminal condition. Would like to receive a smaller/portable oxygen concentrator for appointments in which she is working with pulmonary and oncology.   Fears: want to focus on lungs and \"day to day living\" and then worry about the cancer    Advanced Care Planning  Advance Directives:    Health care power of : does not have, agreeable to completing but wants to speak to her son first. Provided blank copy of document    Living Will: does not have, agreeable to completing but wants to speak to her son first. Provided blank copy of document     Current code status: Full Code   Code status discussed today? Wishes for DNR/DNI however wishes to speak to her son before changing code status     Is the patient hospice-eligible? yes  Was a discussion held re: hospice services? yes  Was a decision made re: hospice services? no, provided information, agency list, and pamphlets for patient/family to review    Was a discussion held re: outpatient palliative care services? yes  Was a decision made re: outpatient palliative care services? no, provided information, agency list, and pamphlets for patient/family to review      Review of Systems   Constitutional:  Positive for activity change and fatigue.   HENT:  Positive for hearing loss (weas hearing aids).         Dry mouth, unable to keep upper plate in    Eyes:         Wears glasses   Respiratory:  Positive for cough and shortness of breath.    Cardiovascular:  Positive for leg swelling.   Neurological:  Positive for weakness. "   Psychiatric/Behavioral:  Negative for confusion. The patient is nervous/anxious.        Physical Exam  Constitutional:       General: She is not in acute distress.  HENT:      Head: Normocephalic.      Nose: Nose normal.      Mouth/Throat:      Mouth: Mucous membranes are moist.   Eyes:      General: No scleral icterus.  Pulmonary:      Effort: Pulmonary effort is normal.      Comments: No conversational dyspnea noted  Musculoskeletal:      Cervical back: Neck supple.   Skin:     General: Skin is warm and dry.   Neurological:      Mental Status: She is alert and oriented to person, place, and time.   Psychiatric:      Comments: Tearful at times when discussing her decline and frustrations with medical system          Plan    Goals of care: continue current aggressive measures and accepting of further discussions of goals of care, improve respiratory status   Completed advanced directives: patient provided with blank health care power of  and living will documents. She wishes to speak to her son prior to completing  Change in code status: no, patient states she would like to have a DNR/DNI however would like to speak to her son prior to making official changes.   Consult music therapy and pastoral care   Outpatient services/resources recommended: hospice which would align with patients verbalized goals of care however she states she is not ready for hospice and does not appear to be interested in outpatient palliative care either. Could consider supportive oncology/outpatient palliative care at [facility] however it has been difficult for patient to get to medical appointments. Will follow up    I spent 90 minutes in the professional and overall care of this patient.      Johnathan Ortega, APRN-CNP

## 2024-02-13 LAB
ANION GAP SERPL CALC-SCNC: 10 MMOL/L (ref 10–20)
BUN SERPL-MCNC: 22 MG/DL (ref 6–23)
CALCIUM SERPL-MCNC: 9 MG/DL (ref 8.6–10.3)
CHLORIDE SERPL-SCNC: 100 MMOL/L (ref 98–107)
CO2 SERPL-SCNC: 38 MMOL/L (ref 21–32)
CREAT SERPL-MCNC: 0.61 MG/DL (ref 0.5–1.05)
EGFRCR SERPLBLD CKD-EPI 2021: >90 ML/MIN/1.73M*2
GLUCOSE SERPL-MCNC: 146 MG/DL (ref 74–99)
MAGNESIUM SERPL-MCNC: 1.88 MG/DL (ref 1.6–2.4)
POTASSIUM SERPL-SCNC: 3.8 MMOL/L (ref 3.5–5.3)
SODIUM SERPL-SCNC: 144 MMOL/L (ref 136–145)

## 2024-02-13 PROCEDURE — 1100000001 HC PRIVATE ROOM DAILY

## 2024-02-13 PROCEDURE — 99231 SBSQ HOSP IP/OBS SF/LOW 25: CPT

## 2024-02-13 PROCEDURE — 2500000002 HC RX 250 W HCPCS SELF ADMINISTERED DRUGS (ALT 637 FOR MEDICARE OP, ALT 636 FOR OP/ED): Performed by: INTERNAL MEDICINE

## 2024-02-13 PROCEDURE — 94640 AIRWAY INHALATION TREATMENT: CPT

## 2024-02-13 PROCEDURE — 94667 MNPJ CHEST WALL 1ST: CPT

## 2024-02-13 PROCEDURE — 2500000004 HC RX 250 GENERAL PHARMACY W/ HCPCS (ALT 636 FOR OP/ED): Performed by: INTERNAL MEDICINE

## 2024-02-13 PROCEDURE — 99233 SBSQ HOSP IP/OBS HIGH 50: CPT | Performed by: INTERNAL MEDICINE

## 2024-02-13 PROCEDURE — 80048 BASIC METABOLIC PNL TOTAL CA: CPT | Performed by: INTERNAL MEDICINE

## 2024-02-13 PROCEDURE — 2500000001 HC RX 250 WO HCPCS SELF ADMINISTERED DRUGS (ALT 637 FOR MEDICARE OP): Performed by: INTERNAL MEDICINE

## 2024-02-13 PROCEDURE — 36415 COLL VENOUS BLD VENIPUNCTURE: CPT | Performed by: INTERNAL MEDICINE

## 2024-02-13 PROCEDURE — 83735 ASSAY OF MAGNESIUM: CPT | Performed by: INTERNAL MEDICINE

## 2024-02-13 PROCEDURE — 2500000005 HC RX 250 GENERAL PHARMACY W/O HCPCS: Performed by: INTERNAL MEDICINE

## 2024-02-13 RX ADMIN — METHYLPREDNISOLONE SODIUM SUCCINATE 40 MG: 40 INJECTION, POWDER, FOR SOLUTION INTRAMUSCULAR; INTRAVENOUS at 05:20

## 2024-02-13 RX ADMIN — METOPROLOL TARTRATE 50 MG: 50 TABLET, FILM COATED ORAL at 09:07

## 2024-02-13 RX ADMIN — SODIUM CHLORIDE SOLN NEBU 3% 3 ML: 3 NEBU SOLN at 20:23

## 2024-02-13 RX ADMIN — METHYLPREDNISOLONE SODIUM SUCCINATE 40 MG: 40 INJECTION, POWDER, FOR SOLUTION INTRAMUSCULAR; INTRAVENOUS at 17:51

## 2024-02-13 RX ADMIN — METOPROLOL TARTRATE 50 MG: 50 TABLET, FILM COATED ORAL at 21:02

## 2024-02-13 RX ADMIN — APIXABAN 5 MG: 5 TABLET, FILM COATED ORAL at 21:02

## 2024-02-13 RX ADMIN — IPRATROPIUM BROMIDE AND ALBUTEROL SULFATE 3 ML: 2.5; .5 SOLUTION RESPIRATORY (INHALATION) at 11:45

## 2024-02-13 RX ADMIN — DOXYCYCLINE 100 MG: 100 CAPSULE ORAL at 09:07

## 2024-02-13 RX ADMIN — DOXYCYCLINE 100 MG: 100 CAPSULE ORAL at 21:02

## 2024-02-13 RX ADMIN — PRAVASTATIN SODIUM 40 MG: 40 TABLET ORAL at 21:02

## 2024-02-13 RX ADMIN — GUAIFENESIN 600 MG: 600 TABLET ORAL at 09:13

## 2024-02-13 RX ADMIN — FLUTICASONE FUROATE AND VILANTEROL TRIFENATATE 1 PUFF: 200; 25 POWDER RESPIRATORY (INHALATION) at 09:00

## 2024-02-13 RX ADMIN — SODIUM CHLORIDE SOLN NEBU 3% 3 ML: 3 NEBU SOLN at 11:45

## 2024-02-13 RX ADMIN — APIXABAN 5 MG: 5 TABLET, FILM COATED ORAL at 09:07

## 2024-02-13 RX ADMIN — ISOSORBIDE MONONITRATE 30 MG: 30 TABLET, EXTENDED RELEASE ORAL at 09:07

## 2024-02-13 RX ADMIN — TIOTROPIUM BROMIDE INHALATION SPRAY 2 PUFF: 3.12 SPRAY, METERED RESPIRATORY (INHALATION) at 09:04

## 2024-02-13 RX ADMIN — GUAIFENESIN 600 MG: 600 TABLET ORAL at 21:02

## 2024-02-13 RX ADMIN — FUROSEMIDE 20 MG: 20 TABLET ORAL at 09:07

## 2024-02-13 RX ADMIN — IPRATROPIUM BROMIDE AND ALBUTEROL SULFATE 3 ML: 2.5; .5 SOLUTION RESPIRATORY (INHALATION) at 20:22

## 2024-02-13 ASSESSMENT — COGNITIVE AND FUNCTIONAL STATUS - GENERAL
CLIMB 3 TO 5 STEPS WITH RAILING: A LITTLE
MOBILITY SCORE: 23
DAILY ACTIVITIY SCORE: 24
CLIMB 3 TO 5 STEPS WITH RAILING: A LITTLE
DAILY ACTIVITIY SCORE: 24
MOBILITY SCORE: 23

## 2024-02-13 ASSESSMENT — PAIN - FUNCTIONAL ASSESSMENT
PAIN_FUNCTIONAL_ASSESSMENT: 0-10
PAIN_FUNCTIONAL_ASSESSMENT: 0-10

## 2024-02-13 ASSESSMENT — ENCOUNTER SYMPTOMS
WEAKNESS: 1
FATIGUE: 1
ACTIVITY CHANGE: 1
NERVOUS/ANXIOUS: 1
SHORTNESS OF BREATH: 1
CONFUSION: 0
COUGH: 1

## 2024-02-13 ASSESSMENT — PAIN SCALES - GENERAL
PAINLEVEL_OUTOF10: 3
PAINLEVEL_OUTOF10: 0 - NO PAIN

## 2024-02-13 ASSESSMENT — PAIN DESCRIPTION - DESCRIPTORS: DESCRIPTORS: ACHING

## 2024-02-13 NOTE — PROGRESS NOTES
Spiritual Care Visit    Clinical Encounter Type  Routine Visit: Introduction    Mormonism Encounters  Mormonism Needs: Prayer

## 2024-02-13 NOTE — PROGRESS NOTES
Palliative Care Follow-Up Progress Note    Abigail Aquino is a 78 y.o. female on day 3 of admission. Past medical history significant for claustrophobia, pulmonary embolism 2021, lung nodule found in 2021 and recently diagnosed with stage IB adenocarcinoma left lung cancer December 2023 (has not started treatments), chronic hypoxic respiratory failure on home oxygen 3 L  since August 2023, COPD, MI 2009, hypertension, dyslipidemia, and anxiety. Patient presented to the ED on 2/9 d/t shortness of breath and lower extremity edema. She is being treated for acute on chronic hypoxic/hypercarbic respiratory failure and COPD exacerbation     2/13/2024: Joint interprofessional visit completed with Donny Cardoso PharmD. Patient seen and examined, no visitors at bedside. Reports she did not sleep well but declines medications, breathing remains the same.     Symptom Assessment  Bowels: denies  Nausea/Vomiting: denies  Chronic insomnia: present, declines medication  Dyspnea: present    Physical Exam  Constitutional:       General: She is not in acute distress.  HENT:      Head: Normocephalic.      Nose: Nose normal.      Mouth/Throat:      Mouth: Mucous membranes are moist.   Eyes:      General: No scleral icterus.     Comments: Wearing glasses   Cardiovascular:      Rate and Rhythm: Normal rate.   Pulmonary:      Effort: Pulmonary effort is normal.      Comments: Wearing oxygen via NC  Abdominal:      General: Bowel sounds are normal.      Palpations: Abdomen is soft.   Musculoskeletal:      Cervical back: Neck supple.   Skin:     General: Skin is warm and dry.   Neurological:      Mental Status: She is alert and oriented to person, place, and time.   Psychiatric:         Mood and Affect: Mood normal.       Plan     Follow up re: Goals of Care and Advanced Directives     Goals of care: continue current aggressive measures and accepting of further discussions of goals of care, follow up with outpatient oncology and  radiation oncology, improve respiratory status. Wishes to remain full code and will discuss further with her son.     Advanced Directives: patient provided with blank health care power of  and living will documents. She wishes to speak to her son prior to completing     Outpatient services: recommended outpatient palliative care, patient declined      Collaborated with: RN    I spent 10 minutes in the professional and overall care of this patient.    Will sign off.     Johnathan Ortega, APRN-CNP

## 2024-02-13 NOTE — CARE PLAN
The patient's goals for the shift include breathe easier    The clinical goals for the shift include breathe easier    Problem: Pain  Goal: My pain/discomfort is manageable  Outcome: Progressing     Problem: Safety  Goal: Patient will be injury free during hospitalization  Outcome: Progressing  Goal: I will remain free of falls  Outcome: Progressing     Problem: Daily Care  Goal: Daily care needs are met  Outcome: Progressing     Problem: Psychosocial Needs  Goal: Demonstrates ability to cope with hospitalization/illness  Outcome: Progressing  Goal: Collaborate with me, my family, and caregiver to identify my specific goals  Outcome: Progressing     Problem: Discharge Barriers  Goal: My discharge needs are met  Outcome: Progressing     Problem: Respiratory  Goal: Clear secretions with interventions this shift  Outcome: Progressing  Goal: Minimize anxiety/maximize coping throughout shift  Outcome: Progressing  Goal: Minimal/no exertional discomfort or dyspnea this shift  Outcome: Progressing  Goal: No signs of respiratory distress (eg. Use of accessory muscles. Peds grunting)  Outcome: Progressing  Goal: Patent airway maintained this shift  Outcome: Progressing  Goal: Tolerate mechanical ventilation evidenced by VS/agitation level this shift  Outcome: Progressing  Goal: Tolerate pulmonary toileting this shift  Outcome: Progressing  Goal: Verbalize decreased shortness of breath this shift  Outcome: Progressing  Goal: Wean oxygen to maintain O2 saturation per order/standard this shift  Outcome: Progressing  Goal: Increase self care and/or family involvement in next 24 hours  Outcome: Progressing     Problem: Discharge Planning  Goal: Discharge to home or other facility with appropriate resources  Outcome: Progressing     Problem: Chronic Conditions and Co-morbidities  Goal: Patient's chronic conditions and co-morbidity symptoms are monitored and maintained or improved  Outcome: Progressing

## 2024-02-13 NOTE — PROGRESS NOTES
Abigail Aquino is a 78 y.o. female on day 3 of admission presenting with COPD exacerbation (CMS/Formerly [facility]).      Subjective   Abigail Aquino is a 78 y.o. 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  Patient states that she has pulse oximetry at home that will drop into the 70s with ambulation and takes approximately 10 minutes to regain her breath.  She denies having any chest pain with this.  Denies having any fevers or chills patient does have moisture to her cough but does not generate sputum.  Patient has noted some mild swelling of the legs.  Denies any fevers or chills no nausea no vomiting.  Patient states that she is very frustrated with her present level of function as she gets quite dyspneic walking from room to room.  She feels she is not being heard by her doctors who are so focused on her malignancy.  Patient states that her primary goal is quality of life not quantity of life.  She states her ex- is dying from metastatic prostate cancer and she would like to go see him in [place] before he expires.  In the emergency room patient is able to talk in pretty full sentences is wearing 3 L nasal cannula O2 support with pulse ox of 91% at rest.  Blood pressure is 116/69 pulse is 84 respiratory rate is only 16 patient sats 97% on 3 L nasal cannula.  BMP has a glucose of 153 bicarb of 36.  LFTs within normal limits.  Patient's BNP is 249 troponin is 7.  White count is 9100 CBC is otherwise unremarkable.  Influenza A and influenza B are not detected RSV is not detected and coronavirus is not detected venous gas shows pH of 7.38 with a pCO2 of 69 venous lactic acid is 1.1.  Chest x-ray shows no acute cardiopulmonary disease soft tissue density mass in the left lower  lobe seen on CT scan imaging is not well-seen in the AP view of the chest.  EKG shows sinus rhythm of 66 patient has Q waves septally that are old no ST segment elevations  Patient is being admitted to the hospital for acute exacerbation of COPD with persistent personal dyspnea.  We will cycle cardiac enzymes.  Patient will have pulmonary as well as cardiology consultation    2/10: Patient was seen and examined, feeling better, shortness of breath is improving still has cough and unable to get things out.  No fever or chills.    2/11: Patient was evaluated this morning, no acute event overnight, continues to have cough and shortness of breath, no fever or chills.  2/12: Patient continues to have cough, also complains of shortness of breath worse with exertion.  No fever or chills.  2/13: Patient is feeling a bit better today cough and shortness of breath is improving, no fever or chills.       Objective     Last Recorded Vitals  /86 (BP Location: Right arm, Patient Position: Lying)   Pulse 81   Temp 36.8 °C (98.2 °F) (Temporal)   Resp 16   Wt 54.4 kg (120 lb)   SpO2 98%   Intake/Output last 3 Shifts:    Intake/Output Summary (Last 24 hours) at 2/13/2024 1153  Last data filed at 2/13/2024 0900  Gross per 24 hour   Intake 360 ml   Output --   Net 360 ml         Admission Weight  Weight: 54.4 kg (120 lb) (02/09/24 1705)    Daily Weight  02/09/24 : 54.4 kg (120 lb)    Image Results  ECG 12 lead  Sinus rhythm  Probable left atrial enlargement  Probable anteroseptal infarct, old  Baseline wander in lead(s) V4    See ED provider note for full interpretation and clinical correlation  Confirmed by Sweta Toney ([id number]) on 2/12/2024 7:38:08 PM      Physical Exam  Patient is awake and orient, not in apparent distress  Eyes: PERRLA, no conjunctival congestion  Chest: Bilateral decreased air entry, no crackles or wheezing  Heart: s1S2 regular, no murmur  Abdomen: Soft, non tender, BS present  Ext:    Relevant  Results               Assessment/Plan      Acute on chronic hypoxic/hypercarbic respiratory failure  Continue oxygen and wean as tolerated, baseline oxygen requirement is 3 L.    #1 acute exacerbation of COPD  Continue on IV steroid, DuoNeb treatment as well as doxycycline  Continue on hypertonic saline nebulizers  Monitor clinical status    #2 adenocarcinoma left lung  Pulmonology/hematology oncology follow-up as an outpatient     #3 history of pulmonary emboli  Chronically on Eliquis has not missed any doses.     #4 coronary artery disease  Status post remote stenting.   Continue on antiplatelets, metoprolol, statin, Imdur     #5 hypertension  Fair control resume home meds     #6 dyslipidemia  On statin  #7 hyperglycemia  Does not carry diagnosis of diabetes but with steroids will check blood sugar twice daily   A1c of 6.2 suggestive of prediabetic status         Whitney Curran MD

## 2024-02-14 ENCOUNTER — TELEPHONE (OUTPATIENT)
Dept: CASE MANAGEMENT | Facility: HOSPITAL | Age: 79
End: 2024-02-14
Payer: COMMERCIAL

## 2024-02-14 VITALS
HEIGHT: 63 IN | SYSTOLIC BLOOD PRESSURE: 139 MMHG | RESPIRATION RATE: 18 BRPM | WEIGHT: 120 LBS | DIASTOLIC BLOOD PRESSURE: 79 MMHG | OXYGEN SATURATION: 91 % | BODY MASS INDEX: 21.26 KG/M2 | HEART RATE: 72 BPM | TEMPERATURE: 97.8 F

## 2024-02-14 PROCEDURE — 2500000001 HC RX 250 WO HCPCS SELF ADMINISTERED DRUGS (ALT 637 FOR MEDICARE OP): Performed by: INTERNAL MEDICINE

## 2024-02-14 PROCEDURE — 2500000004 HC RX 250 GENERAL PHARMACY W/ HCPCS (ALT 636 FOR OP/ED): Performed by: INTERNAL MEDICINE

## 2024-02-14 PROCEDURE — 99239 HOSP IP/OBS DSCHRG MGMT >30: CPT | Performed by: INTERNAL MEDICINE

## 2024-02-14 RX ORDER — PREDNISONE 20 MG/1
TABLET ORAL
Qty: 20 TABLET | Refills: 0 | Status: SHIPPED | OUTPATIENT
Start: 2024-02-14 | End: 2024-02-25

## 2024-02-14 RX ORDER — ACETAMINOPHEN 325 MG/1
650 TABLET ORAL EVERY 4 HOURS PRN
Status: DISCONTINUED | OUTPATIENT
Start: 2024-02-14 | End: 2024-02-14 | Stop reason: HOSPADM

## 2024-02-14 RX ORDER — DOXYCYCLINE 100 MG/1
100 CAPSULE ORAL 2 TIMES DAILY
Qty: 6 CAPSULE | Refills: 0 | Status: SHIPPED | OUTPATIENT
Start: 2024-02-14 | End: 2024-02-17

## 2024-02-14 RX ORDER — OXYCODONE HYDROCHLORIDE 5 MG/1
5 TABLET ORAL EVERY 6 HOURS PRN
Status: DISCONTINUED | OUTPATIENT
Start: 2024-02-14 | End: 2024-02-14 | Stop reason: HOSPADM

## 2024-02-14 RX ADMIN — METHYLPREDNISOLONE SODIUM SUCCINATE 40 MG: 40 INJECTION, POWDER, FOR SOLUTION INTRAMUSCULAR; INTRAVENOUS at 06:23

## 2024-02-14 RX ADMIN — FLUTICASONE FUROATE AND VILANTEROL TRIFENATATE 1 PUFF: 200; 25 POWDER RESPIRATORY (INHALATION) at 09:19

## 2024-02-14 RX ADMIN — TIOTROPIUM BROMIDE INHALATION SPRAY 2 PUFF: 3.12 SPRAY, METERED RESPIRATORY (INHALATION) at 09:19

## 2024-02-14 RX ADMIN — ISOSORBIDE MONONITRATE 30 MG: 30 TABLET, EXTENDED RELEASE ORAL at 09:19

## 2024-02-14 RX ADMIN — DOXYCYCLINE 100 MG: 100 CAPSULE ORAL at 09:19

## 2024-02-14 RX ADMIN — FUROSEMIDE 20 MG: 20 TABLET ORAL at 09:19

## 2024-02-14 RX ADMIN — APIXABAN 5 MG: 5 TABLET, FILM COATED ORAL at 09:19

## 2024-02-14 RX ADMIN — GUAIFENESIN 600 MG: 600 TABLET ORAL at 09:23

## 2024-02-14 RX ADMIN — METOPROLOL TARTRATE 50 MG: 50 TABLET, FILM COATED ORAL at 09:19

## 2024-02-14 ASSESSMENT — COGNITIVE AND FUNCTIONAL STATUS - GENERAL
DAILY ACTIVITIY SCORE: 24
CLIMB 3 TO 5 STEPS WITH RAILING: A LITTLE
MOBILITY SCORE: 23

## 2024-02-14 NOTE — DISCHARGE SUMMARY
Discharge Diagnosis  COPD exacerbation (CMS/HCC) leading to acute chronic hypoxic/hypercarbic respiratory failure  Adenocarcinoma of left lung      This discharge took greater than 35 minutes.    Test Results Pending At Discharge  Pending Labs       No current pending labs.            Hospital Course   Abigail Aquino is a 78 y.o. 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  Patient states that she has pulse oximetry at home that will drop into the 70s with ambulation and takes approximately 10 minutes to regain her breath.  She denies having any chest pain with this.  Denies having any fevers or chills patient does have moisture to her cough but does not generate sputum.  Patient has noted some mild swelling of the legs.  Denies any fevers or chills no nausea no vomiting.  Patient states that she is very frustrated with her present level of function as she gets quite dyspneic walking from room to room.  She feels she is not being heard by her doctors who are so focused on her malignancy.  Patient states that her primary goal is quality of life not quantity of life.  She states her ex- is dying from metastatic prostate cancer and she would like to go see him in [place] before he expires.  In the emergency room patient is able to talk in pretty full sentences is wearing 3 L nasal cannula O2 support with pulse ox of 91% at rest.  Blood pressure is 116/69 pulse is 84 respiratory rate is only 16 patient sats 97% on 3 L nasal cannula.  BMP has a glucose of 153 bicarb of 36.  LFTs within normal limits.  Patient's BNP is 249 troponin is 7.  White count is 9100 CBC is otherwise unremarkable.  Influenza A and influenza B are not detected RSV is not detected and coronavirus is not  detected venous gas shows pH of 7.38 with a pCO2 of 69 venous lactic acid is 1.1.  Chest x-ray shows no acute cardiopulmonary disease soft tissue density mass in the left lower lobe seen on CT scan imaging is not well-seen in the AP view of the chest.  EKG shows sinus rhythm of 66 patient has Q waves septally that are old no ST segment elevations  Patient is being admitted to the hospital for acute exacerbation of COPD with persistent personal dyspnea.  We will cycle cardiac enzymes.  Patient will have pulmonary as well as cardiology consultation     2/10: Patient was seen and examined, feeling better, shortness of breath is improving still has cough and unable to get things out.  No fever or chills.     2/11: Patient was evaluated this morning, no acute event overnight, continues to have cough and shortness of breath, no fever or chills.  2/12: Patient continues to have cough, also complains of shortness of breath worse with exertion.  No fever or chills.  2/13: Patient is feeling a bit better today cough and shortness of breath is improving, no fever or chills.  2/14: Patient will be discharged home on tapering dose of prednisone as well as oral antibiotics for few more days.  She will follow-up with PCP and pulmonology as an outpatient.    Pertinent Physical Exam At Time of Discharge  Physical Exam  Patient is awake and orient, not in apparent distress  Eyes: PERRLA, no conjunctival congestion  Chest: Bilateral decrease Air entry, no crackles or wheezing  Heart: s1S2 regular, no murmur  Abdomen: Soft, non tender, BS present  Ext:    Home Medications     Medication List      START taking these medications     doxycycline 100 mg capsule; Commonly known as: Vibramycin; Take 1   capsule (100 mg) by mouth 2 times a day for 3 days. Take with at least 8   ounces (large glass) of water, do not lie down for 30 minutes after   predniSONE 20 mg tablet; Commonly known as: Deltasone; Take 3 tablets   (60 mg) by mouth once  daily for 3 days, THEN 2 tablets (40 mg) once daily   for 3 days, THEN 1 tablet (20 mg) once daily for 3 days, THEN 0.5 tablets   (10 mg) once daily for 3 days.; Start taking on: February 14, 2024     CONTINUE taking these medications     albuterol 90 mcg/actuation inhaler; INHALE 2 PUFFS BY MOUTH FOUR TIMES A   DAY AS NEEDED FOR SHORTNESS OF BREATH   apixaban 5 mg (74 tabs) tablet; Commonly known as: Eliquis; Take 2   tablets (10 mg) by mouth 2 times a day for 7 days, then take 1 tablet (5   mg) by mouth 2 times a day.   Breztri Aerosphere 160-9-4.8 mcg/actuation HFA aerosol inhaler; Generic   drug: budesonide-glycopyr-formoterol; Inhale 2 puffs 2 times a day.   budesonide EC 3 mg 24 hr capsule; Commonly known as: Entocort EC   furosemide 20 mg tablet; Commonly known as: Lasix; TAKE 1 TABLET BY   MOUTH ONCE DAILY   hydrOXYzine HCL 25 mg tablet; Commonly known as: Atarax; Take 1 tablet a   night before your PET scan and another tablet on the morning of your PET   scan.   ipratropium-albuteroL 0.5-2.5 mg/3 mL nebulizer solution; Commonly known   as: Duo-Neb; Take 3 mL by nebulization 4 times a day as needed for   wheezing or shortness of breath.   isosorbide mononitrate ER 30 mg 24 hr tablet; Commonly known as: Imdur   metoprolol tartrate 50 mg tablet; Commonly known as: Lopressor   pravastatin 40 mg tablet; Commonly known as: Pravachol       Outpatient Follow-Up  No follow-ups on file.     Whitney Curran MD  2/14/2024  11:34 AM

## 2024-02-14 NOTE — PROGRESS NOTES
Patient is discharging home today. I spoke with Bat[facility] at [facility] and updated her with patient's discharge so that [facility] can go to patient's home and assist with setting up her POC.

## 2024-02-14 NOTE — TELEPHONE ENCOUNTER
"SW received a call from patient.  Patient stated that she was in the hospital for 5 days and just got home today.  Patient stated that she is more concerned about her quality of life.  Patient stated \"my body is going to let me down before the tumor\".  Patient stated that \"I want to live independently and take care of myself and live a normal life\".  Patient stated \"I don't give a damn and I want all my appointment cancelled today\".  Patient stated that she knows her providers are going to be upset.  SW provided support and active listening.  SW encouraged patient to reach out to SW if she would need anything.  SENG provided information to Dr. Wooten's nurse.      Johnathan Jackson MSW, LSW   "

## 2024-02-14 NOTE — NURSING NOTE
Patient discharged with home going instructions. Medications were reviewed and instructions given. Patient verbalizes understanding of instructions. Follow up appointments that need to be made reviewed with patient. Prescriptions sent to pharmacy of choice. IV removed. Ambulatory and stable at time of discharge. Discharged to home. No questions or concerns voiced. Patient's questions were answered. Patient transported by friend. Patient declined to use own oxygen from home stated that she doesn't use them like that and is not in need from room to friends car. I educated patient on the importance of using an oxygen tank and she said she understood but she would be fine without to get home.

## 2024-02-14 NOTE — CARE PLAN
The clinical goals for the shift include no falls or injury      Problem: Pain  Goal: My pain/discomfort is manageable  Outcome: Progressing     Problem: Safety  Goal: Patient will be injury free during hospitalization  Outcome: Progressing  Goal: I will remain free of falls  Outcome: Progressing     Problem: Daily Care  Goal: Daily care needs are met  Outcome: Progressing     Problem: Psychosocial Needs  Goal: Demonstrates ability to cope with hospitalization/illness  Outcome: Progressing  Goal: Collaborate with me, my family, and caregiver to identify my specific goals  Outcome: Progressing     Problem: Discharge Barriers  Goal: My discharge needs are met  Outcome: Progressing     Problem: Respiratory  Goal: Clear secretions with interventions this shift  Outcome: Progressing  Goal: Minimize anxiety/maximize coping throughout shift  Outcome: Progressing  Goal: Minimal/no exertional discomfort or dyspnea this shift  Outcome: Progressing  Goal: No signs of respiratory distress (eg. Use of accessory muscles. Peds grunting)  Outcome: Progressing  Goal: Patent airway maintained this shift  Outcome: Progressing  Goal: Tolerate mechanical ventilation evidenced by VS/agitation level this shift  Outcome: Progressing  Goal: Tolerate pulmonary toileting this shift  Outcome: Progressing  Goal: Verbalize decreased shortness of breath this shift  Outcome: Progressing  Goal: Wean oxygen to maintain O2 saturation per order/standard this shift  Outcome: Progressing  Goal: Increase self care and/or family involvement in next 24 hours  Outcome: Progressing     Problem: Discharge Planning  Goal: Discharge to home or other facility with appropriate resources  Outcome: Progressing     Problem: Chronic Conditions and Co-morbidities  Goal: Patient's chronic conditions and co-morbidity symptoms are monitored and maintained or improved  Outcome: Progressing

## 2024-02-15 ENCOUNTER — DOCUMENTATION (OUTPATIENT)
Dept: PRIMARY CARE | Facility: CLINIC | Age: 79
End: 2024-02-15
Payer: COMMERCIAL

## 2024-02-15 ENCOUNTER — PATIENT OUTREACH (OUTPATIENT)
Dept: PRIMARY CARE | Facility: CLINIC | Age: 79
End: 2024-02-15
Payer: COMMERCIAL

## 2024-02-15 PROBLEM — J44.1 COPD EXACERBATION (MULTI): Status: RESOLVED | Noted: 2024-02-09 | Resolved: 2024-02-15

## 2024-02-15 NOTE — PROGRESS NOTES
Discharge facility: [facility]  Discharge diagnosis:   COPD exacerbation (CMS/HCC) leading to acute chronic hypoxic/hypercarbic respiratory failure  Adenocarcinoma of left lung    Admission date: 2-10-24  Discharge date: 2-14-24    PCP Appointment Date: Declines to schedule due to feeling overwhelmed. Message to office  Specialist Appointment Date: none noted  Hospital Encounter and Summary: Linked    See Discharge assessment below for further details    Engagement  Call Start Time: 0920 (2/15/2024  9:31 AM)    Medications  Medications reviewed with patient/caregiver?: Yes (2/15/2024  9:31 AM)  Is the patient having any side effects they believe may be caused by any medication additions or changes?: No (2/15/2024  9:31 AM)  Does the patient have all medications ordered at discharge?: Yes (2/15/2024  9:31 AM)  Care Management Interventions: Provided patient education (2/15/2024  9:31 AM)  Is the patient taking all medications as directed (includes completed medication regime)?: Yes (2/15/2024  9:31 AM)  Care Management Interventions: Provided patient education (2/15/2024  9:31 AM)  Medication Comments: Instructed on new medications START taking these medications     doxycycline 100 mg capsule; Commonly known as: Vibramycin; Take 1   capsule (100 mg) by mouth 2 times a day for 3 days. Take with at least 8   ounces (large glass) of water, do not lie down for 30 minutes after   predniSONE 20 mg tablet; Commonly known as: Deltasone; Take 3 tablets   (60 mg) by mouth once daily for 3 days, THEN 2 tablets (40 mg) once daily   for 3 days, THEN 1 tablet (20 mg) once daily for 3 days, THEN 0.5 tablets   (10 mg) once daily for 3 days.; (2/15/2024  9:31 AM)  Follow Up Tasks: -- (n/a) (2/15/2024  9:31 AM)    Appointments  Does the patient have a primary care provider?: Yes (2/15/2024  9:31 AM)  Care Management Interventions: Educated patient on importance of making appointment; Advised patient to make appointment  (2/15/2024  9:31 AM)  Has the patient kept scheduled appointments due by today?: Yes (2/15/2024  9:31 AM)  Care Management Interventions: -- (Encouraged patient to schedule appt. She declines scheduling at this time) (2/15/2024  9:31 AM)    Self Management  What is the home health agency?: declines home care, declining healthy at home (2/15/2024  9:31 AM)  Has home health visited the patient within 72 hours of discharge?: Not applicable (2/15/2024  9:31 AM)  Home Health Interventions: -- (n/a) (2/15/2024  9:31 AM)  What Durable Medical Equipment (DME) was ordered?: oxygen, patient reports portable oxygen coming today (2/15/2024  9:31 AM)  Has all Durable Medical Equipment (DME) been delivered?: No (portable oxygen today) (2/15/2024  9:31 AM)  DME Interventions: -- (Patient staes she will handle it if there is an issue) (2/15/2024  9:31 AM)  Care Management Interventions: Notified PCP/provider (2/15/2024  9:31 AM)  Follow Up Tasks: Durable Medical Equipment (DME) (2/15/2024  9:31 AM)    Patient Teaching  Does the patient have access to their discharge instructions?: Yes (2/15/2024  9:31 AM)  Care Management Interventions: Reviewed instructions with patient (2/15/2024  9:31 AM)  What is the patient's perception of their health status since discharge?: Improving (2/15/2024  9:31 AM)  Is the patient/caregiver able to teach back the hierarchy of who to call/visit for symptoms/problems? PCP, Specialist, Home Health nurse, Urgent Care, ED, 911: Yes (2/15/2024  9:31 AM)  If the patient is a current smoker, are they able to teach back resources for cessation?: -- (n/a) (2/15/2024  9:31 AM)  Patient/Caregiver Education Comments: Instructed on discharge instructions from hospital. Instructed if increased shortness of breath or chest pain to call 911. Provided my contact information and encouraged to call with any questions. (2/15/2024  9:31 AM)    Wrap Up  Is the patient/caregiver familiar with Advance Care Planning?: Yes  (2/15/2024  9:31 AM)  Would the patient like more information on Advance Care Planning?: No (2/15/2024  9:31 AM)  Wrap Up Additional Comments: TCM call to patient. She is very frustated. She feels everyone is more concerned about the tumor instead of her current breathing issues with the COPD. She declines scheduling post op appt at this time. She states she is very overwhelmed. She does reports a  coming to her home today but she is uncertain where she is coming from. She stated right now she wants to focus on getting herself back to feeling better and having a better quality of ADL/IADLs in her life and then she will schedule the rest. Patient feels as though she is being pressured at times. Advised her that although it may seem that way, Dr/nurses just want to make sure she is aware and understands potential outcomes if proper treatment is not received. She staed she understands. (2/15/2024  9:31 AM)  Call End Time: 0942 (2/15/2024  9:31 AM)

## 2024-02-19 ENCOUNTER — APPOINTMENT (OUTPATIENT)
Dept: PULMONOLOGY | Facility: CLINIC | Age: 79
End: 2024-02-19
Payer: COMMERCIAL

## 2024-02-21 ENCOUNTER — HOSPITAL ENCOUNTER (OUTPATIENT)
Dept: RADIATION ONCOLOGY | Facility: HOSPITAL | Age: 79
Setting detail: RADIATION/ONCOLOGY SERIES
End: 2024-02-21

## 2024-02-22 DIAGNOSIS — I50.9 HEART FAILURE, UNSPECIFIED HF CHRONICITY, UNSPECIFIED HEART FAILURE TYPE (MULTI): Primary | ICD-10-CM

## 2024-02-22 DIAGNOSIS — J43.9 PULMONARY EMPHYSEMA, UNSPECIFIED EMPHYSEMA TYPE (MULTI): ICD-10-CM

## 2024-02-26 ENCOUNTER — APPOINTMENT (OUTPATIENT)
Dept: GASTROENTEROLOGY | Facility: HOSPITAL | Age: 79
End: 2024-02-26
Payer: COMMERCIAL

## 2024-02-29 ENCOUNTER — PATIENT OUTREACH (OUTPATIENT)
Dept: PRIMARY CARE | Facility: CLINIC | Age: 79
End: 2024-02-29
Payer: COMMERCIAL

## 2024-02-29 NOTE — PROGRESS NOTES
Unable to reach patient for call back after patient's follow up appointment with PCP.   LVM with call back number for patient to call if needed

## 2024-03-04 ENCOUNTER — OFFICE VISIT (OUTPATIENT)
Dept: PRIMARY CARE | Facility: CLINIC | Age: 79
End: 2024-03-04
Payer: COMMERCIAL

## 2024-03-04 VITALS
WEIGHT: 124.4 LBS | DIASTOLIC BLOOD PRESSURE: 79 MMHG | OXYGEN SATURATION: 89 % | HEIGHT: 63 IN | TEMPERATURE: 97.4 F | RESPIRATION RATE: 16 BRPM | HEART RATE: 78 BPM | SYSTOLIC BLOOD PRESSURE: 124 MMHG | BODY MASS INDEX: 22.04 KG/M2

## 2024-03-04 DIAGNOSIS — H61.23 IMPACTED CERUMEN, BILATERAL: Primary | ICD-10-CM

## 2024-03-04 PROBLEM — R54 AGE-RELATED PHYSICAL DEBILITY: Status: ACTIVE | Noted: 2023-08-30

## 2024-03-04 PROBLEM — I11.0 HYPERTENSIVE HEART DISEASE WITH HEART FAILURE (MULTI): Status: ACTIVE | Noted: 2023-08-27

## 2024-03-04 PROBLEM — E78.2 MIXED HYPERLIPIDEMIA: Status: ACTIVE | Noted: 2021-10-11

## 2024-03-04 PROBLEM — Z79.01 LONG TERM (CURRENT) USE OF ANTICOAGULANTS: Status: ACTIVE | Noted: 2023-09-05

## 2024-03-04 PROBLEM — J96.11 CHRONIC RESPIRATORY FAILURE WITH HYPOXIA (MULTI): Status: ACTIVE | Noted: 2023-08-30

## 2024-03-04 PROBLEM — E03.5 MYXEDEMA COMA (MULTI): Status: RESOLVED | Noted: 2023-12-15 | Resolved: 2024-03-04

## 2024-03-04 PROBLEM — R91.1 SOLITARY PULMONARY NODULE: Status: ACTIVE | Noted: 2023-09-06

## 2024-03-04 PROBLEM — M81.0 OSTEOPOROSIS: Status: ACTIVE | Noted: 2024-03-04

## 2024-03-04 PROBLEM — F17.200 NICOTINE DEPENDENCE: Status: ACTIVE | Noted: 2023-09-06

## 2024-03-04 PROCEDURE — 1126F AMNT PAIN NOTED NONE PRSNT: CPT | Performed by: FAMILY MEDICINE

## 2024-03-04 PROCEDURE — 1036F TOBACCO NON-USER: CPT | Performed by: FAMILY MEDICINE

## 2024-03-04 PROCEDURE — 3078F DIAST BP <80 MM HG: CPT | Performed by: FAMILY MEDICINE

## 2024-03-04 PROCEDURE — 1111F DSCHRG MED/CURRENT MED MERGE: CPT | Performed by: FAMILY MEDICINE

## 2024-03-04 PROCEDURE — 3074F SYST BP LT 130 MM HG: CPT | Performed by: FAMILY MEDICINE

## 2024-03-04 PROCEDURE — 69209 REMOVE IMPACTED EAR WAX UNI: CPT | Performed by: FAMILY MEDICINE

## 2024-03-04 PROCEDURE — 1124F ACP DISCUSS-NO DSCNMKR DOCD: CPT | Performed by: FAMILY MEDICINE

## 2024-03-04 PROCEDURE — 99213 OFFICE O/P EST LOW 20 MIN: CPT | Performed by: FAMILY MEDICINE

## 2024-03-04 PROCEDURE — 1159F MED LIST DOCD IN RCRD: CPT | Performed by: FAMILY MEDICINE

## 2024-03-04 RX ORDER — FLUTICASONE FUROATE AND VILANTEROL 200; 25 UG/1; UG/1
1 POWDER RESPIRATORY (INHALATION)
COMMUNITY
Start: 2023-12-14

## 2024-03-04 ASSESSMENT — ENCOUNTER SYMPTOMS
WHEEZING: 1
SHORTNESS OF BREATH: 1
PALPITATIONS: 0

## 2024-03-04 NOTE — PROGRESS NOTES
Patient ID: Abigail Aquino is a 79 y.o. female.    Ear Cerumen Removal    Date/Time: 3/4/2024 6:35 PM    Performed by: Anjelica Juan MA  Authorized by: Vikki Marquez MD    Consent:     Consent obtained:  Verbal    Consent given by:  Patient    Risks, benefits, and alternatives were discussed: yes    Universal protocol:     Procedure explained and questions answered to patient or proxy's satisfaction: yes      Relevant documents present and verified: yes      Test results available: yes      Imaging studies available: yes      Required blood products, implants, devices, and special equipment available: yes      Site/side marked: yes      Immediately prior to procedure, a time out was called: yes      Patient identity confirmed:  Verbally with patient  Procedure details:     Location:  L ear and R ear    Procedure type: irrigation      Procedure outcomes: cerumen removed    Post-procedure details:     Inspection:  Ear canal clear    Procedure completion:  Tolerated well, no immediate complications

## 2024-03-04 NOTE — PROGRESS NOTES
Subjective   Patient ID: Abigail Aquino is a 79 y.o. female who presents for Establish Care (Ear irrigation , patient got new hearing aid ).    She needs to get wax removed from ears so can schedule hearing test to get new hearing aids. Her hearing aids are not doing well due to wax.     She was recently in hospital for difficulty breathing. Was feeling good when left hospital. Not feeling as well now but not bad. She is feeling that she does not have follow up soon enough with pulmonary. She is interested in maybe changing to another pulmonary doctor that se can get more follow up with.              Current Outpatient Medications:     albuterol 90 mcg/actuation inhaler, INHALE 2 PUFFS BY MOUTH FOUR TIMES A DAY AS NEEDED FOR SHORTNESS OF BREATH, Disp: 18 g, Rfl: 1    apixaban (Eliquis) 5 mg (74 tabs) tablet, Take 2 tablets (10 mg) by mouth 2 times a day for 7 days, then take 1 tablet (5 mg) by mouth 2 times a day., Disp: 74 tablet, Rfl: 0    budesonide EC (Entocort EC) 3 mg 24 hr capsule, Take 1 capsule (3 mg) by mouth once daily in the morning., Disp: , Rfl:     budesonide-glycopyr-formoterol (Breztri Aerosphere) 160-9-4.8 mcg/actuation HFA aerosol inhaler, Inhale 2 puffs 2 times a day., Disp: 24 g, Rfl: 11    fluticasone furoate-vilanteroL (Breo Ellipta) 200-25 mcg/dose inhaler, Inhale 1 puff once daily., Disp: , Rfl:     furosemide (Lasix) 20 mg tablet, TAKE 1 TABLET BY MOUTH ONCE DAILY, Disp: 90 tablet, Rfl: 0    ipratropium-albuteroL (Duo-Neb) 0.5-2.5 mg/3 mL nebulizer solution, Take 3 mL by nebulization 4 times a day as needed for wheezing or shortness of breath., Disp: 360 mL, Rfl: 11    isosorbide mononitrate ER (Imdur) 30 mg 24 hr tablet, Take 1 tablet (30 mg) by mouth once daily., Disp: , Rfl:     metoprolol tartrate (Lopressor) 50 mg tablet, Take 1 tablet by mouth 2 times a day., Disp: , Rfl:     pravastatin (Pravachol) 40 mg tablet, Take 1 tablet (40 mg) by mouth once daily., Disp: , Rfl:      "tiotropium (Spiriva Respimat) 2.5 mcg/actuation inhaler, Inhale 2 puffs once daily., Disp: , Rfl:     hydrOXYzine HCL (Atarax) 25 mg tablet, Take 1 tablet a night before your PET scan and another tablet on the morning of your PET scan. (Patient not taking: Reported on 3/4/2024), Disp: 2 tablet, Rfl: 2    Patient Active Problem List   Diagnosis    Atherosclerotic heart disease of native coronary artery without angina pectoris    Chronic obstructive pulmonary disease, unspecified (CMS/HCC)    Difficulty in walking, not elsewhere classified    Essential (primary) hypertension    Mixed hyperlipidemia    Ulcerative colitis, unspecified, without complications (CMS/HCC)    Presence of stent in coronary artery    History of pulmonary embolism    Cigarette smoker    Lung nodules    Lymphocytic colitis    Acute pulmonary embolism without acute cor pulmonale (CMS/HCC)    Malignant neoplasm of left lung (CMS/HCC)    Other acute pulmonary embolism, unspecified whether acute cor pulmonale present (CMS/HCC)    Primary cancer of left lower lobe of lung (CMS/HCC)    Nicotine dependence    Chronic respiratory failure with hypoxia (CMS/HCC)    Age-related physical debility    Long term (current) use of anticoagulants    Hypertensive heart disease with heart failure (CMS/HCC)    Osteoporosis    Solitary pulmonary nodule    Impacted cerumen, bilateral         Review of Systems   Respiratory:  Positive for shortness of breath and wheezing.    Cardiovascular:  Negative for chest pain, palpitations and leg swelling.       Objective   /79 (BP Location: Left arm, Patient Position: Sitting, BP Cuff Size: Adult)   Pulse 78   Temp 36.3 °C (97.4 °F)   Resp 16   Ht 1.6 m (5' 3\")   Wt 56.4 kg (124 lb 6.4 oz)   SpO2 (!) 89%   BMI 22.04 kg/m²     Physical Exam  Vitals reviewed.   Constitutional:       General: She is not in acute distress.     Appearance: She is not ill-appearing.   HENT:      Ears:      Comments: Bilateral cerumen " impaction.   Hearing aids were removed for exam.   Neck:      Vascular: No carotid bruit.   Cardiovascular:      Rate and Rhythm: Normal rate and regular rhythm.      Pulses: Normal pulses.      Heart sounds: No murmur heard.  Pulmonary:      Effort: Pulmonary effort is normal.      Breath sounds: Normal breath sounds.   Musculoskeletal:      Left lower leg: No edema.   Skin:     Findings: No rash.   Neurological:      Mental Status: She is alert.   Psychiatric:         Mood and Affect: Mood normal.         Assessment/Plan   Problem List Items Addressed This Visit       Impacted cerumen, bilateral - Primary     Bilateral ear irrigation then is following up with Audiology. Clear after irrigation         Relevant Orders    Ear Cerumen Removal         Assessment, plans, tests, and follow up discussed with patient and patient verbalized understanding. Abigail was given an opportunity to ask questions and  any concerns were addressed including but not limited to need to use ear wax drops to keep clear.

## 2024-03-05 ENCOUNTER — APPOINTMENT (OUTPATIENT)
Dept: CARDIOLOGY | Facility: HOSPITAL | Age: 79
End: 2024-03-05
Payer: COMMERCIAL

## 2024-03-05 ENCOUNTER — APPOINTMENT (OUTPATIENT)
Dept: RADIOLOGY | Facility: HOSPITAL | Age: 79
End: 2024-03-05
Payer: COMMERCIAL

## 2024-03-05 ENCOUNTER — HOSPITAL ENCOUNTER (EMERGENCY)
Facility: HOSPITAL | Age: 79
Discharge: HOME | End: 2024-03-05
Payer: COMMERCIAL

## 2024-03-05 ENCOUNTER — PHARMACY VISIT (OUTPATIENT)
Dept: PHARMACY | Facility: CLINIC | Age: 79
End: 2024-03-05
Payer: COMMERCIAL

## 2024-03-05 VITALS
HEIGHT: 63 IN | DIASTOLIC BLOOD PRESSURE: 65 MMHG | SYSTOLIC BLOOD PRESSURE: 115 MMHG | HEART RATE: 72 BPM | BODY MASS INDEX: 21.97 KG/M2 | TEMPERATURE: 97.1 F | RESPIRATION RATE: 19 BRPM | OXYGEN SATURATION: 99 % | WEIGHT: 124 LBS

## 2024-03-05 DIAGNOSIS — M43.9 COMPRESSION DEFORMITY OF VERTEBRA: Primary | ICD-10-CM

## 2024-03-05 LAB
ALBUMIN SERPL BCP-MCNC: 3.7 G/DL (ref 3.4–5)
ALP SERPL-CCNC: 77 U/L (ref 33–136)
ALT SERPL W P-5'-P-CCNC: 20 U/L (ref 7–45)
ANION GAP SERPL CALC-SCNC: 10 MMOL/L (ref 10–20)
AST SERPL W P-5'-P-CCNC: 17 U/L (ref 9–39)
BASOPHILS # BLD AUTO: 0.03 X10*3/UL (ref 0–0.1)
BASOPHILS NFR BLD AUTO: 0.3 %
BILIRUB SERPL-MCNC: 0.9 MG/DL (ref 0–1.2)
BUN SERPL-MCNC: 10 MG/DL (ref 6–23)
CALCIUM SERPL-MCNC: 8.9 MG/DL (ref 8.6–10.3)
CARDIAC TROPONIN I PNL SERPL HS: 6 NG/L (ref 0–13)
CARDIAC TROPONIN I PNL SERPL HS: 6 NG/L (ref 0–13)
CHLORIDE SERPL-SCNC: 104 MMOL/L (ref 98–107)
CO2 SERPL-SCNC: 28 MMOL/L (ref 21–32)
CREAT SERPL-MCNC: 0.58 MG/DL (ref 0.5–1.05)
EGFRCR SERPLBLD CKD-EPI 2021: >90 ML/MIN/1.73M*2
EOSINOPHIL # BLD AUTO: 0.03 X10*3/UL (ref 0–0.4)
EOSINOPHIL NFR BLD AUTO: 0.3 %
ERYTHROCYTE [DISTWIDTH] IN BLOOD BY AUTOMATED COUNT: 13.4 % (ref 11.5–14.5)
GLUCOSE SERPL-MCNC: 108 MG/DL (ref 74–99)
HCT VFR BLD AUTO: 44 % (ref 36–46)
HGB BLD-MCNC: 14.8 G/DL (ref 12–16)
IMM GRANULOCYTES # BLD AUTO: 0.08 X10*3/UL (ref 0–0.5)
IMM GRANULOCYTES NFR BLD AUTO: 0.7 % (ref 0–0.9)
LYMPHOCYTES # BLD AUTO: 0.77 X10*3/UL (ref 0.8–3)
LYMPHOCYTES NFR BLD AUTO: 6.5 %
MCH RBC QN AUTO: 33.1 PG (ref 26–34)
MCHC RBC AUTO-ENTMCNC: 33.6 G/DL (ref 32–36)
MCV RBC AUTO: 98 FL (ref 80–100)
MONOCYTES # BLD AUTO: 0.43 X10*3/UL (ref 0.05–0.8)
MONOCYTES NFR BLD AUTO: 3.6 %
NEUTROPHILS # BLD AUTO: 10.49 X10*3/UL (ref 1.6–5.5)
NEUTROPHILS NFR BLD AUTO: 88.6 %
NRBC BLD-RTO: 0 /100 WBCS (ref 0–0)
PLATELET # BLD AUTO: 169 X10*3/UL (ref 150–450)
POTASSIUM SERPL-SCNC: 4.4 MMOL/L (ref 3.5–5.3)
PROT SERPL-MCNC: 6.4 G/DL (ref 6.4–8.2)
RBC # BLD AUTO: 4.47 X10*6/UL (ref 4–5.2)
SODIUM SERPL-SCNC: 138 MMOL/L (ref 136–145)
WBC # BLD AUTO: 11.8 X10*3/UL (ref 4.4–11.3)

## 2024-03-05 PROCEDURE — 36415 COLL VENOUS BLD VENIPUNCTURE: CPT | Performed by: NURSE PRACTITIONER

## 2024-03-05 PROCEDURE — 84075 ASSAY ALKALINE PHOSPHATASE: CPT | Performed by: NURSE PRACTITIONER

## 2024-03-05 PROCEDURE — 96375 TX/PRO/DX INJ NEW DRUG ADDON: CPT

## 2024-03-05 PROCEDURE — 84484 ASSAY OF TROPONIN QUANT: CPT | Performed by: NURSE PRACTITIONER

## 2024-03-05 PROCEDURE — 96361 HYDRATE IV INFUSION ADD-ON: CPT

## 2024-03-05 PROCEDURE — 2500000005 HC RX 250 GENERAL PHARMACY W/O HCPCS: Performed by: NURSE PRACTITIONER

## 2024-03-05 PROCEDURE — 96374 THER/PROPH/DIAG INJ IV PUSH: CPT

## 2024-03-05 PROCEDURE — 72100 X-RAY EXAM L-S SPINE 2/3 VWS: CPT

## 2024-03-05 PROCEDURE — RXMED WILLOW AMBULATORY MEDICATION CHARGE

## 2024-03-05 PROCEDURE — 93005 ELECTROCARDIOGRAM TRACING: CPT

## 2024-03-05 PROCEDURE — 85025 COMPLETE CBC W/AUTO DIFF WBC: CPT | Performed by: NURSE PRACTITIONER

## 2024-03-05 PROCEDURE — 72100 X-RAY EXAM L-S SPINE 2/3 VWS: CPT | Performed by: RADIOLOGY

## 2024-03-05 PROCEDURE — 2500000004 HC RX 250 GENERAL PHARMACY W/ HCPCS (ALT 636 FOR OP/ED): Performed by: NURSE PRACTITIONER

## 2024-03-05 PROCEDURE — 99284 EMERGENCY DEPT VISIT MOD MDM: CPT | Mod: 25

## 2024-03-05 RX ORDER — TRAMADOL HYDROCHLORIDE 50 MG/1
50 TABLET ORAL EVERY 6 HOURS PRN
Qty: 10 TABLET | Refills: 0 | Status: SHIPPED | OUTPATIENT
Start: 2024-03-05 | End: 2024-03-08

## 2024-03-05 RX ORDER — KETOROLAC TROMETHAMINE 30 MG/ML
15 INJECTION, SOLUTION INTRAMUSCULAR; INTRAVENOUS ONCE
Status: DISCONTINUED | OUTPATIENT
Start: 2024-03-05 | End: 2024-03-05

## 2024-03-05 RX ORDER — ONDANSETRON HYDROCHLORIDE 2 MG/ML
4 INJECTION, SOLUTION INTRAVENOUS ONCE
Status: COMPLETED | OUTPATIENT
Start: 2024-03-05 | End: 2024-03-05

## 2024-03-05 RX ORDER — ORPHENADRINE CITRATE 30 MG/ML
30 INJECTION INTRAMUSCULAR; INTRAVENOUS ONCE
Status: DISCONTINUED | OUTPATIENT
Start: 2024-03-05 | End: 2024-03-05

## 2024-03-05 RX ORDER — LIDOCAINE 560 MG/1
1 PATCH PERCUTANEOUS; TOPICAL; TRANSDERMAL ONCE
Status: DISCONTINUED | OUTPATIENT
Start: 2024-03-05 | End: 2024-03-05 | Stop reason: HOSPADM

## 2024-03-05 RX ORDER — KETOROLAC TROMETHAMINE 30 MG/ML
15 INJECTION, SOLUTION INTRAMUSCULAR; INTRAVENOUS ONCE
Status: COMPLETED | OUTPATIENT
Start: 2024-03-05 | End: 2024-03-05

## 2024-03-05 RX ORDER — MORPHINE SULFATE 2 MG/ML
2 INJECTION, SOLUTION INTRAMUSCULAR; INTRAVENOUS ONCE
Status: COMPLETED | OUTPATIENT
Start: 2024-03-05 | End: 2024-03-05

## 2024-03-05 RX ORDER — ORPHENADRINE CITRATE 30 MG/ML
30 INJECTION INTRAMUSCULAR; INTRAVENOUS ONCE
Status: COMPLETED | OUTPATIENT
Start: 2024-03-05 | End: 2024-03-05

## 2024-03-05 RX ORDER — LIDOCAINE 50 MG/G
1 PATCH TOPICAL DAILY
Qty: 10 PATCH | Refills: 0 | Status: SHIPPED | OUTPATIENT
Start: 2024-03-05

## 2024-03-05 RX ADMIN — ORPHENADRINE CITRATE 30 MG: 60 INJECTION INTRAMUSCULAR; INTRAVENOUS at 15:09

## 2024-03-05 RX ADMIN — SODIUM CHLORIDE 500 ML: 9 INJECTION, SOLUTION INTRAVENOUS at 15:11

## 2024-03-05 RX ADMIN — MORPHINE SULFATE 2 MG: 2 INJECTION, SOLUTION INTRAMUSCULAR; INTRAVENOUS at 16:47

## 2024-03-05 RX ADMIN — LIDOCAINE 1 PATCH: 4 PATCH TOPICAL at 15:41

## 2024-03-05 RX ADMIN — ONDANSETRON 4 MG: 2 INJECTION INTRAMUSCULAR; INTRAVENOUS at 16:47

## 2024-03-05 RX ADMIN — KETOROLAC TROMETHAMINE 15 MG: 30 INJECTION, SOLUTION INTRAMUSCULAR; INTRAVENOUS at 15:10

## 2024-03-05 NOTE — ED PROVIDER NOTES
"HPI   Chief Complaint   Patient presents with    Back Pain     Was opening a window when she heard a \"snap\" lower back below the waist. Was able to walk after the snap.     Dehydration    Dizziness    Nausea       This is a 79-year-old  female, with a history of COPD, hypertension, and hyperlipidemia, that is presenting to the emergency room with multiple medical complaints.  The patient was attempting to open a window on her patio when she felt and heard a large pop in her back.  She had shooting pain in the middle of her back that radiated to her stomach.  The patient reports that she sat down to collect her self.  After a few moments, the patient was able to get up and she took Tylenol.  She did not have any improvement of her symptoms.  The patient states that she started to feel very lightheaded.  She states that this has happened before when she was dehydrated.  The patient states she is not having any chest pain, shortness of breath, palpitations, paresthesias, focal weakness.  She denies any alteration in her urine or bowel function.  She had not been experiencing any abdominal pain prior to injuring her back.  She denies any fever or cold-like symptoms.  She states she has been eating and drinking appropriately.  The patient is on diuretics.      History provided by:  Patient   used: No                        [place] Coma Scale Score: 15                     Patient History   Past Medical History:   Diagnosis Date    Clotting disorder (CMS/[facility])     COPD (chronic obstructive pulmonary disease) (CMS/[facility])     Eyebrow laceration, right, sequela 10/04/2023    Fracture tibia/fibula, right, sequela 10/11/2021    Hypertension     Kidney stones 05/09/2017     Past Surgical History:   Procedure Laterality Date    APPENDECTOMY      CT GUIDED PERCUTANEOUS BIOPSY LUNG  12/21/2023    CT GUIDED PERCUTANEOUS BIOPSY LUNG 12/21/2023 P[facility] CT    TONSILLECTOMY       Family History   Problem Relation " Name Age of Onset    Cancer Daughter       Social History     Tobacco Use    Smoking status: Former     Types: Cigarettes     Quit date: 2023     Years since quittin.5     Passive exposure: Past    Smokeless tobacco: Never   Vaping Use    Vaping Use: Never used   Substance Use Topics    Alcohol use: Never    Drug use: Never       Physical Exam   ED Triage Vitals [24 1302]   Temperature Heart Rate Respirations BP   36.2 °C (97.1 °F) 55 20 92/61      Pulse Ox Temp Source Heart Rate Source Patient Position   95 % Temporal Monitor Sitting      BP Location FiO2 (%)     Left arm --       Physical Exam    ED Course & MDM   ED Course as of 03/05/24 2103   Tue Mar 05, 2024   1530 ECG 12 lead  Twelve-lead interpreted by me.  Sinus rhythm at a rate of 65, IA interval 136, QRS of 111, , QTc is 449.  Normal axis.  Low voltage in extremity and precordial leads.  Multiple PVCs.  No acute ischemia or injury pattern noted. [CT]      ED Course User Index  [CT] Liliana Cabrera, APRN-CNP         Diagnoses as of 24   Compression deformity of vertebra       Medical Decision Making  Patient was seen and evaluated by the nurse practitioner, Liliana Cabrera.  The patient is presenting to the emergency room with complaints of back pain and concern for cardiac issues.  She is not having any chest pain.  We looked at the prehospital EKG and there was no evidence of a STEMI.  We repeated the twelve-lead EKG and it showed normal sinus rhythm with occasional PACs and PVCs.  We did obtain blood work and her initial and delta troponin remained unchanged at 6.  The remainder of her labs were unremarkable.  Specifically an x-ray of the lumbar spine was obtained and showed a newly seen moderate superior L1 compressive deformity.  The patient was medicated withToradol 15 mg IV, Norflex 30 mg IV, and a lidocaine patch was placed on the area of pain.  She reported some improvement her symptoms but states that her pain was  still there with movement.  She was further medicated with morphine 4 mg IVP and Zofran 4 mg IVP.  She was also given a 500 mL normal saline bolus.  Repeat vital signs were obtained and her blood pressure was stable at 115/65.  The patient was not tachycardic.  The patient was able to ambulate to the bathroom without pain after being medicated.  We feel the patient to be discharged at this time.  She was provided a prescription for Ultram and Lidoderm patches.  She was educated on ice therapy and is to follow-up with Dr. Raya in 2 to 3 days.  The patient was discharged in stable condition with computer discharge instructions given.  The patient was agreeable with discharge planning.        Procedure  Procedures     VAL Cui-MANI  03/05/24 5957

## 2024-03-08 ENCOUNTER — TELEPHONE (OUTPATIENT)
Dept: PRIMARY CARE | Facility: CLINIC | Age: 79
End: 2024-03-08
Payer: COMMERCIAL

## 2024-03-08 NOTE — TELEPHONE ENCOUNTER
Patient wrenched her back on 3/5. She went to the ED xray was performed.  They prescribed lidocaine patches.    She is calling for an oral pain medication. Tramadol makes her nauseated    Please advise.

## 2024-03-16 LAB
ATRIAL RATE: 65 BPM
P AXIS: 64 DEGREES
PR INTERVAL: 136 MS
Q ONSET: 249 MS
QRS COUNT: 12 BEATS
QRS DURATION: 111 MS
QT INTERVAL: 431 MS
QTC CALCULATION(BAZETT): 449 MS
QTC FREDERICIA: 442 MS
R AXIS: 49 DEGREES
T AXIS: 37 DEGREES
T OFFSET: 465 MS
VENTRICULAR RATE: 65 BPM

## 2024-03-27 ENCOUNTER — PATIENT OUTREACH (OUTPATIENT)
Dept: PRIMARY CARE | Facility: CLINIC | Age: 79
End: 2024-03-27
Payer: COMMERCIAL

## 2024-03-27 NOTE — PROGRESS NOTES
Follow up call to patient. She reports she went to ER for her back and was prescribed enough medications for 10 days. She was upset her PCP will not call in a prescription for pain pills. Instructed that Dr needs to see her before she can prescribe her medications like that. She stated she can not get out of her house and down the stairs. Informed her of possibly having a Dr or Np to come to her home. She stated she does not know what to say. She continued on to report previous experiences with hospitals and doctors and misdiagnosis of having cellulitis verses CHF. She reports that she is currently still hurting with her back and sitting on the heating bad. Encouraged her she will need a follow up with her PCP.

## 2024-04-11 ENCOUNTER — HOSPITAL ENCOUNTER (OUTPATIENT)
Dept: RADIOLOGY | Facility: CLINIC | Age: 79
Discharge: HOME | End: 2024-04-11
Payer: COMMERCIAL

## 2024-04-11 ENCOUNTER — OFFICE VISIT (OUTPATIENT)
Dept: PRIMARY CARE | Facility: CLINIC | Age: 79
End: 2024-04-11
Payer: COMMERCIAL

## 2024-04-11 VITALS
HEIGHT: 63 IN | WEIGHT: 128 LBS | DIASTOLIC BLOOD PRESSURE: 84 MMHG | RESPIRATION RATE: 16 BRPM | BODY MASS INDEX: 22.68 KG/M2 | HEART RATE: 71 BPM | OXYGEN SATURATION: 81 % | TEMPERATURE: 97.1 F | SYSTOLIC BLOOD PRESSURE: 135 MMHG

## 2024-04-11 DIAGNOSIS — K51.80 OTHER ULCERATIVE COLITIS WITHOUT COMPLICATION (MULTI): ICD-10-CM

## 2024-04-11 DIAGNOSIS — C34.32 PRIMARY CANCER OF LEFT LOWER LOBE OF LUNG (MULTI): ICD-10-CM

## 2024-04-11 DIAGNOSIS — S32.010S COMPRESSION FRACTURE OF L1 VERTEBRA, SEQUELA: ICD-10-CM

## 2024-04-11 DIAGNOSIS — J96.11 CHRONIC RESPIRATORY FAILURE WITH HYPOXIA (MULTI): ICD-10-CM

## 2024-04-11 DIAGNOSIS — J43.9 PULMONARY EMPHYSEMA, UNSPECIFIED EMPHYSEMA TYPE (MULTI): ICD-10-CM

## 2024-04-11 DIAGNOSIS — I11.0 HYPERTENSIVE HEART DISEASE WITH HEART FAILURE (MULTI): ICD-10-CM

## 2024-04-11 DIAGNOSIS — Z23 NEED FOR VACCINATION: ICD-10-CM

## 2024-04-11 DIAGNOSIS — R73.09 ELEVATED HEMOGLOBIN A1C: ICD-10-CM

## 2024-04-11 DIAGNOSIS — I25.10 ATHEROSCLEROSIS OF NATIVE CORONARY ARTERY OF NATIVE HEART WITHOUT ANGINA PECTORIS: ICD-10-CM

## 2024-04-11 DIAGNOSIS — I10 ESSENTIAL (PRIMARY) HYPERTENSION: ICD-10-CM

## 2024-04-11 DIAGNOSIS — M80.00XA AGE-RELATED OSTEOPOROSIS WITH CURRENT PATHOLOGICAL FRACTURE, INITIAL ENCOUNTER: ICD-10-CM

## 2024-04-11 DIAGNOSIS — F17.210 CIGARETTE SMOKER: ICD-10-CM

## 2024-04-11 DIAGNOSIS — Z00.00 WELL ADULT EXAM: Primary | ICD-10-CM

## 2024-04-11 DIAGNOSIS — E78.2 MIXED HYPERLIPIDEMIA: ICD-10-CM

## 2024-04-11 DIAGNOSIS — Z86.711 HISTORY OF PULMONARY EMBOLISM: ICD-10-CM

## 2024-04-11 DIAGNOSIS — I26.99 OTHER ACUTE PULMONARY EMBOLISM, UNSPECIFIED WHETHER ACUTE COR PULMONALE PRESENT (MULTI): ICD-10-CM

## 2024-04-11 PROBLEM — H61.23 IMPACTED CERUMEN, BILATERAL: Status: RESOLVED | Noted: 2024-03-04 | Resolved: 2024-04-11

## 2024-04-11 PROBLEM — M48.50XA COMPRESSION FRACTURE OF VERTEBRA (MULTI): Status: ACTIVE | Noted: 2024-04-11

## 2024-04-11 PROBLEM — R91.1 SOLITARY PULMONARY NODULE: Status: RESOLVED | Noted: 2023-09-06 | Resolved: 2024-04-11

## 2024-04-11 PROCEDURE — 1036F TOBACCO NON-USER: CPT | Performed by: FAMILY MEDICINE

## 2024-04-11 PROCEDURE — 1124F ACP DISCUSS-NO DSCNMKR DOCD: CPT | Performed by: FAMILY MEDICINE

## 2024-04-11 PROCEDURE — G0439 PPPS, SUBSEQ VISIT: HCPCS | Performed by: FAMILY MEDICINE

## 2024-04-11 PROCEDURE — 1160F RVW MEDS BY RX/DR IN RCRD: CPT | Performed by: FAMILY MEDICINE

## 2024-04-11 PROCEDURE — 72110 X-RAY EXAM L-2 SPINE 4/>VWS: CPT

## 2024-04-11 PROCEDURE — 72110 X-RAY EXAM L-2 SPINE 4/>VWS: CPT | Performed by: RADIOLOGY

## 2024-04-11 PROCEDURE — 1159F MED LIST DOCD IN RCRD: CPT | Performed by: FAMILY MEDICINE

## 2024-04-11 PROCEDURE — 99213 OFFICE O/P EST LOW 20 MIN: CPT | Performed by: FAMILY MEDICINE

## 2024-04-11 PROCEDURE — 3075F SYST BP GE 130 - 139MM HG: CPT | Performed by: FAMILY MEDICINE

## 2024-04-11 PROCEDURE — 1170F FXNL STATUS ASSESSED: CPT | Performed by: FAMILY MEDICINE

## 2024-04-11 PROCEDURE — 1125F AMNT PAIN NOTED PAIN PRSNT: CPT | Performed by: FAMILY MEDICINE

## 2024-04-11 PROCEDURE — 3079F DIAST BP 80-89 MM HG: CPT | Performed by: FAMILY MEDICINE

## 2024-04-11 RX ORDER — APIXABAN 5 MG/1
5 TABLET, FILM COATED ORAL 2 TIMES DAILY
COMMUNITY
Start: 2024-03-11 | End: 2024-06-09

## 2024-04-11 ASSESSMENT — PAIN SCALES - GENERAL: PAINLEVEL: 4

## 2024-04-11 ASSESSMENT — PATIENT HEALTH QUESTIONNAIRE - PHQ9
2. FEELING DOWN, DEPRESSED OR HOPELESS: NOT AT ALL
1. LITTLE INTEREST OR PLEASURE IN DOING THINGS: NOT AT ALL
SUM OF ALL RESPONSES TO PHQ9 QUESTIONS 1 AND 2: 0

## 2024-04-11 ASSESSMENT — ENCOUNTER SYMPTOMS
OCCASIONAL FEELINGS OF UNSTEADINESS: 1
SPEECH DIFFICULTY: 0
SHORTNESS OF BREATH: 1
DIZZINESS: 0
HEADACHES: 0
COUGH: 0
ARTHRALGIAS: 0
APPETITE CHANGE: 0
POLYDIPSIA: 0
TROUBLE SWALLOWING: 0
MYALGIAS: 0
BRUISES/BLEEDS EASILY: 0
POLYPHAGIA: 0
UNEXPECTED WEIGHT CHANGE: 0
PALPITATIONS: 0
WEAKNESS: 0
LOSS OF SENSATION IN FEET: 0
DEPRESSION: 0
CONFUSION: 0
FATIGUE: 0
DIARRHEA: 0
CONSTIPATION: 0
NAUSEA: 0

## 2024-04-11 ASSESSMENT — ACTIVITIES OF DAILY LIVING (ADL)
BATHING: INDEPENDENT
TAKING_MEDICATION: INDEPENDENT
DOING_HOUSEWORK: NEEDS ASSISTANCE
GROCERY_SHOPPING: NEEDS ASSISTANCE
MANAGING_FINANCES: INDEPENDENT

## 2024-04-11 NOTE — PATIENT INSTRUCTIONS
Get spine xray today on the way out of the building.     Use aspercreme on back.  Can also Try Voltaren cream or gel, this is topical pain killer. Call me if not doing better.     IF you are struggling with back, can refer to Spine Doctor.     Reschedule with cancer doctor.     Referred to pulmonology.     Limit sugary drinks and snacks. Will recheck in 3 months.

## 2024-04-11 NOTE — ASSESSMENT & PLAN NOTE
Encouraged to barb gonzalez with oncology. She had cancelled her tests due to back pain from fracture and is planning to reschedule.

## 2024-04-11 NOTE — PROGRESS NOTES
Subjective   Patient ID: Abigail Aquino is a 79 y.o. female who presents for Medicare Wellness.    Here for medicare wellness and follow up of chronic conditions.     Compression fracture thoracic - she is having back pain. She was sent home from ER with pills that upset her stomach and Lidocaine patches times 10. She was unaware that she had a compression fracture overdue for a bone density. Per chart, was sent home with Tramadol and Lidoderm patches. She was supposed to follow up with Dr. Raya but did not get an appointment.     COPD - breathing is ok. Sees Pulmonary. Stopped her home oxygen. She is on Breztri, Uses Duoneb as needed for shortness of breath. Supposed to use 4 times a day. She forgets to use it.Uses Oxygen at home all the time. She got portable that is too heavy and she cannot wear it away from home. She goes without it for 3.5 to 4 hours at a time in the day. She does not use oxygen when leaves home.     LLL lung cancer - she was supposed to have some tests done because she wanted to get new hearing aids and glasses and see a dentist. Then on 3/5 she opened a window sash on [employer]. Sounded like pop in her back and felt like a snap. Eli to ER and dx with compression fracture. She has not been able to do usial activities since then due to pain. Has finally started improving. Standing and walking are hard.    On Eliquis after repeat PE> No bleeding. Some ecchymoses on arms, one on medial lower leg left.    ASCVD - has appt with cardiologist in 4 weeks.     A1C done Feb 6.2. Was done at ER    Noticed purple spot middle lower leg last week. Looks like the purpura on her hand and no pain. No trauma to the area. She has some swelling in the leg chronically. She has never tried compression socks.     Here for annual wellness exam. Last wellness 1 yr.     New concerns: as above. Not currently smoking.   Feels: poorly    Changes  to health/medications since last visit - See above  Other providers seen  since last visit Oncology, Radiation Oncology,  Periods: n/a  Contraception: not applicable    Healthy lifestyle habits: Regular exercise: limited by illness and pain from fracture                                        Dietary habits: Picky, eating a little less lately                                        Social connections/relationships good                                        Wears seatbelts Yes                                         Sunscreen No                                         Sexual Risk Factors No        Health screenings:  Pap smear: n/a HPV n/a repeat due n/a                                  Mammogram not applicable                                  Self-breast Exam No                                   Colon screening aged out                                  Dexa overdue                                  Hep C screening No                                   HIV screening no                                  STI screeningnot applicable                          Health risks: Domestic Violence no                      Work-life balance Retired, lives by self                      Smoke detectors Yes                       Carbon monoxide detectors yes                      Gun safety not applicable                      Second-hand Smoke Yes                       Occupational Risks no    Immunizations:  Influenza:  Yes                             Tdap: No                             HPV: not applicable                           Pneumonia: up to date                           Shingrix: no                            COVID: COVID times 4.      Drinks a lot of sugary drinks.   Last l ipids normal last November.                Patient Care Team:  Vikki Marquez MD as PCP - General  Emily Castañeda MD as Medical Oncologist (Hematology and Oncology)  Regla Parks RN as Care Manager (Case Management)  Wesley Hernandez MD as Surgeon (Pulmonary Disease)  Donny Arizmendi DO as Surgeon  (Gastroenterology)  Clif WHALEY MD as Radiation Oncologist (Radiation Oncology)    Patient Active Problem List   Diagnosis    Atherosclerotic heart disease of native coronary artery without angina pectoris    Chronic obstructive pulmonary disease, unspecified (CMS/HCC)    Difficulty in walking, not elsewhere classified    Mixed hyperlipidemia    Ulcerative colitis, unspecified, without complications (CMS/HCC)    Presence of stent in coronary artery    History of pulmonary embolism    Cigarette smoker    Lung nodules    Lymphocytic colitis    Other acute pulmonary embolism, unspecified whether acute cor pulmonale present (CMS/HCC)    Primary cancer of left lower lobe of lung (CMS/HCC)    Nicotine dependence    Chronic respiratory failure with hypoxia (CMS/HCC)    Age-related physical debility    Long term (current) use of anticoagulants    Hypertensive heart disease with heart failure (CMS/HCC)    Osteoporosis    Compression fracture of vertebra (CMS/HCC)         Current Outpatient Medications:     albuterol 90 mcg/actuation inhaler, INHALE 2 PUFFS BY MOUTH FOUR TIMES A DAY AS NEEDED FOR SHORTNESS OF BREATH, Disp: 18 g, Rfl: 1    budesonide-glycopyr-formoterol (Breztri Aerosphere) 160-9-4.8 mcg/actuation HFA aerosol inhaler, Inhale 2 puffs 2 times a day., Disp: 24 g, Rfl: 11    Eliquis 5 mg tablet, Take 1 tablet (5 mg) by mouth 2 times a day., Disp: , Rfl:     furosemide (Lasix) 20 mg tablet, TAKE 1 TABLET BY MOUTH ONCE DAILY, Disp: 90 tablet, Rfl: 0    isosorbide mononitrate ER (Imdur) 30 mg 24 hr tablet, Take 1 tablet (30 mg) by mouth once daily., Disp: , Rfl:     metoprolol tartrate (Lopressor) 50 mg tablet, Take 1 tablet by mouth 2 times a day., Disp: , Rfl:     pravastatin (Pravachol) 40 mg tablet, Take 1 tablet (40 mg) by mouth once daily., Disp: , Rfl:     budesonide EC (Entocort EC) 3 mg 24 hr capsule, Take 1 capsule (3 mg) by mouth once daily in the morning., Disp: , Rfl:     fluticasone  "furoate-vilanteroL (Breo Ellipta) 200-25 mcg/dose inhaler, Inhale 1 puff once daily., Disp: , Rfl:     ipratropium-albuteroL (Duo-Neb) 0.5-2.5 mg/3 mL nebulizer solution, Take 3 mL by nebulization 4 times a day as needed for wheezing or shortness of breath. (Patient not taking: Reported on 4/11/2024), Disp: 360 mL, Rfl: 11    lidocaine (Lidoderm) 5 % patch, Place 1 patch on the skin once daily. Leave on for 12 hours, remove for 12 hours (Patient not taking: Reported on 4/11/2024), Disp: 10 patch, Rfl: 0    tiotropium (Spiriva Respimat) 2.5 mcg/actuation inhaler, Inhale 2 puffs once daily., Disp: , Rfl:     Past Medical, Surgical, Family, Social, and Substance Histories Reviewed.     Medicare Wellness Questionnaires and Histories in Nursing Notes Reviewed.     Review of Systems   Constitutional:  Negative for appetite change, fatigue and unexpected weight change.   HENT:  Positive for hearing loss. Negative for trouble swallowing.    Eyes:  Negative for visual disturbance.   Respiratory:  Positive for shortness of breath. Negative for cough.         Short of breath only when walking around without oxygen   Cardiovascular:  Negative for chest pain, palpitations and leg swelling.   Gastrointestinal:  Negative for constipation, diarrhea and nausea.   Endocrine: Negative for cold intolerance, heat intolerance, polydipsia, polyphagia and polyuria.   Musculoskeletal:  Negative for arthralgias and myalgias.   Skin:  Negative for rash.   Neurological:  Negative for dizziness, speech difficulty, weakness and headaches.   Hematological:  Does not bruise/bleed easily.   Psychiatric/Behavioral:  Negative for confusion.        Objective   /84 (BP Location: Left arm, Patient Position: Sitting, BP Cuff Size: Small adult)   Pulse 71   Temp 36.2 °C (97.1 °F) (Temporal)   Resp 16   Ht 1.6 m (5' 3\")   Wt 58.1 kg (128 lb)   SpO2 (!) 81%   BMI 22.67 kg/m²     Physical Exam  Vitals reviewed.   Constitutional:       General: " She is not in acute distress.     Appearance: She is not ill-appearing.   Neck:      Vascular: No carotid bruit.   Cardiovascular:      Rate and Rhythm: Normal rate and regular rhythm.      Pulses: Normal pulses.      Heart sounds: No murmur heard.  Pulmonary:      Effort: Pulmonary effort is normal.      Breath sounds: Normal breath sounds.   Musculoskeletal:      Right lower leg: No edema.      Left lower leg: No edema.   Skin:     Findings: No rash.      Comments: 1.5 by 3 cm irregular shaped purpuric lesion left lower leg. No raised areas, Borders well-demarcated. Nontender, blanching   Neurological:      Mental Status: She is alert and oriented to person, place, and time.   Psychiatric:         Mood and Affect: Mood normal.       Lab Results   Component Value Date    HGBA1C 6.2 (H) 02/10/2024       Chemistry    Lab Results   Component Value Date/Time     03/05/2024 1503    K 4.4 03/05/2024 1503     03/05/2024 1503    CO2 28 03/05/2024 1503    BUN 10 03/05/2024 1503    CREATININE 0.58 03/05/2024 1503    Lab Results   Component Value Date/Time    CALCIUM 8.9 03/05/2024 1503    ALKPHOS 77 03/05/2024 1503    AST 17 03/05/2024 1503    ALT 20 03/05/2024 1503    BILITOT 0.9 03/05/2024 1503        Lab Results   Component Value Date    WBC 11.8 (H) 03/05/2024    HGB 14.8 03/05/2024    HCT 44.0 03/05/2024    MCV 98 03/05/2024     03/05/2024       Assessment/Plan   Problem List Items Addressed This Visit       Atherosclerotic heart disease of native coronary artery without angina pectoris     On Isosorbide and Furosemide and Beta blocker in additon to Pravastatin. Has upcoming appt with new cardiologist         Chronic obstructive pulmonary disease, unspecified (CMS/HCC)     Seeing pulmonology. Doing well now that using inhalers more regularly.          Relevant Orders    Referral to Pulmonology    Mixed hyperlipidemia     Last lipids about 6 months ago at goal. Repeat with next labs.           Ulcerative colitis, unspecified, without complications (CMS/HCC)     On Budesonid. No symptoms. Has not had B12 and D levels done. Med list updated and meds confirmed         History of pulmonary embolism    Cigarette smoker     Has cut back but not interested in engaiging in convrsation about stopping totall. She is stressed currently with eye issues and cancer and not ready to abstain.         Other acute pulmonary embolism, unspecified whether acute cor pulmonale present (CMS/[facility])     Recurrent Pes. Doing well with Eliquis. No bleeding.          Primary cancer of left lower lobe of lung (CMS/[facility])     Encouraged to barb gonzalez with oncology. She had cancelled her tests due to back pain from fracture and is planning to reschedule.          Relevant Orders    Referral to Pulmonology    Chronic respiratory failure with hypoxia (CMS/[facility])     At low in office, did not bring her oxycen due to is weight. Referred to pulmonology. Using controller and prns.          Hypertensive heart disease with heart failure (CMS/[facility])     Clinically stable. Reviewed labs.          Osteoporosis     Overdue for dexa. Not on treatment for osteoporosis. Due for Dexa.          Compression fracture of vertebra (CMS/[facility])     Had pain pills for 3 days, did not like and no help. Lidoderm patches not helpful. She has not been able to do much of anything due to pain when up and walking.          Relevant Orders    XR lumbar spine complete 4+ views     Other Visit Diagnoses       Well adult exam    -  Primary    Discussed health maintenance for age, immunizations, health lifestyle.    Need for vaccination        Recommended Tdap, COVID booster, RSV and shingles at pharmacy    Essential (primary) hypertension        Elevated hemoglobin A1c        Relevant Orders    Hemoglobin A1C    Basic Metabolic Panel              Assessment, plans, tests, and follow up discussed with patient and patient verbalized understanding. Abigail was given an opportunity to  ask questions and  any concerns were addressed including but not limited to medications, goals of therapy,prevention, follow up.

## 2024-04-11 NOTE — ASSESSMENT & PLAN NOTE
Had pain pills for 3 days, did not like and no help. Lidoderm patches not helpful. She has not been able to do much of anything due to pain when up and walking.

## 2024-04-12 NOTE — ASSESSMENT & PLAN NOTE
On Isosorbide and Furosemide and Beta blocker in additon to Pravastatin. Has upcoming appt with new cardiologist

## 2024-04-12 NOTE — ASSESSMENT & PLAN NOTE
At low in office, did not bring her oxycen due to is weight. Referred to pulmonology. Using controller and prns.

## 2024-04-12 NOTE — ASSESSMENT & PLAN NOTE
Has cut back but not interested in engaiging in convrsation about stopping totall. She is stressed currently with eye issues and cancer and not ready to abstain.

## 2024-04-26 ENCOUNTER — APPOINTMENT (OUTPATIENT)
Dept: PULMONOLOGY | Facility: HOSPITAL | Age: 79
End: 2024-04-26
Payer: COMMERCIAL

## 2024-05-02 ENCOUNTER — APPOINTMENT (OUTPATIENT)
Dept: RADIOLOGY | Facility: HOSPITAL | Age: 79
DRG: 190 | End: 2024-05-02
Payer: COMMERCIAL

## 2024-05-02 ENCOUNTER — HOSPITAL ENCOUNTER (INPATIENT)
Facility: HOSPITAL | Age: 79
LOS: 6 days | Discharge: HOME | DRG: 190 | End: 2024-05-09
Attending: EMERGENCY MEDICINE | Admitting: INTERNAL MEDICINE
Payer: COMMERCIAL

## 2024-05-02 ENCOUNTER — TELEPHONE (OUTPATIENT)
Dept: PRIMARY CARE | Facility: CLINIC | Age: 79
End: 2024-05-02
Payer: COMMERCIAL

## 2024-05-02 ENCOUNTER — APPOINTMENT (OUTPATIENT)
Dept: CARDIOLOGY | Facility: HOSPITAL | Age: 79
DRG: 190 | End: 2024-05-02
Payer: COMMERCIAL

## 2024-05-02 DIAGNOSIS — I25.10 ATHEROSCLEROSIS OF NATIVE CORONARY ARTERY WITHOUT ANGINA PECTORIS, UNSPECIFIED WHETHER NATIVE OR TRANSPLANTED HEART: ICD-10-CM

## 2024-05-02 DIAGNOSIS — J44.1 COPD EXACERBATION (MULTI): Primary | ICD-10-CM

## 2024-05-02 LAB
ALBUMIN SERPL BCP-MCNC: 4.1 G/DL (ref 3.4–5)
ALP SERPL-CCNC: 70 U/L (ref 33–136)
ALT SERPL W P-5'-P-CCNC: 9 U/L (ref 7–45)
ANION GAP BLDV CALCULATED.4IONS-SCNC: 3 MMOL/L (ref 10–25)
ANION GAP BLDV CALCULATED.4IONS-SCNC: 4 MMOL/L (ref 10–25)
ANION GAP SERPL CALC-SCNC: 10 MMOL/L (ref 10–20)
AST SERPL W P-5'-P-CCNC: 15 U/L (ref 9–39)
BASE EXCESS BLDV CALC-SCNC: 10.4 MMOL/L (ref -2–3)
BASE EXCESS BLDV CALC-SCNC: 9.3 MMOL/L (ref -2–3)
BASOPHILS # BLD AUTO: 0.08 X10*3/UL (ref 0–0.1)
BASOPHILS NFR BLD AUTO: 0.9 %
BILIRUB SERPL-MCNC: 0.5 MG/DL (ref 0–1.2)
BNP SERPL-MCNC: 229 PG/ML (ref 0–99)
BODY TEMPERATURE: 37 DEGREES CELSIUS
BODY TEMPERATURE: 37 DEGREES CELSIUS
BUN SERPL-MCNC: 8 MG/DL (ref 6–23)
CA-I BLDV-SCNC: 1.21 MMOL/L (ref 1.1–1.33)
CA-I BLDV-SCNC: 1.23 MMOL/L (ref 1.1–1.33)
CALCIUM SERPL-MCNC: 9.5 MG/DL (ref 8.6–10.3)
CARDIAC TROPONIN I PNL SERPL HS: 7 NG/L (ref 0–13)
CARDIAC TROPONIN I PNL SERPL HS: 8 NG/L (ref 0–13)
CHLORIDE BLDV-SCNC: 100 MMOL/L (ref 98–107)
CHLORIDE BLDV-SCNC: 99 MMOL/L (ref 98–107)
CHLORIDE SERPL-SCNC: 99 MMOL/L (ref 98–107)
CO2 SERPL-SCNC: 37 MMOL/L (ref 21–32)
CREAT SERPL-MCNC: 0.56 MG/DL (ref 0.5–1.05)
EGFRCR SERPLBLD CKD-EPI 2021: >90 ML/MIN/1.73M*2
EOSINOPHIL # BLD AUTO: 0.56 X10*3/UL (ref 0–0.4)
EOSINOPHIL NFR BLD AUTO: 6.6 %
ERYTHROCYTE [DISTWIDTH] IN BLOOD BY AUTOMATED COUNT: 13.2 % (ref 11.5–14.5)
GLUCOSE BLDV-MCNC: 118 MG/DL (ref 74–99)
GLUCOSE BLDV-MCNC: 133 MG/DL (ref 74–99)
GLUCOSE SERPL-MCNC: 118 MG/DL (ref 74–99)
HCO3 BLDV-SCNC: 38.8 MMOL/L (ref 22–26)
HCO3 BLDV-SCNC: 40.2 MMOL/L (ref 22–26)
HCT VFR BLD AUTO: 44.2 % (ref 36–46)
HCT VFR BLD EST: 41 % (ref 36–46)
HCT VFR BLD EST: 44 % (ref 36–46)
HGB BLD-MCNC: 14.4 G/DL (ref 12–16)
HGB BLDV-MCNC: 13.7 G/DL (ref 12–16)
HGB BLDV-MCNC: 14.6 G/DL (ref 12–16)
IMM GRANULOCYTES # BLD AUTO: 0.03 X10*3/UL (ref 0–0.5)
IMM GRANULOCYTES NFR BLD AUTO: 0.4 % (ref 0–0.9)
INHALED O2 CONCENTRATION: 32 %
INHALED O2 CONCENTRATION: 32 %
INR PPP: 1.2 (ref 0.9–1.1)
LACTATE BLDV-SCNC: 0.8 MMOL/L (ref 0.4–2)
LACTATE BLDV-SCNC: 1 MMOL/L (ref 0.4–2)
LYMPHOCYTES # BLD AUTO: 1.98 X10*3/UL (ref 0.8–3)
LYMPHOCYTES NFR BLD AUTO: 23.3 %
MCH RBC QN AUTO: 32 PG (ref 26–34)
MCHC RBC AUTO-ENTMCNC: 32.6 G/DL (ref 32–36)
MCV RBC AUTO: 98 FL (ref 80–100)
MONOCYTES # BLD AUTO: 0.71 X10*3/UL (ref 0.05–0.8)
MONOCYTES NFR BLD AUTO: 8.4 %
NEUTROPHILS # BLD AUTO: 5.14 X10*3/UL (ref 1.6–5.5)
NEUTROPHILS NFR BLD AUTO: 60.4 %
NRBC BLD-RTO: 0 /100 WBCS (ref 0–0)
OXYHGB MFR BLDV: 40.4 % (ref 45–75)
OXYHGB MFR BLDV: 65.9 % (ref 45–75)
PCO2 BLDV: 77 MM HG (ref 41–51)
PCO2 BLDV: 78 MM HG (ref 41–51)
PH BLDV: 7.31 PH (ref 7.33–7.43)
PH BLDV: 7.32 PH (ref 7.33–7.43)
PLATELET # BLD AUTO: 240 X10*3/UL (ref 150–450)
PO2 BLDV: 28 MM HG (ref 35–45)
PO2 BLDV: 43 MM HG (ref 35–45)
POTASSIUM BLDV-SCNC: 3.3 MMOL/L (ref 3.5–5.3)
POTASSIUM BLDV-SCNC: 4 MMOL/L (ref 3.5–5.3)
POTASSIUM SERPL-SCNC: 3.7 MMOL/L (ref 3.5–5.3)
PROT SERPL-MCNC: 6.8 G/DL (ref 6.4–8.2)
PROTHROMBIN TIME: 13.7 SECONDS (ref 9.8–12.8)
RBC # BLD AUTO: 4.5 X10*6/UL (ref 4–5.2)
SAO2 % BLDV: 42 % (ref 45–75)
SAO2 % BLDV: 68 % (ref 45–75)
SODIUM BLDV-SCNC: 138 MMOL/L (ref 136–145)
SODIUM BLDV-SCNC: 139 MMOL/L (ref 136–145)
SODIUM SERPL-SCNC: 142 MMOL/L (ref 136–145)
WBC # BLD AUTO: 8.5 X10*3/UL (ref 4.4–11.3)

## 2024-05-02 PROCEDURE — 71045 X-RAY EXAM CHEST 1 VIEW: CPT | Performed by: STUDENT IN AN ORGANIZED HEALTH CARE EDUCATION/TRAINING PROGRAM

## 2024-05-02 PROCEDURE — 2550000001 HC RX 255 CONTRASTS: Performed by: EMERGENCY MEDICINE

## 2024-05-02 PROCEDURE — 85025 COMPLETE CBC W/AUTO DIFF WBC: CPT | Performed by: EMERGENCY MEDICINE

## 2024-05-02 PROCEDURE — 85610 PROTHROMBIN TIME: CPT | Performed by: EMERGENCY MEDICINE

## 2024-05-02 PROCEDURE — 84484 ASSAY OF TROPONIN QUANT: CPT | Performed by: EMERGENCY MEDICINE

## 2024-05-02 PROCEDURE — 71275 CT ANGIOGRAPHY CHEST: CPT

## 2024-05-02 PROCEDURE — 99285 EMERGENCY DEPT VISIT HI MDM: CPT | Mod: 25

## 2024-05-02 PROCEDURE — 83880 ASSAY OF NATRIURETIC PEPTIDE: CPT | Performed by: EMERGENCY MEDICINE

## 2024-05-02 PROCEDURE — 84132 ASSAY OF SERUM POTASSIUM: CPT | Mod: 91 | Performed by: EMERGENCY MEDICINE

## 2024-05-02 PROCEDURE — 36415 COLL VENOUS BLD VENIPUNCTURE: CPT | Performed by: EMERGENCY MEDICINE

## 2024-05-02 PROCEDURE — 94640 AIRWAY INHALATION TREATMENT: CPT

## 2024-05-02 PROCEDURE — 93005 ELECTROCARDIOGRAM TRACING: CPT

## 2024-05-02 PROCEDURE — 2500000002 HC RX 250 W HCPCS SELF ADMINISTERED DRUGS (ALT 637 FOR MEDICARE OP, ALT 636 FOR OP/ED): Performed by: EMERGENCY MEDICINE

## 2024-05-02 PROCEDURE — 71275 CT ANGIOGRAPHY CHEST: CPT | Performed by: RADIOLOGY

## 2024-05-02 PROCEDURE — 96375 TX/PRO/DX INJ NEW DRUG ADDON: CPT

## 2024-05-02 PROCEDURE — 2500000004 HC RX 250 GENERAL PHARMACY W/ HCPCS (ALT 636 FOR OP/ED): Performed by: EMERGENCY MEDICINE

## 2024-05-02 PROCEDURE — 71045 X-RAY EXAM CHEST 1 VIEW: CPT

## 2024-05-02 RX ORDER — IPRATROPIUM BROMIDE AND ALBUTEROL SULFATE 2.5; .5 MG/3ML; MG/3ML
6 SOLUTION RESPIRATORY (INHALATION) ONCE
Status: COMPLETED | OUTPATIENT
Start: 2024-05-02 | End: 2024-05-02

## 2024-05-02 RX ORDER — LORAZEPAM 2 MG/ML
1 INJECTION INTRAMUSCULAR ONCE
Status: COMPLETED | OUTPATIENT
Start: 2024-05-02 | End: 2024-05-02

## 2024-05-02 RX ORDER — ALBUTEROL SULFATE 0.83 MG/ML
2.5 SOLUTION RESPIRATORY (INHALATION) ONCE
Status: COMPLETED | OUTPATIENT
Start: 2024-05-02 | End: 2024-05-02

## 2024-05-02 RX ORDER — FUROSEMIDE 20 MG/1
20 TABLET ORAL DAILY
Qty: 90 TABLET | Refills: 0 | Status: SHIPPED | OUTPATIENT
Start: 2024-05-02 | End: 2024-07-31

## 2024-05-02 RX ADMIN — IOHEXOL 75 ML: 350 INJECTION, SOLUTION INTRAVENOUS at 23:04

## 2024-05-02 RX ADMIN — ALBUTEROL SULFATE 2.5 MG: 2.5 SOLUTION RESPIRATORY (INHALATION) at 21:00

## 2024-05-02 RX ADMIN — LORAZEPAM 1 MG: 2 INJECTION INTRAMUSCULAR; INTRAVENOUS at 22:05

## 2024-05-02 RX ADMIN — METHYLPREDNISOLONE SODIUM SUCCINATE 125 MG: 125 INJECTION, POWDER, FOR SOLUTION INTRAMUSCULAR; INTRAVENOUS at 19:35

## 2024-05-02 RX ADMIN — IPRATROPIUM BROMIDE AND ALBUTEROL SULFATE 6 ML: 2.5; .5 SOLUTION RESPIRATORY (INHALATION) at 19:35

## 2024-05-02 ASSESSMENT — LIFESTYLE VARIABLES
HAVE PEOPLE ANNOYED YOU BY CRITICIZING YOUR DRINKING: NO
TOTAL SCORE: 0
HAVE YOU EVER FELT YOU SHOULD CUT DOWN ON YOUR DRINKING: NO
EVER HAD A DRINK FIRST THING IN THE MORNING TO STEADY YOUR NERVES TO GET RID OF A HANGOVER: NO
EVER FELT BAD OR GUILTY ABOUT YOUR DRINKING: NO

## 2024-05-02 ASSESSMENT — COLUMBIA-SUICIDE SEVERITY RATING SCALE - C-SSRS
1. IN THE PAST MONTH, HAVE YOU WISHED YOU WERE DEAD OR WISHED YOU COULD GO TO SLEEP AND NOT WAKE UP?: NO
2. HAVE YOU ACTUALLY HAD ANY THOUGHTS OF KILLING YOURSELF?: NO
6. HAVE YOU EVER DONE ANYTHING, STARTED TO DO ANYTHING, OR PREPARED TO DO ANYTHING TO END YOUR LIFE?: NO

## 2024-05-02 ASSESSMENT — PAIN SCALES - GENERAL: PAINLEVEL_OUTOF10: 0 - NO PAIN

## 2024-05-02 ASSESSMENT — PAIN - FUNCTIONAL ASSESSMENT: PAIN_FUNCTIONAL_ASSESSMENT: 0-10

## 2024-05-02 NOTE — TELEPHONE ENCOUNTER
Rx Refill Request Telephone Encounter    Name:  Abigail Aquino  :  [id number]  Medication Name:        furosemide (Lasix) 20 mg tablet [[id number]]  1 tab taken once daily                    Specific Pharmacy location:  [facility]  Date of last appointment:  2024  Date of next appointment:  2024  Best number to reach patient:  [phone]

## 2024-05-02 NOTE — ED PROVIDER NOTES
HPI   No chief complaint on file.      HPI     HISTORY OF PRESENT ILLNESS:  Patient is a 79-year-old female history of COPD on 3 L baseline oxygen, adenocarcinoma of the lung not currently undergoing treatment, pulmonary embolism on Eliquis who presents emergency department shortness of breath.  Patient started to feel increasingly short of breath yesterday.  Associated with a mucous cough.  Patient denied any fever, chills.  Did not improve with home treatments.  Patient called EMS and was brought here.  Patient claims compliant with medications.  No lower extremity swelling.    Past Medical History: COPD on 3 L baseline oxygen, CAD, adenocarcinoma of the left lung, pulmonary emboli, hypertension hyperlipidemia, Eliquis     __________________________________________________________  PHYSICAL EXAM:    Appearance: Appears stated age  female, able to speak in 5-7 word sentences, alert, oriented , cooperative   Skin: Intact,  dry skin, no lesions, rash, petechiae or purpura.   Eyes: PERRLA, EOMs intact,  Conjunctiva pink with no redness or exudates.    HENT: Normocephalic, atraumatic. Nares patent   Neck: Supple. Trachea at midline.   Pulmonary: Wheezing and coarse lung sounds bilaterally, on 3 L nasal cannula oxygen.  Cardiac: Regular rate and rhythm, no rubs, murmurs, or gallops. No JVD,   Abdomen: Abdomen is soft, nontender, and nondistended.  No palpable organomegaly.  No rebound or guarding.  No CVA tenderness. Nonsurgical abdomen  Genitourinary: Exam deferred.  Musculoskeletal: no edema, pain, cyanosis, or deformity in extremities. Pulses full and equal.   Neurological:  Cranial nerves are grossly intact, grossly normal sensation, no weakness, no focal findings identified.    __________________________________________________________  MEDICAL DECISION MAKING:    Patient was seen and examined.  Patient is somewhat of a poor historian, not able to state fully clearly when this short of redness of breath  started.  It seemed to have started yesterday and when I asked her if she felt like she had improvement with the breathing treatments, she kept talking about her use of breathing treatments yesterday.  However, per EMS squad, patient did seem to respond to DuoNeb treatments.  Patient here was at baseline oxygen.  Therefore, we will obtain labs and CT imaging, given prior history of pulmonary embolism.    Patient is unable to lay flat for the CT scan.  There is seem to have been a component of anxiety as the patient was able to rest.  Patient in the meantime had a workup that showed borderline acidosis with a pH of 7.31 and a CO2 of 77.  Patient did not appear like he needed BiPAP treatment at this moment.  Remaining workup was overall unremarkable.  I did serially monitor the VBG.  After additional breathing treatments, patient was feeling better and for anxiety was given Ativan.  Patient was tested by laying down here in the emergency department prior to going to CT.  CT imaging has been obtained.  However, patient was very tired and was not reevaluation appropriate, likely related to the Ativan..  Lung sounds were clear.  Patient care signed out to overnight physician pending reevaluation, possible VBG repeated to make sure that she is not hypercapnic.  Disposition will be based on this reevaluation for diagnosis of a COPD exacerbation.      Chronic Medical Conditions Significantly Affecting Care: COPD on 3 L baseline oxygen, CAD, adenocarcinoma of the left lung, pulmonary emboli, hypertension hyperlipidemia, Eliquis    External Records Reviewed: I reviewed recent and relevant outside records including: Patient discharge summary from February 14, 2024    [facility]  Emergency Medicine               No data recorded                   Patient History   Past Medical History:   Diagnosis Date    Clotting disorder (Multi)     COPD (chronic obstructive pulmonary disease) (Multi)     Eyebrow laceration, right, sequela  10/04/2023    Fracture tibia/fibula, right, sequela 10/11/2021    Hypertension     Kidney stones 2017    Solitary pulmonary nodule 2023     Past Surgical History:   Procedure Laterality Date    APPENDECTOMY      CT GUIDED PERCUTANEOUS BIOPSY LUNG  2023    CT GUIDED PERCUTANEOUS BIOPSY LUNG 2023 POR CT    TONSILLECTOMY       Family History   Problem Relation Name Age of Onset    Cancer Daughter       Social History     Tobacco Use    Smoking status: Former     Current packs/day: 0.00     Types: Cigarettes     Quit date: 2023     Years since quittin.6     Passive exposure: Past    Smokeless tobacco: Never   Vaping Use    Vaping status: Never Used   Substance Use Topics    Alcohol use: Never    Drug use: Never       Physical Exam   ED Triage Vitals   Temp Pulse Resp BP   -- -- -- --      SpO2 Temp src Heart Rate Source Patient Position   -- -- -- --      BP Location FiO2 (%)     -- --       Physical Exam    ED Course & [facility]   ED Course as of 24 1545   Thu May 02, 2024   2008 Patient twelve-lead EKG interpreted by myself shows sinus rhythm, ventricular rate of 79, normal parable, normal axis, PACs present, biatrial enlargement, normal QRS duration, normal QT, no STEMI. [WJ]    Patient's chest x-ray was independently reviewed myself and appeared negative for obvious pneumothorax or opacity.  Patient does appear to have hyperinflated lungs.  Radiology read is pending. [WJ]   2490 CT angio chest for pulmonary embolism    IMPRESSION:  1. No acute pulmonary embolism to the segmental arterial level.  2. Centrilobular and paraseptal emphysema noted. There is a 1.3 x 2.9  cm lobulated nodule in the left lower lobe consistent with patient's  known history of malignancy. There several additional 4-5 mm nodules  1 of which appears to be new as described above. Attention on  continued follow-up is advised.  3. Question subtle nodularity of the liver contour which can be seen  in the setting  of cirrhosis. Correlate with liver function tests.  4. Additional findings as noted above.   [WJ]      ED Course User Index  [WJ] Abdifatah Holguin DO         Diagnoses as of 05/05/24 1545   COPD exacerbation (Multi)       Medical Decision Making      Procedure  Procedures     Abdifatah Holguin DO  05/05/24 1549

## 2024-05-02 NOTE — ED TRIAGE NOTES
Pt from home here by EMS, SOB has COPD  & wears 3L n/c at home, increased SOB and productive coughing started yesterday, clear sputum, increased respiration rate non-labored, increased SOB with ambulation, A&Ox4 GCS 15

## 2024-05-03 ENCOUNTER — TELEPHONE (OUTPATIENT)
Dept: PRIMARY CARE | Facility: CLINIC | Age: 79
End: 2024-05-03

## 2024-05-03 PROBLEM — S32.000A COMPRESSION FRACTURE OF LUMBAR VERTEBRA (MULTI): Status: ACTIVE | Noted: 2024-04-11

## 2024-05-03 PROBLEM — R73.09 ELEVATED HEMOGLOBIN A1C: Status: ACTIVE | Noted: 2024-05-03

## 2024-05-03 PROBLEM — E78.5 DYSLIPIDEMIA: Status: ACTIVE | Noted: 2024-05-03

## 2024-05-03 PROBLEM — I10 HYPERTENSION: Status: ACTIVE | Noted: 2024-05-03

## 2024-05-03 PROBLEM — M80.80XA PATHOLOGICAL FRACTURE DUE TO OSTEOPOROSIS: Status: ACTIVE | Noted: 2024-03-04

## 2024-05-03 PROBLEM — J44.1 COPD EXACERBATION (MULTI): Status: ACTIVE | Noted: 2024-05-03

## 2024-05-03 PROCEDURE — 99222 1ST HOSP IP/OBS MODERATE 55: CPT | Performed by: INTERNAL MEDICINE

## 2024-05-03 PROCEDURE — 2500000002 HC RX 250 W HCPCS SELF ADMINISTERED DRUGS (ALT 637 FOR MEDICARE OP, ALT 636 FOR OP/ED): Mod: MUE | Performed by: EMERGENCY MEDICINE

## 2024-05-03 PROCEDURE — 2500000001 HC RX 250 WO HCPCS SELF ADMINISTERED DRUGS (ALT 637 FOR MEDICARE OP): Performed by: HOSPITALIST

## 2024-05-03 PROCEDURE — 96375 TX/PRO/DX INJ NEW DRUG ADDON: CPT

## 2024-05-03 PROCEDURE — 96365 THER/PROPH/DIAG IV INF INIT: CPT

## 2024-05-03 PROCEDURE — 2500000004 HC RX 250 GENERAL PHARMACY W/ HCPCS (ALT 636 FOR OP/ED): Performed by: EMERGENCY MEDICINE

## 2024-05-03 PROCEDURE — 2500000002 HC RX 250 W HCPCS SELF ADMINISTERED DRUGS (ALT 637 FOR MEDICARE OP, ALT 636 FOR OP/ED): Performed by: HOSPITALIST

## 2024-05-03 PROCEDURE — 2500000005 HC RX 250 GENERAL PHARMACY W/O HCPCS: Performed by: INTERNAL MEDICINE

## 2024-05-03 PROCEDURE — 2500000001 HC RX 250 WO HCPCS SELF ADMINISTERED DRUGS (ALT 637 FOR MEDICARE OP): Performed by: INTERNAL MEDICINE

## 2024-05-03 PROCEDURE — 9420000001 HC RT PATIENT EDUCATION 5 MIN

## 2024-05-03 PROCEDURE — 2500000004 HC RX 250 GENERAL PHARMACY W/ HCPCS (ALT 636 FOR OP/ED): Performed by: INTERNAL MEDICINE

## 2024-05-03 PROCEDURE — 2500000005 HC RX 250 GENERAL PHARMACY W/O HCPCS: Performed by: EMERGENCY MEDICINE

## 2024-05-03 PROCEDURE — 94640 AIRWAY INHALATION TREATMENT: CPT

## 2024-05-03 PROCEDURE — 1200000002 HC GENERAL ROOM WITH TELEMETRY DAILY

## 2024-05-03 PROCEDURE — 2500000002 HC RX 250 W HCPCS SELF ADMINISTERED DRUGS (ALT 637 FOR MEDICARE OP, ALT 636 FOR OP/ED)

## 2024-05-03 RX ORDER — METOPROLOL TARTRATE 50 MG/1
50 TABLET ORAL 2 TIMES DAILY
Status: DISCONTINUED | OUTPATIENT
Start: 2024-05-03 | End: 2024-05-09 | Stop reason: HOSPADM

## 2024-05-03 RX ORDER — PRAVASTATIN SODIUM 40 MG/1
40 TABLET ORAL DAILY
Status: DISCONTINUED | OUTPATIENT
Start: 2024-05-03 | End: 2024-05-09 | Stop reason: HOSPADM

## 2024-05-03 RX ORDER — ACETAMINOPHEN 325 MG/1
650 TABLET ORAL EVERY 4 HOURS PRN
Status: DISCONTINUED | OUTPATIENT
Start: 2024-05-03 | End: 2024-05-09 | Stop reason: HOSPADM

## 2024-05-03 RX ORDER — ONDANSETRON HYDROCHLORIDE 2 MG/ML
4 INJECTION, SOLUTION INTRAVENOUS EVERY 8 HOURS PRN
Status: DISCONTINUED | OUTPATIENT
Start: 2024-05-03 | End: 2024-05-09 | Stop reason: HOSPADM

## 2024-05-03 RX ORDER — IPRATROPIUM BROMIDE AND ALBUTEROL SULFATE 2.5; .5 MG/3ML; MG/3ML
3 SOLUTION RESPIRATORY (INHALATION) ONCE
Status: DISCONTINUED | OUTPATIENT
Start: 2024-05-03 | End: 2024-05-03

## 2024-05-03 RX ORDER — PREDNISONE 20 MG/1
40 TABLET ORAL DAILY
Qty: 14 TABLET | Refills: 0 | Status: SHIPPED | OUTPATIENT
Start: 2024-05-03 | End: 2024-05-10

## 2024-05-03 RX ORDER — ACETAMINOPHEN 650 MG/1
650 SUPPOSITORY RECTAL EVERY 4 HOURS PRN
Status: DISCONTINUED | OUTPATIENT
Start: 2024-05-03 | End: 2024-05-09 | Stop reason: HOSPADM

## 2024-05-03 RX ORDER — FLUMAZENIL 0.1 MG/ML
0.2 INJECTION INTRAVENOUS ONCE
Status: COMPLETED | OUTPATIENT
Start: 2024-05-03 | End: 2024-05-03

## 2024-05-03 RX ORDER — ONDANSETRON 4 MG/1
4 TABLET, FILM COATED ORAL EVERY 8 HOURS PRN
Status: DISCONTINUED | OUTPATIENT
Start: 2024-05-03 | End: 2024-05-09 | Stop reason: HOSPADM

## 2024-05-03 RX ORDER — BUDESONIDE 3 MG/1
3 CAPSULE, COATED PELLETS ORAL EVERY MORNING
Status: DISCONTINUED | OUTPATIENT
Start: 2024-05-03 | End: 2024-05-08

## 2024-05-03 RX ORDER — GUAIFENESIN/DEXTROMETHORPHAN 100-10MG/5
5 SYRUP ORAL EVERY 4 HOURS PRN
Status: DISCONTINUED | OUTPATIENT
Start: 2024-05-03 | End: 2024-05-05

## 2024-05-03 RX ORDER — DOCUSATE SODIUM 100 MG/1
100 CAPSULE, LIQUID FILLED ORAL 2 TIMES DAILY
Status: DISCONTINUED | OUTPATIENT
Start: 2024-05-03 | End: 2024-05-08

## 2024-05-03 RX ORDER — IPRATROPIUM BROMIDE AND ALBUTEROL SULFATE 2.5; .5 MG/3ML; MG/3ML
3 SOLUTION RESPIRATORY (INHALATION) 4 TIMES DAILY
Status: DISCONTINUED | OUTPATIENT
Start: 2024-05-03 | End: 2024-05-06

## 2024-05-03 RX ORDER — ACETAMINOPHEN 160 MG/5ML
650 SOLUTION ORAL EVERY 4 HOURS PRN
Status: DISCONTINUED | OUTPATIENT
Start: 2024-05-03 | End: 2024-05-09 | Stop reason: HOSPADM

## 2024-05-03 RX ORDER — IPRATROPIUM BROMIDE AND ALBUTEROL SULFATE 2.5; .5 MG/3ML; MG/3ML
SOLUTION RESPIRATORY (INHALATION)
Status: COMPLETED
Start: 2024-05-03 | End: 2024-05-03

## 2024-05-03 RX ORDER — LORAZEPAM 0.5 MG/1
0.5 TABLET ORAL EVERY 8 HOURS PRN
Status: DISCONTINUED | OUTPATIENT
Start: 2024-05-03 | End: 2024-05-05

## 2024-05-03 RX ORDER — AZITHROMYCIN 250 MG/1
250 TABLET, FILM COATED ORAL DAILY
Qty: 4 TABLET | Refills: 0 | Status: SHIPPED | OUTPATIENT
Start: 2024-05-03 | End: 2024-05-07

## 2024-05-03 RX ORDER — IPRATROPIUM BROMIDE AND ALBUTEROL SULFATE 2.5; .5 MG/3ML; MG/3ML
3 SOLUTION RESPIRATORY (INHALATION)
Status: DISCONTINUED | OUTPATIENT
Start: 2024-05-03 | End: 2024-05-03

## 2024-05-03 RX ORDER — IPRATROPIUM BROMIDE AND ALBUTEROL SULFATE 2.5; .5 MG/3ML; MG/3ML
3 SOLUTION RESPIRATORY (INHALATION) 4 TIMES DAILY PRN
Status: DISCONTINUED | OUTPATIENT
Start: 2024-05-03 | End: 2024-05-03

## 2024-05-03 RX ORDER — IPRATROPIUM BROMIDE AND ALBUTEROL SULFATE 2.5; .5 MG/3ML; MG/3ML
3 SOLUTION RESPIRATORY (INHALATION) ONCE
Status: COMPLETED | OUTPATIENT
Start: 2024-05-03 | End: 2024-05-03

## 2024-05-03 RX ORDER — FLUTICASONE FUROATE AND VILANTEROL 200; 25 UG/1; UG/1
1 POWDER RESPIRATORY (INHALATION)
Status: DISCONTINUED | OUTPATIENT
Start: 2024-05-03 | End: 2024-05-09 | Stop reason: HOSPADM

## 2024-05-03 RX ORDER — ISOSORBIDE MONONITRATE 30 MG/1
30 TABLET, EXTENDED RELEASE ORAL DAILY
Status: DISCONTINUED | OUTPATIENT
Start: 2024-05-03 | End: 2024-05-09 | Stop reason: HOSPADM

## 2024-05-03 RX ORDER — OXYMETAZOLINE HCL 0.05 %
2 SPRAY, NON-AEROSOL (ML) NASAL EVERY 12 HOURS PRN
Status: DISPENSED | OUTPATIENT
Start: 2024-05-03 | End: 2024-05-06

## 2024-05-03 RX ORDER — FUROSEMIDE 20 MG/1
20 TABLET ORAL DAILY
Status: DISCONTINUED | OUTPATIENT
Start: 2024-05-03 | End: 2024-05-09 | Stop reason: HOSPADM

## 2024-05-03 RX ADMIN — TIOTROPIUM BROMIDE INHALATION SPRAY 2 PUFF: 3.12 SPRAY, METERED RESPIRATORY (INHALATION) at 11:16

## 2024-05-03 RX ADMIN — Medication 4 L/MIN: at 20:00

## 2024-05-03 RX ADMIN — GUAIFENESIN AND DEXTROMETHORPHAN 5 ML: 100; 10 SYRUP ORAL at 21:39

## 2024-05-03 RX ADMIN — APIXABAN 5 MG: 5 TABLET, FILM COATED ORAL at 11:19

## 2024-05-03 RX ADMIN — IPRATROPIUM BROMIDE AND ALBUTEROL SULFATE 3 ML: 2.5; .5 SOLUTION RESPIRATORY (INHALATION) at 03:32

## 2024-05-03 RX ADMIN — DOCUSATE SODIUM 100 MG: 100 CAPSULE, LIQUID FILLED ORAL at 11:20

## 2024-05-03 RX ADMIN — Medication 8 L/MIN: at 09:30

## 2024-05-03 RX ADMIN — APIXABAN 5 MG: 5 TABLET, FILM COATED ORAL at 21:39

## 2024-05-03 RX ADMIN — IPRATROPIUM BROMIDE AND ALBUTEROL SULFATE 3 ML: 2.5; .5 SOLUTION RESPIRATORY (INHALATION) at 20:38

## 2024-05-03 RX ADMIN — IPRATROPIUM BROMIDE AND ALBUTEROL SULFATE 3 ML: 2.5; .5 SOLUTION RESPIRATORY (INHALATION) at 11:52

## 2024-05-03 RX ADMIN — FLUTICASONE FUROATE AND VILANTEROL TRIFENATATE 1 PUFF: 200; 25 POWDER RESPIRATORY (INHALATION) at 11:16

## 2024-05-03 RX ADMIN — IPRATROPIUM BROMIDE AND ALBUTEROL SULFATE 3 ML: 2.5; .5 SOLUTION RESPIRATORY (INHALATION) at 08:25

## 2024-05-03 RX ADMIN — METHYLPREDNISOLONE SODIUM SUCCINATE 40 MG: 40 INJECTION, POWDER, FOR SOLUTION INTRAMUSCULAR; INTRAVENOUS at 11:16

## 2024-05-03 RX ADMIN — IPRATROPIUM BROMIDE AND ALBUTEROL SULFATE 3 ML: 2.5; .5 SOLUTION RESPIRATORY (INHALATION) at 15:24

## 2024-05-03 RX ADMIN — Medication 4 L/MIN: at 20:38

## 2024-05-03 RX ADMIN — METOPROLOL TARTRATE 50 MG: 50 TABLET, FILM COATED ORAL at 21:39

## 2024-05-03 RX ADMIN — DOXYCYCLINE 100 MG: 100 INJECTION, POWDER, LYOPHILIZED, FOR SOLUTION INTRAVENOUS at 14:30

## 2024-05-03 RX ADMIN — AZITHROMYCIN MONOHYDRATE 500 MG: 500 INJECTION, POWDER, LYOPHILIZED, FOR SOLUTION INTRAVENOUS at 02:20

## 2024-05-03 RX ADMIN — FUROSEMIDE 20 MG: 20 TABLET ORAL at 11:19

## 2024-05-03 RX ADMIN — METHYLPREDNISOLONE SODIUM SUCCINATE 40 MG: 40 INJECTION, POWDER, FOR SOLUTION INTRAMUSCULAR; INTRAVENOUS at 21:40

## 2024-05-03 RX ADMIN — LORAZEPAM 0.5 MG: 0.5 TABLET ORAL at 16:30

## 2024-05-03 RX ADMIN — PRAVASTATIN SODIUM 40 MG: 40 TABLET ORAL at 11:20

## 2024-05-03 RX ADMIN — ISOSORBIDE MONONITRATE 30 MG: 30 TABLET, EXTENDED RELEASE ORAL at 11:19

## 2024-05-03 RX ADMIN — FLUMAZENIL 0.2 MG: 0.1 INJECTION, SOLUTION INTRAVENOUS at 02:19

## 2024-05-03 RX ADMIN — Medication 4 L/MIN: at 11:52

## 2024-05-03 RX ADMIN — METOPROLOL TARTRATE 50 MG: 50 TABLET, FILM COATED ORAL at 11:20

## 2024-05-03 SDOH — ECONOMIC STABILITY: HOUSING INSECURITY
IN THE LAST 12 MONTHS, WAS THERE A TIME WHEN YOU DID NOT HAVE A STEADY PLACE TO SLEEP OR SLEPT IN A SHELTER (INCLUDING NOW)?: NO

## 2024-05-03 SDOH — ECONOMIC STABILITY: INCOME INSECURITY: IN THE LAST 12 MONTHS, WAS THERE A TIME WHEN YOU WERE NOT ABLE TO PAY THE MORTGAGE OR RENT ON TIME?: NO

## 2024-05-03 SDOH — ECONOMIC STABILITY: HOUSING INSECURITY: IN THE LAST 12 MONTHS, HOW MANY PLACES HAVE YOU LIVED?: 1

## 2024-05-03 SDOH — SOCIAL STABILITY: SOCIAL INSECURITY: DOES ANYONE TRY TO KEEP YOU FROM HAVING/CONTACTING OTHER FRIENDS OR DOING THINGS OUTSIDE YOUR HOME?: NO

## 2024-05-03 SDOH — SOCIAL STABILITY: SOCIAL INSECURITY: HAVE YOU HAD ANY THOUGHTS OF HARMING ANYONE ELSE?: NO

## 2024-05-03 SDOH — SOCIAL STABILITY: SOCIAL INSECURITY: ARE YOU OR HAVE YOU BEEN THREATENED OR ABUSED PHYSICALLY, EMOTIONALLY, OR SEXUALLY BY ANYONE?: NO

## 2024-05-03 SDOH — SOCIAL STABILITY: SOCIAL INSECURITY: ARE THERE ANY APPARENT SIGNS OF INJURIES/BEHAVIORS THAT COULD BE RELATED TO ABUSE/NEGLECT?: NO

## 2024-05-03 SDOH — HEALTH STABILITY: PHYSICAL HEALTH: ON AVERAGE, HOW MANY MINUTES DO YOU ENGAGE IN EXERCISE AT THIS LEVEL?: 0 MIN

## 2024-05-03 SDOH — SOCIAL STABILITY: SOCIAL INSECURITY: DO YOU FEEL UNSAFE GOING BACK TO THE PLACE WHERE YOU ARE LIVING?: NO

## 2024-05-03 SDOH — SOCIAL STABILITY: SOCIAL INSECURITY: HAVE YOU HAD THOUGHTS OF HARMING ANYONE ELSE?: NO

## 2024-05-03 SDOH — HEALTH STABILITY: PHYSICAL HEALTH: ON AVERAGE, HOW MANY DAYS PER WEEK DO YOU ENGAGE IN MODERATE TO STRENUOUS EXERCISE (LIKE A BRISK WALK)?: 0 DAYS

## 2024-05-03 SDOH — SOCIAL STABILITY: SOCIAL INSECURITY: HAS ANYONE EVER THREATENED TO HURT YOUR FAMILY OR YOUR PETS?: NO

## 2024-05-03 SDOH — ECONOMIC STABILITY: TRANSPORTATION INSECURITY
IN THE PAST 12 MONTHS, HAS LACK OF TRANSPORTATION KEPT YOU FROM MEETINGS, WORK, OR FROM GETTING THINGS NEEDED FOR DAILY LIVING?: NO

## 2024-05-03 SDOH — SOCIAL STABILITY: SOCIAL INSECURITY: WERE YOU ABLE TO COMPLETE ALL THE BEHAVIORAL HEALTH SCREENINGS?: YES

## 2024-05-03 SDOH — ECONOMIC STABILITY: INCOME INSECURITY: HOW HARD IS IT FOR YOU TO PAY FOR THE VERY BASICS LIKE FOOD, HOUSING, MEDICAL CARE, AND HEATING?: NOT HARD AT ALL

## 2024-05-03 SDOH — SOCIAL STABILITY: SOCIAL INSECURITY: DO YOU FEEL ANYONE HAS EXPLOITED OR TAKEN ADVANTAGE OF YOU FINANCIALLY OR OF YOUR PERSONAL PROPERTY?: NO

## 2024-05-03 SDOH — ECONOMIC STABILITY: TRANSPORTATION INSECURITY
IN THE PAST 12 MONTHS, HAS THE LACK OF TRANSPORTATION KEPT YOU FROM MEDICAL APPOINTMENTS OR FROM GETTING MEDICATIONS?: NO

## 2024-05-03 SDOH — SOCIAL STABILITY: SOCIAL INSECURITY: ABUSE: ADULT

## 2024-05-03 ASSESSMENT — COGNITIVE AND FUNCTIONAL STATUS - GENERAL
PATIENT BASELINE BEDBOUND: NO
CLIMB 3 TO 5 STEPS WITH RAILING: A LOT
HELP NEEDED FOR BATHING: A LITTLE
MOBILITY SCORE: 24
STANDING UP FROM CHAIR USING ARMS: A LITTLE
TOILETING: A LITTLE
DAILY ACTIVITIY SCORE: 24
DAILY ACTIVITIY SCORE: 22
MOVING TO AND FROM BED TO CHAIR: A LITTLE
MOBILITY SCORE: 19
WALKING IN HOSPITAL ROOM: A LITTLE

## 2024-05-03 ASSESSMENT — ACTIVITIES OF DAILY LIVING (ADL)
DRESSING YOURSELF: NEEDS ASSISTANCE
BATHING: INDEPENDENT
GROOMING: INDEPENDENT
PATIENT'S MEMORY ADEQUATE TO SAFELY COMPLETE DAILY ACTIVITIES?: YES
TOILETING: INDEPENDENT
JUDGMENT_ADEQUATE_SAFELY_COMPLETE_DAILY_ACTIVITIES: YES
FEEDING YOURSELF: INDEPENDENT
WALKS IN HOME: INDEPENDENT
ADEQUATE_TO_COMPLETE_ADL: YES
HEARING - RIGHT EAR: HEARING AID
ASSISTIVE_DEVICE: HEARING AID - RIGHT;HEARING AID - LEFT
HEARING - LEFT EAR: HEARING AID

## 2024-05-03 ASSESSMENT — PAIN SCALES - GENERAL
PAINLEVEL_OUTOF10: 0 - NO PAIN

## 2024-05-03 ASSESSMENT — LIFESTYLE VARIABLES
SKIP TO QUESTIONS 9-10: 1
HOW OFTEN DO YOU HAVE 6 OR MORE DRINKS ON ONE OCCASION: NEVER
AUDIT-C TOTAL SCORE: 0
AUDIT-C TOTAL SCORE: 0
HOW MANY STANDARD DRINKS CONTAINING ALCOHOL DO YOU HAVE ON A TYPICAL DAY: PATIENT DOES NOT DRINK
HOW OFTEN DO YOU HAVE A DRINK CONTAINING ALCOHOL: NEVER

## 2024-05-03 ASSESSMENT — ENCOUNTER SYMPTOMS
VOMITING: 0
COUGH: 0
HEMATURIA: 0
PALPITATIONS: 0
WEAKNESS: 0
WHEEZING: 0
SHORTNESS OF BREATH: 0
VOMITING: 0
SHORTNESS OF BREATH: 1
NECK PAIN: 0
WHEEZING: 1
CONFUSION: 0
SEIZURES: 0
IRREGULAR HEARTBEAT: 0
CHEST TIGHTNESS: 0
ABDOMINAL PAIN: 0
BACK PAIN: 0
ORTHOPNEA: 0
ALTERED MENTAL STATUS: 0
DYSPNEA ON EXERTION: 1
NUMBNESS: 0
NAUSEA: 0
DYSURIA: 0
COUGH: 1
FLANK PAIN: 0
EYES NEGATIVE: 1
CHILLS: 0
SYNCOPE: 0
SPEECH DIFFICULTY: 0
BLOOD IN STOOL: 0
PALPITATIONS: 0
POLYDIPSIA: 0
NAUSEA: 0
SORE THROAT: 0
DIZZINESS: 0
CHILLS: 0
FEVER: 0
NEAR-SYNCOPE: 0
FEVER: 0
FATIGUE: 1
HEMATOCHEZIA: 0

## 2024-05-03 ASSESSMENT — PAIN - FUNCTIONAL ASSESSMENT
PAIN_FUNCTIONAL_ASSESSMENT: 0-10
PAIN_FUNCTIONAL_ASSESSMENT: 0-10

## 2024-05-03 ASSESSMENT — PATIENT HEALTH QUESTIONNAIRE - PHQ9
2. FEELING DOWN, DEPRESSED OR HOPELESS: NOT AT ALL
SUM OF ALL RESPONSES TO PHQ9 QUESTIONS 1 & 2: 0
1. LITTLE INTEREST OR PLEASURE IN DOING THINGS: NOT AT ALL

## 2024-05-03 NOTE — PROGRESS NOTES
Chief Complaint/Reason for Visit:  No chief complaint on file. 6 month cardiovascular follow up    History Of Present Illness:    Abigail Aquino is a 79 y.o. female that presents to the office for 6 month follow up.    Taking medications as prescribed.     PMH is significant for CAD s/p PCI x1 in 2009, PE in 11/2021, hyperlipidemia, HTN, ulcerative colitis, COPD - wears home oxygen and lung cancer.     She was admitted to [facility] 5-2/24- *** for COPD exacerbation.    Past Medical History:  She has a past medical history of Clotting disorder (Multi), COPD (chronic obstructive pulmonary disease) (Multi), Eyebrow laceration, right, sequela (10/04/2023), Fracture tibia/fibula, right, sequela (10/11/2021), Hypertension, Kidney stones (05/09/2017), and Solitary pulmonary nodule (09/06/2023).    She has no past medical history of Diabetes mellitus (Multi).    Past Surgical History:  She has a past surgical history that includes Tonsillectomy; Appendectomy; and CT guided percutaneous biopsy lung (12/21/2023).      Social History:  She reports that she quit smoking about 8 months ago. Her smoking use included cigarettes. She has been exposed to tobacco smoke. She has never used smokeless tobacco. She reports that she does not drink alcohol and does not use drugs.    Family History:  Family History   Problem Relation Name Age of Onset    Cancer Daughter          Allergies:  Atorvastatin and Oxycodone    Review of Systems   Constitutional: Negative for chills and fever.   Cardiovascular:  Positive for dyspnea on exertion (chronic). Negative for chest pain, irregular heartbeat, leg swelling, near-syncope, orthopnea, palpitations and syncope.   Respiratory:  Negative for cough, shortness of breath and wheezing.    Gastrointestinal:  Negative for hematochezia, melena, nausea and vomiting.   Genitourinary:  Negative for hematuria.   Psychiatric/Behavioral:  Negative for altered mental status.        Objective       Vitals reviewed.   Constitutional:       Appearance: Not in distress.   Neck:      Vascular: No carotid bruit.   Pulmonary:      Effort: Pulmonary effort is normal.      Breath sounds: Normal breath sounds.   Cardiovascular:      PMI at left midclavicular line. Normal rate. Regular rhythm. S1 with normal intensity. S2 with normal intensity.       Murmurs: There is no murmur.   Edema:     Peripheral edema absent.   Abdominal:      General: Bowel sounds are normal.   Neurological:      Mental Status: Alert and oriented to person, place and time.         Current Outpatient Medications   Medication Instructions    albuterol 90 mcg/actuation inhaler INHALE 2 PUFFS BY MOUTH FOUR TIMES A DAY AS NEEDED FOR SHORTNESS OF BREATH    azithromycin (ZITHROMAX) 250 mg, oral, Daily    budesonide EC (ENTOCORT EC) 3 mg, oral, Every morning    budesonide-glycopyr-formoterol (Breztri Aerosphere) 160-9-4.8 mcg/actuation HFA aerosol inhaler 2 puffs, inhalation, 2 times daily RT    Eliquis 5 mg, oral, 2 times daily    fluticasone furoate-vilanteroL (Breo Ellipta) 200-25 mcg/dose inhaler 1 puff, inhalation, Daily RT    furosemide (Lasix) 20 mg tablet TAKE 1 TABLET BY MOUTH ONCE DAILY    ipratropium-albuteroL (Duo-Neb) 0.5-2.5 mg/3 mL nebulizer solution 3 mL, nebulization, 4 times daily PRN    isosorbide mononitrate ER (IMDUR) 30 mg, oral, Daily    lidocaine (Lidoderm) 5 % patch Place 1 patch on the skin once daily. Leave on for 12 hours, remove for 12 hours    metoprolol tartrate (LOPRESSOR) 50 mg, oral, 2 times daily    pravastatin (PRAVACHOL) 40 mg, oral, Daily    predniSONE (DELTASONE) 40 mg, oral, Daily    tiotropium (Spiriva Respimat) 2.5 mcg/actuation inhaler 2 Inhalations, inhalation, Daily        Last Labs:  CBC -  Lab Results   Component Value Date    WBC 8.5 05/02/2024    HGB 14.4 05/02/2024    HCT 44.2 05/02/2024    MCV 98 05/02/2024     05/02/2024       RENAL FUNCTION PANEL -   Lab Results   Component Value Date     GLUCOSE 118 (H) 05/02/2024     05/02/2024    K 3.7 05/02/2024    CL 99 05/02/2024    CO2 37 (H) 05/02/2024    ANIONGAP 10 05/02/2024    BUN 8 05/02/2024    CREATININE 0.56 05/02/2024    CALCIUM 9.5 05/02/2024    PHOS 2.8 10/11/2021    ALBUMIN 4.1 05/02/2024        CMP -  Lab Results   Component Value Date    CALCIUM 9.5 05/02/2024    PHOS 2.8 10/11/2021    PROT 6.8 05/02/2024    ALBUMIN 4.1 05/02/2024    AST 15 05/02/2024    ALT 9 05/02/2024    ALKPHOS 70 05/02/2024    BILITOT 0.5 05/02/2024       LIPID PANEL -   Lab Results   Component Value Date    CHOL 183 11/17/2023    TRIG 148 11/17/2023    HDL 61.4 11/17/2023    CHHDL 3.0 11/17/2023    VLDL 30 11/17/2023    NHDL 122 11/17/2023     Lab Results   Component Value Date    LDLCALC 92 11/17/2023       Lab Results   Component Value Date     (H) 05/02/2024    HGBA1C 6.2 (H) 02/10/2024       Lab Results   Component Value Date    TSH 2.44 10/18/2021       No results found for this or any previous visit.     Last Cardiology Tests:    Echo 8/28/23:   1. Left ventricular systolic function is normal with a 60-65% estimated ejection fraction.     Visit Vitals  OB Status Postmenopausal   Smoking Status Former       Assessment/Plan   There were no encounter diagnoses.    1. CAD s/p PCI (unknown vessel) in 2009  She does not take any antiplatelets.  She does take apixaban for h/o PE  Increase pravastatin 80 mg daily   Continue Metoprolol tartrate 50 mg BID  TTE Aug 2023 with LVEF 60-65%     2. Dyslipidemia  Goal LDL <70.  Currently not at goal.  She has allergy listed to atorvastatin  Recommend Mediterranean style of eating  Increase pravastatin 80 mg daily.    3. HTN   Stable  Continue current antihypertensives: furosemide 20 mg daily, isosorbide mononitrate 30 mg daily and metoprolol tartrate 50 mg BID    4. H/o PE Nov 2021    5. Lung adenocarcinoma    6. COPD, wears home oxygen    Lydia Madden, APRN-CNP

## 2024-05-03 NOTE — PROGRESS NOTES
Patient was signed out to me to metabolize lorazepam.  She was given lorazepam so that she can have imaging.  She was observed in the emergency department.  She was given 0.2 mg of flumazenil.  She is awake and alert.  She is comfortable with being discharged.  She told to follow-up with the pulmonologist.  She will be placed on azithromycin 250 mg once a day she can take that for 4 days.  She be placed on prednisone 40 mg once a day for 7 days.  Shoulder return for any worsening dyspnea.  Currently her respiratory rate is 18 and she is oxygen saturation is 93% on 3 L nasal cannula.    0250   Plan was for patient to be discharged and nurses assisted her to the bedside commode and her oxygen saturated in the 70s.  They got her back in bed and she was very tachypneic with a respiratory rate of 28 and increased work of breathing and dyspnea.  I ordered a DuoNeb aerosol.  This is consistent with her story that she is not been able to be very functional or ambulatory over the past couple days.  Discussed case with hospitalist and the patient be admitted to the hospital for COPD exacerbation and increased oxygen requirement.  Baseline is 3 L nasal cannula.  I increased her to 4 L and then now 5 L nasal cannula.

## 2024-05-03 NOTE — PROGRESS NOTES
Abigail Aquino [id number]   Service: Internal Medicine / Hospitalist Date of service: 5/3/2024                          Full Code        Subjective    History Of Present Illness  Abigail Aquino is a 79 y.o. female with past medical history of O2 dependent COPD/emphysema, coronary artery disease, adenocarcinoma of the left lung, remote pulmonary emboli, hypertension, dyslipidemia presenting with increasing shortness of breath.  Patient states that she has intermittent episodes where she just will desat into the 70s at home with minimal exertion.  She is able to rest and recover.  She states that the last day and a half she has noted wheezing and increasing shortness of breath and less endurance.  She denies having any chest pain with this she denies having any fevers or chills.  Patient does have a cough.  In the emergency room patient was given multiple breathing treatments.  Chest x-ray does not show any acute findings but shows evidence of her untreated adenocarcinoma of the lung.  Patient's condition improved and was being set up for discharge when she got up to get ready to leave she desatted to 75 on her O2 supplementation.  Patient is now on 4 L nasal cannula satting 94%.  When the patient has no functional reserves with desaturation with minimal activity and lives alone with no significant support other than a family across the street will help patient for payment patient is being admitted to the hospital for COPD exacerbation.  She will be placed on IV steroids beta agonist.  We will ask  to look into resources for patient whether it is home health order for placement.    5/3..............The patient voiced her frustration concerning her respiratory status.  Reports that she felt a bit better since admission but admits to recurrence of COPD exacerbation.  No reported : fevers, chills, homicidal ideation, suicidal ideation, chest pains palpitations, nausea or vomiting.      Review of  Systems:   Review of system otherwise negative if not aforementioned above in subjective.    Objective      Physical Exam     Constitutional:       Appearance: Patient appeared in no acute cardiopulmonary distress.     Comments: Patient alert and oriented to person place time and situation.  HEENT:      Head: Normocephalic and atraumatic.Trachea midline      Nose:No observed congestion or rhinorrhea.     Mouth/Throat: Mucous membranes Moist, Trachea appeared  midline.  Eyes:      Extraocular Movements: Extraocular movements intact.      Pupils: Pupils are equal, round, and reactive to light.      Comments: No scleral icterus or conjunctival injection appreciated.   Cardiovascular:      Rate and Rhythm: Normal rate and regular rhythm. No clicks rubs or gallops, normal S1 and S2.No peripheral stigmata of endocarditis appreciated.     Pulmonary:      Scattered wheezes appreciated, bases diminished.  Abdominal:      General: Abdomen soft, nontender, active bowel sounds, no involuntary guarding or rebound tenderness appreciated.     Comments: None   Musculoskeletal:       Patient appeared to have full active range of motion for upper and lower extremities, no acute apparent joint deformity appreciated on examination.   No pitting edema or cyanosis appreciated.       Lymphadenopathy:      No appreciable palpable lymphadenopathy  Skin:     General: Skin is warm.      Coloration:  No jaundice     Findings: No abnormal appearing skin rashes or lesions that appeared acute noted on unclothed area of the skin..   Neurological:      General: No focal sensory or motor deficits appreciated, no meningeal signs or dysmetria noted.      Cranial Nerves: Cranial nerves II to XII appearing grossly intact.     Genitals:  Deferred  Psychiatric:         The patient appears to be displaying normal mood and affect at the time of evaluation.    Labs:     Lab Results   Component Value Date    GLUCOSE 118 (H) 05/02/2024    CALCIUM 9.5  05/02/2024     05/02/2024    K 3.7 05/02/2024    CO2 37 (H) 05/02/2024    CL 99 05/02/2024    BUN 8 05/02/2024    CREATININE 0.56 05/02/2024      Lab Results   Component Value Date    WBC 8.5 05/02/2024    HGB 14.4 05/02/2024    HCT 44.2 05/02/2024    MCV 98 05/02/2024     05/02/2024      [unfilled]   [unfilled]   No results found for the last 90 days.              X-rays/ Images    [unfilled]   Procedure Component Value Units Date/Time   CT angio chest for pulmonary embolism [[id number]] Collected: 05/02/24 2347   Order Status: Completed Updated: 05/02/24 2347   Narrative:     Interpreted By:  Froy Ayon,  STUDY:  CT ANGIO CHEST FOR PULMONARY EMBOLISM;  5/2/2024 11:12 pm      INDICATION:  Signs/Symptoms:Shortness of breath, history of lung cancer in  addition to prior PE.      COMPARISON:  CT scan of the chest 12/28/2023      ACCESSION NUMBER(S):  [id number]      ORDERING CLINICIAN:  DAKSHA SALAS      TECHNIQUE:  Helical data acquisition of the chest was obtained after intravenous  administration of  75 mL of Omnipaque 350. Images were reformatted in  axial, coronal, and sagittal planes. Axial and coronal MIPS were  reconstructed and reviewed.      FINDINGS:  HEART AND VESSELS:  No acute pulmonary embolism to the  segmental arterial level.      Main pulmonary artery and its branches are normal in caliber.      The thoracic aorta is of normal course and caliber  with diffuse  vascular calcifications.      Severe coronary artery calcifications are seen.The study is not  optimized for evaluation of coronary arteries.      The cardiac chambers are not enlarged.      No evidence of pericardial effusion.      MEDIASTINUM AND SHELIA, LOWER NECK AND AXILLA:  The visualized thyroid gland is within normal limits.      No evidence of thoracic lymphadenopathy by CT criteria.      Esophagus appears within normal limits as seen.      LUNGS AND AIRWAYS:  The trachea and central airways are patent. No  endobronchial lesion.      Respiratory motion artifact limits evaluation of the lung parenchyma.  Centrilobular and paraseptal emphysema noted. There is  redemonstration of a lobulated 1.3 x 2.9 cm lesion in the left lower  lobe which is stable from prior CT. Additional 4-5 mm pulmonary  nodules are noted 1 of which in the right lower lobe seen on axial  image 114 of 358 appears to be new. No consolidation, effusion or  pneumothorax. Expiratory phase of imaging.      UPPER ABDOMEN:  Question subtle nodular contour of the liver. Subcentimeter hypodense  focus in the left hepatic lobe is too small to characterize. There is  a 2.3 cm hypodense lesion in the periphery of the right hepatic lobe  demonstrating fluid attenuation suggestive of a cyst. Nodular  thickening of the adrenal glands may relate to hyperplasia or  adenomatous change.      CHEST WALL AND OSSEOUS STRUCTURES:  Generalized diffuse osteopenia. There is moderate compression  deformity of the L1 vertebral body with approximate 50% loss of  vertebral body height and 5 mm retropulsion into the canal. Findings  are stable from prior lumbar spine x-rays 04/11/2024. Multilevel  degenerative changes are present.       Impression:     1. No acute pulmonary embolism to the segmental arterial level.  2. Centrilobular and paraseptal emphysema noted. There is a 1.3 x 2.9  cm lobulated nodule in the left lower lobe consistent with patient's  known history of malignancy. There several additional 4-5 mm nodules  1 of which appears to be new as described above. Attention on  continued follow-up is advised.  3. Question subtle nodularity of the liver contour which can be seen  in the setting of cirrhosis. Correlate with liver function tests.  4. Additional findings as noted above.      MACRO:  None      Signed by: Froy Ayon 5/2/2024 11:46 PM  Dictation workstation:   [id number]   XR chest 1 view [[id number]] Collected: 05/02/24 2032   Order Status: Completed Updated:  05/02/24 2034   Narrative:     Interpreted By:  Rigoberto Terrazas,  STUDY:  XR CHEST 1 VIEW;  5/2/2024 8:06 pm      INDICATION:  Signs/Symptoms:SOB.      COMPARISON:  02/09/2024      ACCESSION NUMBER(S):  [id number]      ORDERING CLINICIAN:  DAKSHA SALAS      FINDINGS:          The cardiomediastinal silhouette and pulmonary vasculature are within  normal limits. There is redemonstration of a solid pulmonary nodule  in the left lower lobe similar to prior study. No new airspace  opacity. No pleural effusion. No consolidation, pleural effusion or  pneumothorax.           Impression:     No acute cardiopulmonary process.      Redemonstration of left lower lobe pulmonary malignancy. Overall no  significant change in size radiographically from 02/09/2024.  Continued oncological follow-up recommended.      MACRO:  None.      Signed by: Rigoberto Terrazas 5/2/2024 8:31 PM  Dictation workstation:   [id number]             Medical Problems       Problem List       * (Principal) COPD exacerbation (Multi)    Atherosclerotic heart disease of native coronary artery without angina pectoris    Overview Signed 10/4/2023 11:53 AM by Vikki Marquez MD     On IsosorbideER 30 mg daily, Furosemide 20 mg daily, Pravastatin. No seen cardiology in one year. Cardiologist was in [place]. She needs new cardiologist.          Chronic obstructive pulmonary disease, unspecified (Multi)    Overview Signed 12/14/2023 11:57 AM by Marine Levi     Continue home medications.         Difficulty in walking, not elsewhere classified    Mixed hyperlipidemia    Ulcerative colitis, unspecified, without complications (Multi)    Overview Signed 10/4/2023 12:02 PM by Vikki Marquez MD     Diagnosed 2009, has been on Budesonide. Last GI follow up was in 2016. She has not had follow up. She needs new gastro in local area per patient. Prior was in [place].          Presence of stent in coronary artery    Overview Signed 10/4/2023 11:47 AM  by Vikki Marquez MD     2009,          History of pulmonary embolism    Overview Signed 10/4/2023 12:00 PM by Vikki Marquez MD     Reviewed old records. 10/6/2021, had CT contrast that showed no PE but did show nodules. Lost to follow up. In Hospital 8/26, repeat CT with increase in lung mass and no PE. She does have issues with falling and has been on Eliquis since after her fibula fracture in 2021. Do not feel she has indication for long term use and risk outweighs benefit.         Cigarette smoker    Overview Signed 10/4/2023 12:28 PM by Vikki Marquez MD     Started 1956. Has cut back to intermittent only. 2 ppd until recently. 67 times 2 ppd so 124 pack years.  Smoked every day until 5 weeks ago. Now only couple a week. Discussed nicotine replacement. She does not want at this time.          Lung nodules    Lymphocytic colitis    Other acute pulmonary embolism, unspecified whether acute cor pulmonale present (Multi)    Primary cancer of left lower lobe of lung (Multi)    Nicotine dependence    Chronic respiratory failure with hypoxia (Multi)    Age-related physical debility    Long term (current) use of anticoagulants    Hypertensive heart disease with heart failure (Multi)    Pathological fracture due to osteoporosis    Compression fracture of lumbar vertebra (Multi)    Elevated hemoglobin A1c               Above medical problems may be reflective of historical medical problems that may have resolved and may not related to acute clinical condition/medical problems.    Clinical impression/plan:    Assessment and Plan  #1 COPD exacerbation  Patient with desaturation with minimal exertion.  Patient is to continue with O2 support as necessary.  Patient chronically on 3 L at home currently requiring 4 L.  Patient will be placed on IV steroids as well as beta agonist.  Patient will be placed on doxycycline empirically.     #2 adenocarcinoma of the lung  Patient was diagnosed with this back in  February but has yet to have any treatment for this.  I suspect that patient is delaying any type of treatment.  She states that she wants to get new glasses and hearing aids done before she has any type of chemo.  Patient is having a hard time getting to and from appointments etc. patient is making excuses.  Patient has no chest pain or pleuritic discomfort from the lung malignancy and I think she is just ending and I will and maybe does not want anything to be done at all with that realizing that she does not have any type of quality of life because she did has no exertional capabilities at present time.     #3 remote history of pulmonary emboli  Patient on chronic anticoagulation with Eliquis     #4 coronary artery disease  Not certain when last stress test was performed.  Patient is not having any chest pain with exertion just increasing amount of shortness of breath with minimal amount of exertion.  She is desatting during these events doubt if it is ischemia induced dyspnea.     #5 hypertension  Resume home meds      Disposition/additional care plan/interventions: 5/3/2024       Continue treatment for COPD exacerbation.    Monitor volume status consider possible addition of diuretics as clinically needed.    Moderate to severe COPD, agree with antibiotic therapy    Anticoagulation with Eliquis    Social service consult concerning discharge planning    As needed anxiolytics      Patient appeared to have overall poor respiratory reserve.  Anticipate slow recovery from COPD exacerbation will continue close monitoring    The patient was informed of differential diagnosis , work up , plan of care and possible sequelae of clinical disposition.Patient in agreement with plan of care. Further recommendations forthcoming in accordance with patient's clinical disposition and response to care.    Discharge planning:Discharge timing to be determined.    Care time: >55 mins           Dictation performed with assistance of  voice recognition device therefore transcription errors are possible.

## 2024-05-03 NOTE — CARE PLAN
Problem: Fall/Injury  Goal: Not fall by end of shift  Outcome: Progressing  Goal: Be free from injury by end of the shift  Outcome: Progressing  Goal: Verbalize understanding of personal risk factors for fall in the hospital  Outcome: Progressing  Goal: Verbalize understanding of risk factor reduction measures to prevent injury from fall in the home  Outcome: Progressing  Goal: Use assistive devices by end of the shift  Outcome: Progressing  Goal: Pace activities to prevent fatigue by end of the shift  Outcome: Progressing     Problem: Pain  Goal: My pain/discomfort is manageable  Outcome: Progressing     Problem: Safety  Goal: Patient will be injury free during hospitalization  Outcome: Progressing  Goal: I will remain free of falls  Outcome: Progressing     Problem: Daily Care  Goal: Daily care needs are met  Outcome: Progressing     Problem: Psychosocial Needs  Goal: Demonstrates ability to cope with hospitalization/illness  Outcome: Progressing  Goal: Collaborate with me, my family, and caregiver to identify my specific goals  Outcome: Progressing     Problem: Discharge Barriers  Goal: My discharge needs are met  Outcome: Progressing

## 2024-05-03 NOTE — TELEPHONE ENCOUNTER
Val, a nurse from [facility] is requesting a verbal for the patient to have a home health aid to come to the patients house for 6 hours every week.

## 2024-05-03 NOTE — H&P
History Of Present Illness  Abigail Aquino is a 79 y.o. female with past medical history of O2 dependent COPD/emphysema, coronary artery disease, adenocarcinoma of the left lung, remote pulmonary emboli, hypertension, dyslipidemia presenting with increasing shortness of breath.  Patient states that she has intermittent episodes where she just will desat into the 70s at home with minimal exertion.  She is able to rest and recover.  She states that the last day and a half she has noted wheezing and increasing shortness of breath and less endurance.  She denies having any chest pain with this she denies having any fevers or chills.  Patient does have a cough.  In the emergency room patient was given multiple breathing treatments.  Chest x-ray does not show any acute findings but shows evidence of her untreated adenocarcinoma of the lung.  Patient's condition improved and was being set up for discharge when she got up to get ready to leave she desatted to 75 on her O2 supplementation.  Patient is now on 4 L nasal cannula satting 94%.  When the patient has no functional reserves with desaturation with minimal activity and lives alone with no significant support other than a family across the street will help patient for payment patient is being admitted to the hospital for COPD exacerbation.  She will be placed on IV steroids beta agonist.  We will ask  to look into resources for patient whether it is home health order for placement.     Past Medical History  O2 dependent COPD/emphysema, coronary artery disease, adenocarcinoma of the left lung, remote pulmonary emboli, hypertension, dyslipidemia    Surgical History  She has a past surgical history that includes Tonsillectomy; Appendectomy; and CT guided percutaneous biopsy lung (12/21/2023).     Social History  She reports that she quit smoking about 8 months ago. Her smoking use included cigarettes. She has been exposed to tobacco smoke. She has never  used smokeless tobacco. She reports that she does not drink alcohol and does not use drugs.    Family History  Family History   Problem Relation Name Age of Onset    Cancer Daughter          Allergies  Atorvastatin and Oxycodone    Meds  No current facility-administered medications for this encounter.    Current Outpatient Medications:     albuterol 90 mcg/actuation inhaler, INHALE 2 PUFFS BY MOUTH FOUR TIMES A DAY AS NEEDED FOR SHORTNESS OF BREATH, Disp: 18 g, Rfl: 1    azithromycin (Zithromax) 250 mg tablet, Take 1 tablet (250 mg) by mouth once daily for 4 days., Disp: 4 tablet, Rfl: 0    budesonide EC (Entocort EC) 3 mg 24 hr capsule, Take 1 capsule (3 mg) by mouth once daily in the morning., Disp: , Rfl:     budesonide-glycopyr-formoterol (Breztri Aerosphere) 160-9-4.8 mcg/actuation HFA aerosol inhaler, Inhale 2 puffs 2 times a day., Disp: 24 g, Rfl: 11    Eliquis 5 mg tablet, Take 1 tablet (5 mg) by mouth 2 times a day., Disp: , Rfl:     fluticasone furoate-vilanteroL (Breo Ellipta) 200-25 mcg/dose inhaler, Inhale 1 puff once daily., Disp: , Rfl:     furosemide (Lasix) 20 mg tablet, TAKE 1 TABLET BY MOUTH ONCE DAILY, Disp: 90 tablet, Rfl: 0    ipratropium-albuteroL (Duo-Neb) 0.5-2.5 mg/3 mL nebulizer solution, Take 3 mL by nebulization 4 times a day as needed for wheezing or shortness of breath. (Patient not taking: Reported on 4/11/2024), Disp: 360 mL, Rfl: 11    isosorbide mononitrate ER (Imdur) 30 mg 24 hr tablet, Take 1 tablet (30 mg) by mouth once daily., Disp: , Rfl:     lidocaine (Lidoderm) 5 % patch, Place 1 patch on the skin once daily. Leave on for 12 hours, remove for 12 hours, Disp: 10 patch, Rfl: 0    metoprolol tartrate (Lopressor) 50 mg tablet, Take 1 tablet by mouth 2 times a day., Disp: , Rfl:     pravastatin (Pravachol) 40 mg tablet, Take 1 tablet (40 mg) by mouth once daily., Disp: , Rfl:     predniSONE (Deltasone) 20 mg tablet, Take 2 tablets (40 mg) by mouth once daily for 7 days., Disp: 14  tablet, Rfl: 0    tiotropium (Spiriva Respimat) 2.5 mcg/actuation inhaler, Inhale 2 puffs once daily., Disp: , Rfl:     Review of Systems   Constitutional:  Positive for fatigue. Negative for chills and fever.   HENT:  Negative for congestion and sore throat.    Eyes: Negative.    Respiratory:  Positive for cough, shortness of breath and wheezing. Negative for chest tightness.    Cardiovascular:  Negative for palpitations and leg swelling.   Gastrointestinal:  Negative for abdominal pain, blood in stool, nausea and vomiting.   Endocrine: Negative for polydipsia and polyuria.   Genitourinary:  Negative for dysuria and flank pain.   Musculoskeletal:  Negative for back pain, gait problem and neck pain.   Skin: Negative.    Neurological:  Negative for dizziness, seizures, speech difficulty, weakness and numbness.   Psychiatric/Behavioral:  Negative for confusion.         Physical Exam  Constitutional:       Appearance: Normal appearance. She is not ill-appearing or toxic-appearing.   HENT:      Head: Normocephalic and atraumatic.      Mouth/Throat:      Mouth: Mucous membranes are moist.   Eyes:      Extraocular Movements: Extraocular movements intact.      Pupils: Pupils are equal, round, and reactive to light.   Cardiovascular:      Rate and Rhythm: Normal rate and regular rhythm.      Pulses: Normal pulses.      Heart sounds: Normal heart sounds. No murmur heard.  Pulmonary:      Breath sounds: No stridor. Wheezing present. No rhonchi or rales.   Chest:      Chest wall: No tenderness.   Abdominal:      General: Abdomen is flat. Bowel sounds are normal.      Palpations: Abdomen is soft.      Tenderness: There is no abdominal tenderness.   Musculoskeletal:         General: Normal range of motion.   Skin:     Coloration: Skin is not jaundiced.      Findings: No erythema or rash.   Neurological:      General: No focal deficit present.      Mental Status: She is alert and oriented to person, place, and time.    Psychiatric:         Mood and Affect: Mood normal.         Thought Content: Thought content normal.         Judgment: Judgment normal.          Last Recorded Vitals  /89 (BP Location: Right arm, Patient Position: Lying)   Pulse 98   Temp 36.8 °C (98.2 °F)   Resp 18   Wt 58.1 kg (128 lb)   SpO2 94%     Relevant Results     Results for orders placed or performed during the hospital encounter of 05/02/24 (from the past 24 hour(s))   BLOOD GAS VENOUS FULL PANEL   Result Value Ref Range    POCT pH, Venous 7.32 (L) 7.33 - 7.43 pH    POCT pCO2, Venous 78 (HH) 41 - 51 mm Hg    POCT pO2, Venous 28 (L) 35 - 45 mm Hg    POCT SO2, Venous 42 (L) 45 - 75 %    POCT Oxy Hemoglobin, Venous 40.4 (L) 45.0 - 75.0 %    POCT Hematocrit Calculated, Venous 44.0 36.0 - 46.0 %    POCT Sodium, Venous 139 136 - 145 mmol/L    POCT Potassium, Venous 4.0 3.5 - 5.3 mmol/L    POCT Chloride, Venous 99 98 - 107 mmol/L    POCT Ionized Calicum, Venous 1.23 1.10 - 1.33 mmol/L    POCT Glucose, Venous 118 (H) 74 - 99 mg/dL    POCT Lactate, Venous 1.0 0.4 - 2.0 mmol/L    POCT Base Excess, Venous 10.4 (H) -2.0 - 3.0 mmol/L    POCT HCO3 Calculated, Venous 40.2 (H) 22.0 - 26.0 mmol/L    POCT Hemoglobin, Venous 14.6 12.0 - 16.0 g/dL    POCT Anion Gap, Venous 4.0 (L) 10.0 - 25.0 mmol/L    Patient Temperature 37.0 degrees Celsius    FiO2 32 %   CBC and Auto Differential   Result Value Ref Range    WBC 8.5 4.4 - 11.3 x10*3/uL    nRBC 0.0 0.0 - 0.0 /100 WBCs    RBC 4.50 4.00 - 5.20 x10*6/uL    Hemoglobin 14.4 12.0 - 16.0 g/dL    Hematocrit 44.2 36.0 - 46.0 %    MCV 98 80 - 100 fL    MCH 32.0 26.0 - 34.0 pg    MCHC 32.6 32.0 - 36.0 g/dL    RDW 13.2 11.5 - 14.5 %    Platelets 240 150 - 450 x10*3/uL    Neutrophils % 60.4 40.0 - 80.0 %    Immature Granulocytes %, Automated 0.4 0.0 - 0.9 %    Lymphocytes % 23.3 13.0 - 44.0 %    Monocytes % 8.4 2.0 - 10.0 %    Eosinophils % 6.6 0.0 - 6.0 %    Basophils % 0.9 0.0 - 2.0 %    Neutrophils Absolute 5.14 1.60 -  5.50 x10*3/uL    Immature Granulocytes Absolute, Automated 0.03 0.00 - 0.50 x10*3/uL    Lymphocytes Absolute 1.98 0.80 - 3.00 x10*3/uL    Monocytes Absolute 0.71 0.05 - 0.80 x10*3/uL    Eosinophils Absolute 0.56 (H) 0.00 - 0.40 x10*3/uL    Basophils Absolute 0.08 0.00 - 0.10 x10*3/uL   Protime-INR   Result Value Ref Range    Protime 13.7 (H) 9.8 - 12.8 seconds    INR 1.2 (H) 0.9 - 1.1   Comprehensive Metabolic Panel   Result Value Ref Range    Glucose 118 (H) 74 - 99 mg/dL    Sodium 142 136 - 145 mmol/L    Potassium 3.7 3.5 - 5.3 mmol/L    Chloride 99 98 - 107 mmol/L    Bicarbonate 37 (H) 21 - 32 mmol/L    Anion Gap 10 10 - 20 mmol/L    Urea Nitrogen 8 6 - 23 mg/dL    Creatinine 0.56 0.50 - 1.05 mg/dL    eGFR >90 >60 mL/min/1.73m*2    Calcium 9.5 8.6 - 10.3 mg/dL    Albumin 4.1 3.4 - 5.0 g/dL    Alkaline Phosphatase 70 33 - 136 U/L    Total Protein 6.8 6.4 - 8.2 g/dL    AST 15 9 - 39 U/L    Bilirubin, Total 0.5 0.0 - 1.2 mg/dL    ALT 9 7 - 45 U/L   B-Type Natriuretic Peptide   Result Value Ref Range     (H) 0 - 99 pg/mL   Troponin I, High Sensitivity, Initial   Result Value Ref Range    Troponin I, High Sensitivity 7 0 - 13 ng/L   Troponin, High Sensitivity, 1 Hour   Result Value Ref Range    Troponin I, High Sensitivity 8 0 - 13 ng/L   Blood Gas Venous Full Panel   Result Value Ref Range    POCT pH, Venous 7.31 (L) 7.33 - 7.43 pH    POCT pCO2, Venous 77 (HH) 41 - 51 mm Hg    POCT pO2, Venous 43 35 - 45 mm Hg    POCT SO2, Venous 68 45 - 75 %    POCT Oxy Hemoglobin, Venous 65.9 45.0 - 75.0 %    POCT Hematocrit Calculated, Venous 41.0 36.0 - 46.0 %    POCT Sodium, Venous 138 136 - 145 mmol/L    POCT Potassium, Venous 3.3 (L) 3.5 - 5.3 mmol/L    POCT Chloride, Venous 100 98 - 107 mmol/L    POCT Ionized Calicum, Venous 1.21 1.10 - 1.33 mmol/L    POCT Glucose, Venous 133 (H) 74 - 99 mg/dL    POCT Lactate, Venous 0.8 0.4 - 2.0 mmol/L    POCT Base Excess, Venous 9.3 (H) -2.0 - 3.0 mmol/L    POCT HCO3 Calculated,  Venous 38.8 (H) 22.0 - 26.0 mmol/L    POCT Hemoglobin, Venous 13.7 12.0 - 16.0 g/dL    POCT Anion Gap, Venous 3.0 (L) 10.0 - 25.0 mmol/L    Patient Temperature 37.0 degrees Celsius    FiO2 32 %        Recent Imaging  CT angio chest for pulmonary embolism    Result Date: 5/2/2024  Interpreted By:  Froy Ayon, STUDY: CT ANGIO CHEST FOR PULMONARY EMBOLISM;  5/2/2024 11:12 pm   INDICATION: Signs/Symptoms:Shortness of breath, history of lung cancer in addition to prior PE.   COMPARISON: CT scan of the chest 12/28/2023   ACCESSION NUMBER(S): [id number]   ORDERING CLINICIAN: DAKSHA SALAS   TECHNIQUE: Helical data acquisition of the chest was obtained after intravenous administration of  75 mL of Omnipaque 350. Images were reformatted in axial, coronal, and sagittal planes. Axial and coronal MIPS were reconstructed and reviewed.   FINDINGS: HEART AND VESSELS: No acute pulmonary embolism to the  segmental arterial level.   Main pulmonary artery and its branches are normal in caliber.   The thoracic aorta is of normal course and caliber  with diffuse vascular calcifications.   Severe coronary artery calcifications are seen.The study is not optimized for evaluation of coronary arteries.   The cardiac chambers are not enlarged.   No evidence of pericardial effusion.   MEDIASTINUM AND SHELIA, LOWER NECK AND AXILLA: The visualized thyroid gland is within normal limits.   No evidence of thoracic lymphadenopathy by CT criteria.   Esophagus appears within normal limits as seen.   LUNGS AND AIRWAYS: The trachea and central airways are patent. No endobronchial lesion.   Respiratory motion artifact limits evaluation of the lung parenchyma. Centrilobular and paraseptal emphysema noted. There is redemonstration of a lobulated 1.3 x 2.9 cm lesion in the left lower lobe which is stable from prior CT. Additional 4-5 mm pulmonary nodules are noted 1 of which in the right lower lobe seen on axial image 114 of 358 appears to be new. No  consolidation, effusion or pneumothorax. Expiratory phase of imaging.   UPPER ABDOMEN: Question subtle nodular contour of the liver. Subcentimeter hypodense focus in the left hepatic lobe is too small to characterize. There is a 2.3 cm hypodense lesion in the periphery of the right hepatic lobe demonstrating fluid attenuation suggestive of a cyst. Nodular thickening of the adrenal glands may relate to hyperplasia or adenomatous change.   CHEST WALL AND OSSEOUS STRUCTURES: Generalized diffuse osteopenia. There is moderate compression deformity of the L1 vertebral body with approximate 50% loss of vertebral body height and 5 mm retropulsion into the canal. Findings are stable from prior lumbar spine x-rays 04/11/2024. Multilevel degenerative changes are present.       1. No acute pulmonary embolism to the segmental arterial level. 2. Centrilobular and paraseptal emphysema noted. There is a 1.3 x 2.9 cm lobulated nodule in the left lower lobe consistent with patient's known history of malignancy. There several additional 4-5 mm nodules 1 of which appears to be new as described above. Attention on continued follow-up is advised. 3. Question subtle nodularity of the liver contour which can be seen in the setting of cirrhosis. Correlate with liver function tests. 4. Additional findings as noted above.   MACRO: None   Signed by: Froy Ayon 5/2/2024 11:46 PM Dictation workstation:   [id number]    XR chest 1 view    Result Date: 5/2/2024  Interpreted By:  Rigoberto Terrazas, STUDY: XR CHEST 1 VIEW;  5/2/2024 8:06 pm   INDICATION: Signs/Symptoms:SOB.   COMPARISON: 02/09/2024   ACCESSION NUMBER(S): [id number]   ORDERING CLINICIAN: DAKSHA SALAS   FINDINGS:     The cardiomediastinal silhouette and pulmonary vasculature are within normal limits. There is redemonstration of a solid pulmonary nodule in the left lower lobe similar to prior study. No new airspace opacity. No pleural effusion. No consolidation, pleural effusion  or pneumothorax.         No acute cardiopulmonary process.   Redemonstration of left lower lobe pulmonary malignancy. Overall no significant change in size radiographically from 02/09/2024. Continued oncological follow-up recommended.   MACRO: None.   Signed by: Rigoberto Terrazas 5/2/2024 8:31 PM Dictation workstation:   [id number]      Assessment and Plan  #1 COPD exacerbation  Patient with desaturation with minimal exertion.  Patient is to continue with O2 support as necessary.  Patient chronically on 3 L at home currently requiring 4 L.  Patient will be placed on IV steroids as well as beta agonist.  Patient will be placed on doxycycline empirically.    #2 adenocarcinoma of the lung  Patient was diagnosed with this back in February but has yet to have any treatment for this.  I suspect that patient is delaying any type of treatment.  She states that she wants to get new glasses and hearing aids done before she has any type of chemo.  Patient is having a hard time getting to and from appointments etc. patient is making excuses.  Patient has no chest pain or pleuritic discomfort from the lung malignancy and I think she is just ending and I will and maybe does not want anything to be done at all with that realizing that she does not have any type of quality of life because she did has no exertional capabilities at present time.    #3 remote history of pulmonary emboli  Patient on chronic anticoagulation with Eliquis    #4 coronary artery disease  Not certain when last stress test was performed.  Patient is not having any chest pain with exertion just increasing amount of shortness of breath with minimal amount of exertion.  She is desatting during these events doubt if it is ischemia induced dyspnea.    #5 hypertension  Resume home meds        Bryant Rain MD

## 2024-05-04 LAB
ANION GAP SERPL CALC-SCNC: 9 MMOL/L (ref 10–20)
ATRIAL RATE: 80 BPM
BUN SERPL-MCNC: 15 MG/DL (ref 6–23)
CALCIUM SERPL-MCNC: 9.4 MG/DL (ref 8.6–10.3)
CHLORIDE SERPL-SCNC: 101 MMOL/L (ref 98–107)
CO2 SERPL-SCNC: 36 MMOL/L (ref 21–32)
CREAT SERPL-MCNC: 0.4 MG/DL (ref 0.5–1.05)
EGFRCR SERPLBLD CKD-EPI 2021: >90 ML/MIN/1.73M*2
ERYTHROCYTE [DISTWIDTH] IN BLOOD BY AUTOMATED COUNT: 13.5 % (ref 11.5–14.5)
GLUCOSE SERPL-MCNC: 164 MG/DL (ref 74–99)
HCT VFR BLD AUTO: 41 % (ref 36–46)
HGB BLD-MCNC: 13.5 G/DL (ref 12–16)
MCH RBC QN AUTO: 32.8 PG (ref 26–34)
MCHC RBC AUTO-ENTMCNC: 32.9 G/DL (ref 32–36)
MCV RBC AUTO: 100 FL (ref 80–100)
NRBC BLD-RTO: 0 /100 WBCS (ref 0–0)
P AXIS: 94 DEGREES
PLATELET # BLD AUTO: 226 X10*3/UL (ref 150–450)
POTASSIUM SERPL-SCNC: 4.3 MMOL/L (ref 3.5–5.3)
PR INTERVAL: 146 MS
Q ONSET: 249 MS
QRS COUNT: 13 BEATS
QRS DURATION: 88 MS
QT INTERVAL: 385 MS
QTC CALCULATION(BAZETT): 442 MS
QTC FREDERICIA: 422 MS
R AXIS: 73 DEGREES
RBC # BLD AUTO: 4.12 X10*6/UL (ref 4–5.2)
SODIUM SERPL-SCNC: 142 MMOL/L (ref 136–145)
T AXIS: 41 DEGREES
T OFFSET: 442 MS
VENTRICULAR RATE: 79 BPM
WBC # BLD AUTO: 9 X10*3/UL (ref 4.4–11.3)

## 2024-05-04 PROCEDURE — 2500000001 HC RX 250 WO HCPCS SELF ADMINISTERED DRUGS (ALT 637 FOR MEDICARE OP): Performed by: INTERNAL MEDICINE

## 2024-05-04 PROCEDURE — 82374 ASSAY BLOOD CARBON DIOXIDE: CPT | Performed by: INTERNAL MEDICINE

## 2024-05-04 PROCEDURE — 94640 AIRWAY INHALATION TREATMENT: CPT

## 2024-05-04 PROCEDURE — 2500000004 HC RX 250 GENERAL PHARMACY W/ HCPCS (ALT 636 FOR OP/ED): Performed by: INTERNAL MEDICINE

## 2024-05-04 PROCEDURE — 2500000004 HC RX 250 GENERAL PHARMACY W/ HCPCS (ALT 636 FOR OP/ED): Performed by: HOSPITALIST

## 2024-05-04 PROCEDURE — 2500000005 HC RX 250 GENERAL PHARMACY W/O HCPCS: Performed by: INTERNAL MEDICINE

## 2024-05-04 PROCEDURE — 36415 COLL VENOUS BLD VENIPUNCTURE: CPT | Performed by: INTERNAL MEDICINE

## 2024-05-04 PROCEDURE — 99233 SBSQ HOSP IP/OBS HIGH 50: CPT | Performed by: HOSPITALIST

## 2024-05-04 PROCEDURE — 2500000002 HC RX 250 W HCPCS SELF ADMINISTERED DRUGS (ALT 637 FOR MEDICARE OP, ALT 636 FOR OP/ED): Performed by: HOSPITALIST

## 2024-05-04 PROCEDURE — 1200000002 HC GENERAL ROOM WITH TELEMETRY DAILY

## 2024-05-04 PROCEDURE — 2500000001 HC RX 250 WO HCPCS SELF ADMINISTERED DRUGS (ALT 637 FOR MEDICARE OP): Performed by: HOSPITALIST

## 2024-05-04 PROCEDURE — 85027 COMPLETE CBC AUTOMATED: CPT | Performed by: INTERNAL MEDICINE

## 2024-05-04 RX ORDER — FUROSEMIDE 10 MG/ML
20 INJECTION INTRAMUSCULAR; INTRAVENOUS ONCE
Status: COMPLETED | OUTPATIENT
Start: 2024-05-04 | End: 2024-05-04

## 2024-05-04 RX ADMIN — FUROSEMIDE 20 MG: 10 INJECTION, SOLUTION INTRAMUSCULAR; INTRAVENOUS at 15:25

## 2024-05-04 RX ADMIN — METOPROLOL TARTRATE 50 MG: 50 TABLET, FILM COATED ORAL at 08:15

## 2024-05-04 RX ADMIN — Medication 4 L/MIN: at 08:00

## 2024-05-04 RX ADMIN — LORAZEPAM 0.5 MG: 0.5 TABLET ORAL at 22:14

## 2024-05-04 RX ADMIN — DOCUSATE SODIUM 100 MG: 100 CAPSULE, LIQUID FILLED ORAL at 08:15

## 2024-05-04 RX ADMIN — DOXYCYCLINE 100 MG: 100 INJECTION, POWDER, LYOPHILIZED, FOR SOLUTION INTRAVENOUS at 22:14

## 2024-05-04 RX ADMIN — IPRATROPIUM BROMIDE AND ALBUTEROL SULFATE 3 ML: 2.5; .5 SOLUTION RESPIRATORY (INHALATION) at 20:25

## 2024-05-04 RX ADMIN — METHYLPREDNISOLONE SODIUM SUCCINATE 40 MG: 40 INJECTION, POWDER, FOR SOLUTION INTRAMUSCULAR; INTRAVENOUS at 08:15

## 2024-05-04 RX ADMIN — IPRATROPIUM BROMIDE AND ALBUTEROL SULFATE 3 ML: 2.5; .5 SOLUTION RESPIRATORY (INHALATION) at 11:00

## 2024-05-04 RX ADMIN — PRAVASTATIN SODIUM 40 MG: 40 TABLET ORAL at 08:15

## 2024-05-04 RX ADMIN — IPRATROPIUM BROMIDE AND ALBUTEROL SULFATE 3 ML: 2.5; .5 SOLUTION RESPIRATORY (INHALATION) at 07:06

## 2024-05-04 RX ADMIN — DOCUSATE SODIUM 100 MG: 100 CAPSULE, LIQUID FILLED ORAL at 21:00

## 2024-05-04 RX ADMIN — IPRATROPIUM BROMIDE AND ALBUTEROL SULFATE 3 ML: 2.5; .5 SOLUTION RESPIRATORY (INHALATION) at 14:59

## 2024-05-04 RX ADMIN — ISOSORBIDE MONONITRATE 30 MG: 30 TABLET, EXTENDED RELEASE ORAL at 08:15

## 2024-05-04 RX ADMIN — LORAZEPAM 0.5 MG: 0.5 TABLET ORAL at 12:50

## 2024-05-04 RX ADMIN — Medication 5 L/MIN: at 20:25

## 2024-05-04 RX ADMIN — GUAIFENESIN AND DEXTROMETHORPHAN 5 ML: 100; 10 SYRUP ORAL at 12:50

## 2024-05-04 RX ADMIN — METOPROLOL TARTRATE 50 MG: 50 TABLET, FILM COATED ORAL at 21:00

## 2024-05-04 RX ADMIN — APIXABAN 5 MG: 5 TABLET, FILM COATED ORAL at 08:15

## 2024-05-04 RX ADMIN — METHYLPREDNISOLONE SODIUM SUCCINATE 40 MG: 40 INJECTION, POWDER, FOR SOLUTION INTRAMUSCULAR; INTRAVENOUS at 22:14

## 2024-05-04 RX ADMIN — OXYMETAZOLINE HYDROCHLORIDE 2 SPRAY: 0.05 SPRAY NASAL at 12:50

## 2024-05-04 RX ADMIN — FLUTICASONE FUROATE AND VILANTEROL TRIFENATATE 1 PUFF: 200; 25 POWDER RESPIRATORY (INHALATION) at 08:17

## 2024-05-04 RX ADMIN — LORAZEPAM 0.5 MG: 0.5 TABLET ORAL at 01:33

## 2024-05-04 RX ADMIN — APIXABAN 5 MG: 5 TABLET, FILM COATED ORAL at 21:00

## 2024-05-04 RX ADMIN — DOXYCYCLINE 100 MG: 100 INJECTION, POWDER, LYOPHILIZED, FOR SOLUTION INTRAVENOUS at 08:16

## 2024-05-04 RX ADMIN — FUROSEMIDE 20 MG: 20 TABLET ORAL at 08:15

## 2024-05-04 RX ADMIN — DOXYCYCLINE 100 MG: 100 INJECTION, POWDER, LYOPHILIZED, FOR SOLUTION INTRAVENOUS at 00:43

## 2024-05-04 RX ADMIN — TIOTROPIUM BROMIDE INHALATION SPRAY 2 PUFF: 3.12 SPRAY, METERED RESPIRATORY (INHALATION) at 08:17

## 2024-05-04 ASSESSMENT — COGNITIVE AND FUNCTIONAL STATUS - GENERAL
CLIMB 3 TO 5 STEPS WITH RAILING: A LITTLE
TURNING FROM BACK TO SIDE WHILE IN FLAT BAD: A LITTLE
DAILY ACTIVITIY SCORE: 20
MOVING TO AND FROM BED TO CHAIR: A LITTLE
MOVING FROM LYING ON BACK TO SITTING ON SIDE OF FLAT BED WITH BEDRAILS: A LITTLE
DRESSING REGULAR UPPER BODY CLOTHING: A LITTLE
HELP NEEDED FOR BATHING: A LITTLE
STANDING UP FROM CHAIR USING ARMS: A LITTLE
WALKING IN HOSPITAL ROOM: A LITTLE
MOBILITY SCORE: 18
TOILETING: A LITTLE
DRESSING REGULAR LOWER BODY CLOTHING: A LITTLE

## 2024-05-04 ASSESSMENT — PAIN SCALES - GENERAL
PAINLEVEL_OUTOF10: 0 - NO PAIN
PAINLEVEL_OUTOF10: 0 - NO PAIN

## 2024-05-04 NOTE — CARE PLAN
The patient's goals for the shift include      The clinical goals for the shift include Patient will report improvent in respiratory symptoms by end of shift.    Over the shift, the patient did not make progress toward the following goals. Barriers to progression include ***. Recommendations to address these barriers include ***.    Problem: Fall/Injury  Goal: Not fall by end of shift  Outcome: Not Progressing  Goal: Be free from injury by end of the shift  Outcome: Not Progressing  Goal: Verbalize understanding of personal risk factors for fall in the hospital  Outcome: Not Progressing  Goal: Verbalize understanding of risk factor reduction measures to prevent injury from fall in the home  Outcome: Not Progressing  Goal: Use assistive devices by end of the shift  Outcome: Not Progressing  Goal: Pace activities to prevent fatigue by end of the shift  Outcome: Not Progressing     Problem: Pain  Goal: My pain/discomfort is manageable  Outcome: Not Progressing     Problem: Safety  Goal: Patient will be injury free during hospitalization  Outcome: Not Progressing  Goal: I will remain free of falls  Outcome: Not Progressing     Problem: Daily Care  Goal: Daily care needs are met  Outcome: Not Progressing     Problem: Psychosocial Needs  Goal: Demonstrates ability to cope with hospitalization/illness  Outcome: Not Progressing  Goal: Collaborate with me, my family, and caregiver to identify my specific goals  Outcome: Not Progressing     Problem: Discharge Barriers  Goal: My discharge needs are met  Outcome: Not Progressing     Problem: Pain - Adult  Goal: Verbalizes/displays adequate comfort level or baseline comfort level  Outcome: Not Progressing     Problem: Safety - Adult  Goal: Free from fall injury  Outcome: Not Progressing     Problem: Discharge Planning  Goal: Discharge to home or other facility with appropriate resources  Outcome: Not Progressing     Problem: Chronic Conditions and Co-morbidities  Goal: Patient's  chronic conditions and co-morbidity symptoms are monitored and maintained or improved  Outcome: Not Progressing

## 2024-05-04 NOTE — NURSING NOTE
Patient activated bed alarm multiple times throughout shift to use restroom, due to urinary urgency related to diuretic medications. Patient instructed to utilize call light so that staff can safely escort her to the restroom. Will continue to round frequently to address toileting needs.

## 2024-05-04 NOTE — PROGRESS NOTES
Abigail Aquino [id number]   Service: Internal Medicine / Hospitalist Date of service: 5/4/2024                                  Full Code         Subjective     History Of Present Illness  Abigail Aquino is a 79 y.o. female with past medical history of O2 dependent COPD/emphysema, coronary artery disease, adenocarcinoma of the left lung, remote pulmonary emboli, hypertension, dyslipidemia presenting with increasing shortness of breath.  Patient states that she has intermittent episodes where she just will desat into the 70s at home with minimal exertion.  She is able to rest and recover.  She states that the last day and a half she has noted wheezing and increasing shortness of breath and less endurance.  She denies having any chest pain with this she denies having any fevers or chills.  Patient does have a cough.  In the emergency room patient was given multiple breathing treatments.  Chest x-ray does not show any acute findings but shows evidence of her untreated adenocarcinoma of the lung.  Patient's condition improved and was being set up for discharge when she got up to get ready to leave she desatted to 75 on her O2 supplementation.  Patient is now on 4 L nasal cannula satting 94%.  When the patient has no functional reserves with desaturation with minimal activity and lives alone with no significant support other than a family across the street will help patient for payment patient is being admitted to the hospital for COPD exacerbation.  She will be placed on IV steroids beta agonist.  We will ask  to look into resources for patient whether it is home health order for placement.     5/3..............The patient voiced her frustration concerning her respiratory status.  Reports that she felt a bit better since admission but admits to recurrence of COPD exacerbation.  No reported : fevers, chills, homicidal ideation, suicidal ideation, chest pains palpitations, nausea or  vomiting.    5/4..................No reported :headaches, chest pains, nausea, vomiting, fevers or chills.Patient still endorsed coughing and congestion.        Review of Systems:   Review of system otherwise negative if not aforementioned above in subjective.     Objective        Physical Exam      Constitutional:       Appearance: Patient appeared in no acute cardiopulmonary distress.     Comments: Patient alert and oriented to person place time and situation.  HEENT:      Head: Normocephalic and atraumatic.Trachea midline      Nose:No observed congestion or rhinorrhea.     Mouth/Throat: Mucous membranes Moist, Trachea appeared  midline.  Eyes:      Extraocular Movements: Extraocular movements intact.      Pupils: Pupils are equal, round, and reactive to light.      Comments: No scleral icterus or conjunctival injection appreciated.   Cardiovascular:      Rate and Rhythm: Normal rate and regular rhythm. No clicks rubs or gallops, normal S1 and S2.No peripheral stigmata of endocarditis appreciated.     Pulmonary:      Scattered wheezes appreciated yesterday, now improved, bases diminished.  Abdominal:      General: Abdomen soft, nontender, active bowel sounds, no involuntary guarding or rebound tenderness appreciated.     Comments: None   Musculoskeletal:       Patient appeared to have full active range of motion for upper and lower extremities, no acute apparent joint deformity appreciated on examination.   No pitting edema or cyanosis appreciated.       Lymphadenopathy:      No appreciable palpable lymphadenopathy  Skin:     General: Skin is warm.      Coloration:  No jaundice     Findings: No abnormal appearing skin rashes or lesions that appeared acute noted on unclothed area of the skin..   Neurological:      General: No focal sensory or motor deficits appreciated, no meningeal signs or dysmetria noted.      Cranial Nerves: Cranial nerves II to XII appearing grossly intact.      Genitals:  Deferred  Psychiatric:         Lab Results   Component Value Date    GLUCOSE 164 (H) 05/04/2024    CALCIUM 9.4 05/04/2024     05/04/2024    K 4.3 05/04/2024    CO2 36 (H) 05/04/2024     05/04/2024    BUN 15 05/04/2024    CREATININE 0.40 (L) 05/04/2024      Lab Results   Component Value Date    WBC 9.0 05/04/2024    HGB 13.5 05/04/2024    HCT 41.0 05/04/2024     05/04/2024     05/04/2024      [unfilled]   [unfilled]   No results found for the last 90 days.                  X-rays/ Images     [unfilled]   Procedure Component Value Units Date/Time   CT angio chest for pulmonary embolism [[id number]] Collected: 05/02/24 2347   Order Status: Completed Updated: 05/02/24 2347   Narrative:     Interpreted By:  Froy Ayon,  STUDY:  CT ANGIO CHEST FOR PULMONARY EMBOLISM;  5/2/2024 11:12 pm      INDICATION:  Signs/Symptoms:Shortness of breath, history of lung cancer in  addition to prior PE.      COMPARISON:  CT scan of the chest 12/28/2023      ACCESSION NUMBER(S):  [id number]      ORDERING CLINICIAN:  DAKSHA SALAS      TECHNIQUE:  Helical data acquisition of the chest was obtained after intravenous  administration of  75 mL of Omnipaque 350. Images were reformatted in  axial, coronal, and sagittal planes. Axial and coronal MIPS were  reconstructed and reviewed.      FINDINGS:  HEART AND VESSELS:  No acute pulmonary embolism to the  segmental arterial level.      Main pulmonary artery and its branches are normal in caliber.      The thoracic aorta is of normal course and caliber  with diffuse  vascular calcifications.      Severe coronary artery calcifications are seen.The study is not  optimized for evaluation of coronary arteries.      The cardiac chambers are not enlarged.      No evidence of pericardial effusion.      MEDIASTINUM AND SHELIA, LOWER NECK AND AXILLA:  The visualized thyroid gland is within normal limits.      No evidence of thoracic lymphadenopathy by CT  criteria.      Esophagus appears within normal limits as seen.      LUNGS AND AIRWAYS:  The trachea and central airways are patent. No endobronchial lesion.      Respiratory motion artifact limits evaluation of the lung parenchyma.  Centrilobular and paraseptal emphysema noted. There is  redemonstration of a lobulated 1.3 x 2.9 cm lesion in the left lower  lobe which is stable from prior CT. Additional 4-5 mm pulmonary  nodules are noted 1 of which in the right lower lobe seen on axial  image 114 of 358 appears to be new. No consolidation, effusion or  pneumothorax. Expiratory phase of imaging.      UPPER ABDOMEN:  Question subtle nodular contour of the liver. Subcentimeter hypodense  focus in the left hepatic lobe is too small to characterize. There is  a 2.3 cm hypodense lesion in the periphery of the right hepatic lobe  demonstrating fluid attenuation suggestive of a cyst. Nodular  thickening of the adrenal glands may relate to hyperplasia or  adenomatous change.      CHEST WALL AND OSSEOUS STRUCTURES:  Generalized diffuse osteopenia. There is moderate compression  deformity of the L1 vertebral body with approximate 50% loss of  vertebral body height and 5 mm retropulsion into the canal. Findings  are stable from prior lumbar spine x-rays 04/11/2024. Multilevel  degenerative changes are present.       Impression:     1. No acute pulmonary embolism to the segmental arterial level.  2. Centrilobular and paraseptal emphysema noted. There is a 1.3 x 2.9  cm lobulated nodule in the left lower lobe consistent with patient's  known history of malignancy. There several additional 4-5 mm nodules  1 of which appears to be new as described above. Attention on  continued follow-up is advised.  3. Question subtle nodularity of the liver contour which can be seen  in the setting of cirrhosis. Correlate with liver function tests.  4. Additional findings as noted above.      MACRO:  None      Signed by: Froy Ayon 5/2/2024  11:46 PM  Dictation workstation:   [id number]   XR chest 1 view [[id number]] Collected: 05/02/24 2032   Order Status: Completed Updated: 05/02/24 2034   Narrative:     Interpreted By:  Rigoberto Terrazas,  STUDY:  XR CHEST 1 VIEW;  5/2/2024 8:06 pm      INDICATION:  Signs/Symptoms:SOB.      COMPARISON:  02/09/2024      ACCESSION NUMBER(S):  [id number]      ORDERING CLINICIAN:  DAKSHA SALAS      FINDINGS:          The cardiomediastinal silhouette and pulmonary vasculature are within  normal limits. There is redemonstration of a solid pulmonary nodule  in the left lower lobe similar to prior study. No new airspace  opacity. No pleural effusion. No consolidation, pleural effusion or  pneumothorax.           Impression:     No acute cardiopulmonary process.      Redemonstration of left lower lobe pulmonary malignancy. Overall no  significant change in size radiographically from 02/09/2024.  Continued oncological follow-up recommended.      MACRO:  None.      Signed by: Rigoberto Terrazas 5/2/2024 8:31 PM  Dictation workstation:   [id number]                  Medical Problems         Problem List         * (Principal) COPD exacerbation (Multi)     Atherosclerotic heart disease of native coronary artery without angina pectoris     Overview Signed 10/4/2023 11:53 AM by Vikki Marquez MD       On IsosorbideER 30 mg daily, Furosemide 20 mg daily, Pravastatin. No seen cardiology in one year. Cardiologist was in [place]. She needs new cardiologist.            Chronic obstructive pulmonary disease, unspecified (Multi)     Overview Signed 12/14/2023 11:57 AM by Marine Levi       Continue home medications.           Difficulty in walking, not elsewhere classified     Mixed hyperlipidemia     Ulcerative colitis, unspecified, without complications (Multi)     Overview Signed 10/4/2023 12:02 PM by Vikki Marquez MD       Diagnosed 2009, has been on Budesonide. Last GI follow up was in 2016. She has not had  follow up. She needs new gastro in local area per patient. Prior was in [place].            Presence of stent in coronary artery     Overview Signed 10/4/2023 11:47 AM by Vikki Marquez MD       2009,            History of pulmonary embolism     Overview Signed 10/4/2023 12:00 PM by Vikki Marquez MD       Reviewed old records. 10/6/2021, had CT contrast that showed no PE but did show nodules. Lost to follow up. In Hospital 8/26, repeat CT with increase in lung mass and no PE. She does have issues with falling and has been on Eliquis since after her fibula fracture in 2021. Do not feel she has indication for long term use and risk outweighs benefit.           Cigarette smoker     Overview Signed 10/4/2023 12:28 PM by Vikki Marquez MD       Started 1956. Has cut back to intermittent only. 2 ppd until recently. 67 times 2 ppd so 124 pack years.  Smoked every day until 5 weeks ago. Now only couple a week. Discussed nicotine replacement. She does not want at this time.            Lung nodules     Lymphocytic colitis     Other acute pulmonary embolism, unspecified whether acute cor pulmonale present (Multi)     Primary cancer of left lower lobe of lung (Multi)     Nicotine dependence     Chronic respiratory failure with hypoxia (Multi)     Age-related physical debility     Long term (current) use of anticoagulants     Hypertensive heart disease with heart failure (Multi)     Pathological fracture due to osteoporosis     Compression fracture of lumbar vertebra (Multi)     Elevated hemoglobin A1c                  Above medical problems may be reflective of historical medical problems that may have resolved and may not related to acute clinical condition/medical problems.     Clinical impression/plan:     Assessment and Plan  #1 COPD exacerbation  Patient with desaturation with minimal exertion.  Patient is to continue with O2 support as necessary.  Patient chronically on 3 L at home currently  requiring 4 L.  Patient will be placed on IV steroids as well as beta agonist.  Patient will be placed on doxycycline empirically.     #2 adenocarcinoma of the lung  Patient was diagnosed with this back in February but has yet to have any treatment for this.  I suspect that patient is delaying any type of treatment.  She states that she wants to get new glasses and hearing aids done before she has any type of chemo.  Patient is having a hard time getting to and from appointments etc. patient is making excuses.  Patient has no chest pain or pleuritic discomfort from the lung malignancy and I think she is just ending and I will and maybe does not want anything to be done at all with that realizing that she does not have any type of quality of life because she did has no exertional capabilities at present time.     #3 remote history of pulmonary emboli  Patient on chronic anticoagulation with Eliquis     #4 coronary artery disease  Not certain when last stress test was performed.  Patient is not having any chest pain with exertion just increasing amount of shortness of breath with minimal amount of exertion.  She is desatting during these events doubt if it is ischemia induced dyspnea.     #5 hypertension  Resume home meds        Disposition/additional care plan/interventions: 5/4/2024         Continue treatment for COPD exacerbation.     Moderate to severe COPD, agree with antibiotic therapy      Continue anticoagulation with Eliquis   Blood pressures elevated this a.m., monitor, monitor iatrogenic contribution from corticosteroids.  Addition of antihypertensive therapy as needed.   With concomitant corticosteroids monitor for fluid retention, additional dose of Lasix 20 mg IV x 1 today.  As needed anxiolytic added yesterday       Patient appeared to have overall poor respiratory reserve.  Anticipate slow recovery from COPD exacerbation will continue close monitoring     The patient was informed of differential  diagnosis , work up , plan of care and possible sequelae of clinical disposition.Patient in agreement with plan of care. Further recommendations forthcoming in accordance with patient's clinical disposition and response to care.     Discharge planning:Discharge timing to be determined.     Care time: >55 mins              Dictation performed with assistance of voice recognition device therefore transcription errors are possible.

## 2024-05-04 NOTE — CARE PLAN
The patient's goals for the shift include      The clinical goals for the shift include Pt spo2 will remain 90% or above overnight on 4L o2 via NC    Over the shift, the patient did not make progress toward the following goals.       Problem: Fall/Injury  Goal: Not fall by end of shift  Outcome: Progressing  Goal: Be free from injury by end of the shift  Outcome: Progressing  Goal: Verbalize understanding of personal risk factors for fall in the hospital  Outcome: Progressing  Goal: Verbalize understanding of risk factor reduction measures to prevent injury from fall in the home  Outcome: Progressing  Goal: Use assistive devices by end of the shift  Outcome: Progressing  Goal: Pace activities to prevent fatigue by end of the shift  Outcome: Progressing     Problem: Pain  Goal: My pain/discomfort is manageable  Outcome: Progressing     Problem: Safety  Goal: Patient will be injury free during hospitalization  Outcome: Progressing  Goal: I will remain free of falls  Outcome: Progressing     Problem: Daily Care  Goal: Daily care needs are met  Outcome: Progressing     Problem: Psychosocial Needs  Goal: Demonstrates ability to cope with hospitalization/illness  Outcome: Progressing  Goal: Collaborate with me, my family, and caregiver to identify my specific goals  Outcome: Progressing     Problem: Discharge Barriers  Goal: My discharge needs are met  Outcome: Progressing     Problem: Pain - Adult  Goal: Verbalizes/displays adequate comfort level or baseline comfort level  Outcome: Progressing     Problem: Safety - Adult  Goal: Free from fall injury  Outcome: Progressing     Problem: Discharge Planning  Goal: Discharge to home or other facility with appropriate resources  Outcome: Progressing     Problem: Chronic Conditions and Co-morbidities  Goal: Patient's chronic conditions and co-morbidity symptoms are monitored and maintained or improved  Outcome: Progressing

## 2024-05-04 NOTE — CARE PLAN
The patient's goals for the shift include      The clinical goals for the shift include Patient will report improvent in respiratory symptoms by end of shift.      Problem: Fall/Injury  Goal: Not fall by end of shift  5/4/2024 1426 by Greg Warren RN  Outcome: Progressing  5/4/2024 1425 by Greg Warren RN  Outcome: Not Progressing  Goal: Be free from injury by end of the shift  5/4/2024 1426 by Greg Warren RN  Outcome: Progressing  5/4/2024 1425 by Greg Warren RN  Outcome: Not Progressing  Goal: Verbalize understanding of personal risk factors for fall in the hospital  5/4/2024 1426 by Greg Warren RN  Outcome: Progressing  5/4/2024 1425 by Greg Warren RN  Outcome: Not Progressing  Goal: Verbalize understanding of risk factor reduction measures to prevent injury from fall in the home  5/4/2024 1426 by Greg Warren RN  Outcome: Progressing  5/4/2024 1425 by Greg Warren RN  Outcome: Not Progressing  Goal: Use assistive devices by end of the shift  5/4/2024 1426 by Greg Warren RN  Outcome: Progressing  5/4/2024 1425 by Greg Warren RN  Outcome: Not Progressing  Goal: Pace activities to prevent fatigue by end of the shift  5/4/2024 1426 by Greg Warren RN  Outcome: Progressing  5/4/2024 1425 by Greg Warren RN  Outcome: Not Progressing     Problem: Pain  Goal: My pain/discomfort is manageable  5/4/2024 1426 by Greg Warren RN  Outcome: Progressing  5/4/2024 1425 by Greg Warren RN  Outcome: Not Progressing     Problem: Safety  Goal: Patient will be injury free during hospitalization  5/4/2024 1426 by Greg Warren RN  Outcome: Progressing  5/4/2024 1425 by Greg Warren RN  Outcome: Not Progressing  Goal: I will remain free of falls  5/4/2024 1426 by Greg Warren RN  Outcome: Progressing  5/4/2024 1425 by Greg Warren RN  Outcome: Not Progressing     Problem: Daily Care  Goal: Daily care needs are met  5/4/2024 1426 by Greg Warren RN  Outcome: Progressing  5/4/2024 1425 by Greg Warren RN  Outcome: Not Progressing      Problem: Psychosocial Needs  Goal: Demonstrates ability to cope with hospitalization/illness  5/4/2024 1426 by Greg Warren RN  Outcome: Progressing  5/4/2024 1425 by Greg Warren RN  Outcome: Not Progressing  Goal: Collaborate with me, my family, and caregiver to identify my specific goals  5/4/2024 1426 by Greg Warren RN  Outcome: Progressing  5/4/2024 1425 by Greg Warren RN  Outcome: Not Progressing     Problem: Discharge Barriers  Goal: My discharge needs are met  5/4/2024 1426 by Greg Warren RN  Outcome: Progressing  5/4/2024 1425 by Greg Warren RN  Outcome: Not Progressing     Problem: Pain - Adult  Goal: Verbalizes/displays adequate comfort level or baseline comfort level  5/4/2024 1426 by Greg Warren RN  Outcome: Progressing  5/4/2024 1425 by Greg Warren RN  Outcome: Not Progressing     Problem: Safety - Adult  Goal: Free from fall injury  5/4/2024 1426 by Greg Warren RN  Outcome: Progressing  5/4/2024 1425 by Greg Warren RN  Outcome: Not Progressing     Problem: Discharge Planning  Goal: Discharge to home or other facility with appropriate resources  5/4/2024 1426 by Greg Warren RN  Outcome: Progressing  5/4/2024 1425 by Greg Warren RN  Outcome: Not Progressing     Problem: Chronic Conditions and Co-morbidities  Goal: Patient's chronic conditions and co-morbidity symptoms are monitored and maintained or improved  5/4/2024 1426 by Greg Warren RN  Outcome: Progressing  5/4/2024 1425 by Greg Warren RN  Outcome: Not Progressing

## 2024-05-05 LAB
ANION GAP SERPL CALC-SCNC: <7 MMOL/L (ref 10–20)
BUN SERPL-MCNC: 16 MG/DL (ref 6–23)
CALCIUM SERPL-MCNC: 8.9 MG/DL (ref 8.6–10.3)
CHLORIDE SERPL-SCNC: 100 MMOL/L (ref 98–107)
CO2 SERPL-SCNC: 38 MMOL/L (ref 21–32)
CREAT SERPL-MCNC: 0.46 MG/DL (ref 0.5–1.05)
EGFRCR SERPLBLD CKD-EPI 2021: >90 ML/MIN/1.73M*2
GLUCOSE SERPL-MCNC: 150 MG/DL (ref 74–99)
MAGNESIUM SERPL-MCNC: 1.87 MG/DL (ref 1.6–2.4)
POTASSIUM SERPL-SCNC: 3.9 MMOL/L (ref 3.5–5.3)
SODIUM SERPL-SCNC: 140 MMOL/L (ref 136–145)

## 2024-05-05 PROCEDURE — 94640 AIRWAY INHALATION TREATMENT: CPT

## 2024-05-05 PROCEDURE — 83735 ASSAY OF MAGNESIUM: CPT | Performed by: HOSPITALIST

## 2024-05-05 PROCEDURE — 2500000005 HC RX 250 GENERAL PHARMACY W/O HCPCS: Performed by: INTERNAL MEDICINE

## 2024-05-05 PROCEDURE — 2500000002 HC RX 250 W HCPCS SELF ADMINISTERED DRUGS (ALT 637 FOR MEDICARE OP, ALT 636 FOR OP/ED): Performed by: HOSPITALIST

## 2024-05-05 PROCEDURE — 80048 BASIC METABOLIC PNL TOTAL CA: CPT | Performed by: HOSPITALIST

## 2024-05-05 PROCEDURE — 2500000001 HC RX 250 WO HCPCS SELF ADMINISTERED DRUGS (ALT 637 FOR MEDICARE OP): Performed by: INTERNAL MEDICINE

## 2024-05-05 PROCEDURE — 2500000004 HC RX 250 GENERAL PHARMACY W/ HCPCS (ALT 636 FOR OP/ED): Performed by: HOSPITALIST

## 2024-05-05 PROCEDURE — 2500000001 HC RX 250 WO HCPCS SELF ADMINISTERED DRUGS (ALT 637 FOR MEDICARE OP): Performed by: HOSPITALIST

## 2024-05-05 PROCEDURE — 36415 COLL VENOUS BLD VENIPUNCTURE: CPT | Performed by: HOSPITALIST

## 2024-05-05 PROCEDURE — 99233 SBSQ HOSP IP/OBS HIGH 50: CPT | Performed by: HOSPITALIST

## 2024-05-05 PROCEDURE — 1200000002 HC GENERAL ROOM WITH TELEMETRY DAILY

## 2024-05-05 PROCEDURE — 2500000004 HC RX 250 GENERAL PHARMACY W/ HCPCS (ALT 636 FOR OP/ED): Performed by: INTERNAL MEDICINE

## 2024-05-05 RX ORDER — GUAIFENESIN 600 MG/1
600 TABLET, EXTENDED RELEASE ORAL 2 TIMES DAILY PRN
Status: DISCONTINUED | OUTPATIENT
Start: 2024-05-05 | End: 2024-05-08

## 2024-05-05 RX ORDER — LORAZEPAM 0.5 MG/1
0.5 TABLET ORAL EVERY 6 HOURS PRN
Status: DISCONTINUED | OUTPATIENT
Start: 2024-05-05 | End: 2024-05-07

## 2024-05-05 RX ADMIN — METHYLPREDNISOLONE SODIUM SUCCINATE 40 MG: 40 INJECTION, POWDER, FOR SOLUTION INTRAMUSCULAR; INTRAVENOUS at 20:52

## 2024-05-05 RX ADMIN — DOXYCYCLINE 100 MG: 100 INJECTION, POWDER, LYOPHILIZED, FOR SOLUTION INTRAVENOUS at 20:53

## 2024-05-05 RX ADMIN — APIXABAN 5 MG: 5 TABLET, FILM COATED ORAL at 20:53

## 2024-05-05 RX ADMIN — Medication 5 L/MIN: at 08:00

## 2024-05-05 RX ADMIN — METHYLPREDNISOLONE SODIUM SUCCINATE 40 MG: 40 INJECTION, POWDER, FOR SOLUTION INTRAMUSCULAR; INTRAVENOUS at 07:51

## 2024-05-05 RX ADMIN — APIXABAN 5 MG: 5 TABLET, FILM COATED ORAL at 07:52

## 2024-05-05 RX ADMIN — IPRATROPIUM BROMIDE AND ALBUTEROL SULFATE 3 ML: 2.5; .5 SOLUTION RESPIRATORY (INHALATION) at 20:02

## 2024-05-05 RX ADMIN — PRAVASTATIN SODIUM 40 MG: 40 TABLET ORAL at 07:51

## 2024-05-05 RX ADMIN — GUAIFENESIN AND DEXTROMETHORPHAN 5 ML: 100; 10 SYRUP ORAL at 07:51

## 2024-05-05 RX ADMIN — FLUTICASONE FUROATE AND VILANTEROL TRIFENATATE 1 PUFF: 200; 25 POWDER RESPIRATORY (INHALATION) at 07:51

## 2024-05-05 RX ADMIN — LORAZEPAM 0.5 MG: 0.5 TABLET ORAL at 07:52

## 2024-05-05 RX ADMIN — IPRATROPIUM BROMIDE AND ALBUTEROL SULFATE 3 ML: 2.5; .5 SOLUTION RESPIRATORY (INHALATION) at 15:19

## 2024-05-05 RX ADMIN — TIOTROPIUM BROMIDE INHALATION SPRAY 2 PUFF: 3.12 SPRAY, METERED RESPIRATORY (INHALATION) at 07:51

## 2024-05-05 RX ADMIN — METOPROLOL TARTRATE 50 MG: 50 TABLET, FILM COATED ORAL at 07:52

## 2024-05-05 RX ADMIN — IPRATROPIUM BROMIDE AND ALBUTEROL SULFATE 3 ML: 2.5; .5 SOLUTION RESPIRATORY (INHALATION) at 11:05

## 2024-05-05 RX ADMIN — METOPROLOL TARTRATE 50 MG: 50 TABLET, FILM COATED ORAL at 20:52

## 2024-05-05 RX ADMIN — DOXYCYCLINE 100 MG: 100 INJECTION, POWDER, LYOPHILIZED, FOR SOLUTION INTRAVENOUS at 07:54

## 2024-05-05 RX ADMIN — ISOSORBIDE MONONITRATE 30 MG: 30 TABLET, EXTENDED RELEASE ORAL at 07:52

## 2024-05-05 RX ADMIN — Medication 5 L/MIN: at 20:02

## 2024-05-05 RX ADMIN — FUROSEMIDE 20 MG: 20 TABLET ORAL at 07:52

## 2024-05-05 RX ADMIN — GUAIFENESIN 600 MG: 600 TABLET ORAL at 16:52

## 2024-05-05 RX ADMIN — LORAZEPAM 0.5 MG: 0.5 TABLET ORAL at 20:52

## 2024-05-05 RX ADMIN — IPRATROPIUM BROMIDE AND ALBUTEROL SULFATE 3 ML: 2.5; .5 SOLUTION RESPIRATORY (INHALATION) at 07:29

## 2024-05-05 ASSESSMENT — COGNITIVE AND FUNCTIONAL STATUS - GENERAL
STANDING UP FROM CHAIR USING ARMS: A LITTLE
TURNING FROM BACK TO SIDE WHILE IN FLAT BAD: A LITTLE
CLIMB 3 TO 5 STEPS WITH RAILING: A LITTLE
HELP NEEDED FOR BATHING: A LITTLE
WALKING IN HOSPITAL ROOM: A LITTLE
DAILY ACTIVITIY SCORE: 22
CLIMB 3 TO 5 STEPS WITH RAILING: A LITTLE
HELP NEEDED FOR BATHING: A LITTLE
MOBILITY SCORE: 22
MOBILITY SCORE: 18
DRESSING REGULAR LOWER BODY CLOTHING: A LITTLE
MOVING FROM LYING ON BACK TO SITTING ON SIDE OF FLAT BED WITH BEDRAILS: A LITTLE
MOVING TO AND FROM BED TO CHAIR: A LITTLE
TOILETING: A LITTLE
DRESSING REGULAR UPPER BODY CLOTHING: A LITTLE
DRESSING REGULAR LOWER BODY CLOTHING: A LITTLE
DAILY ACTIVITIY SCORE: 20
WALKING IN HOSPITAL ROOM: A LITTLE

## 2024-05-05 ASSESSMENT — PAIN SCALES - GENERAL
PAINLEVEL_OUTOF10: 0 - NO PAIN
PAINLEVEL_OUTOF10: 0 - NO PAIN

## 2024-05-05 ASSESSMENT — PAIN - FUNCTIONAL ASSESSMENT: PAIN_FUNCTIONAL_ASSESSMENT: 0-10

## 2024-05-05 NOTE — CARE PLAN
The patient's goals for the shift include  to stop coughing    The clinical goals for the shift include Patient will remain safe this shift      Problem: Fall/Injury  Goal: Not fall by end of shift  Outcome: Progressing  Goal: Be free from injury by end of the shift  Outcome: Progressing  Goal: Verbalize understanding of personal risk factors for fall in the hospital  Outcome: Progressing  Goal: Verbalize understanding of risk factor reduction measures to prevent injury from fall in the home  Outcome: Progressing  Goal: Use assistive devices by end of the shift  Outcome: Progressing  Goal: Pace activities to prevent fatigue by end of the shift  Outcome: Progressing     Problem: Pain  Goal: My pain/discomfort is manageable  Outcome: Progressing     Problem: Safety  Goal: Patient will be injury free during hospitalization  Outcome: Progressing  Goal: I will remain free of falls  Outcome: Progressing     Problem: Daily Care  Goal: Daily care needs are met  Outcome: Progressing     Problem: Psychosocial Needs  Goal: Demonstrates ability to cope with hospitalization/illness  Outcome: Progressing  Goal: Collaborate with me, my family, and caregiver to identify my specific goals  Outcome: Progressing     Problem: Discharge Barriers  Goal: My discharge needs are met  Outcome: Progressing     Problem: Pain - Adult  Goal: Verbalizes/displays adequate comfort level or baseline comfort level  Outcome: Progressing     Problem: Safety - Adult  Goal: Free from fall injury  Outcome: Progressing     Problem: Discharge Planning  Goal: Discharge to home or other facility with appropriate resources  Outcome: Progressing     Problem: Chronic Conditions and Co-morbidities  Goal: Patient's chronic conditions and co-morbidity symptoms are monitored and maintained or improved  Outcome: Progressing

## 2024-05-05 NOTE — CARE PLAN
Problem: Fall/Injury  Goal: Not fall by end of shift  Outcome: Progressing  Goal: Be free from injury by end of the shift  Outcome: Progressing  Goal: Verbalize understanding of personal risk factors for fall in the hospital  Outcome: Progressing  Goal: Verbalize understanding of risk factor reduction measures to prevent injury from fall in the home  Outcome: Progressing  Goal: Use assistive devices by end of the shift  Outcome: Progressing  Goal: Pace activities to prevent fatigue by end of the shift  Outcome: Progressing     Problem: Pain  Goal: My pain/discomfort is manageable  Outcome: Progressing     Problem: Safety  Goal: Patient will be injury free during hospitalization  Outcome: Progressing  Goal: I will remain free of falls  Outcome: Progressing     Problem: Daily Care  Goal: Daily care needs are met  Outcome: Progressing     Problem: Psychosocial Needs  Goal: Demonstrates ability to cope with hospitalization/illness  Outcome: Progressing  Goal: Collaborate with me, my family, and caregiver to identify my specific goals  Outcome: Progressing     Problem: Discharge Barriers  Goal: My discharge needs are met  Outcome: Progressing     Problem: Pain - Adult  Goal: Verbalizes/displays adequate comfort level or baseline comfort level  Outcome: Progressing     Problem: Safety - Adult  Goal: Free from fall injury  Outcome: Progressing     Problem: Discharge Planning  Goal: Discharge to home or other facility with appropriate resources  Outcome: Progressing     Problem: Chronic Conditions and Co-morbidities  Goal: Patient's chronic conditions and co-morbidity symptoms are monitored and maintained or improved  Outcome: Progressing

## 2024-05-05 NOTE — PROGRESS NOTES
Abigail Aquino [id number]   Service: Internal Medicine / Hospitalist Date of service: 5/5/2024                                  Full Code         Subjective     History Of Present Illness  Abigail Aquino is a 79 y.o. female with past medical history of O2 dependent COPD/emphysema, coronary artery disease, adenocarcinoma of the left lung, remote pulmonary emboli, hypertension, dyslipidemia presenting with increasing shortness of breath.  Patient states that she has intermittent episodes where she just will desat into the 70s at home with minimal exertion.  She is able to rest and recover.  She states that the last day and a half she has noted wheezing and increasing shortness of breath and less endurance.  She denies having any chest pain with this she denies having any fevers or chills.  Patient does have a cough.  In the emergency room patient was given multiple breathing treatments.  Chest x-ray does not show any acute findings but shows evidence of her untreated adenocarcinoma of the lung.  Patient's condition improved and was being set up for discharge when she got up to get ready to leave she desatted to 75 on her O2 supplementation.  Patient is now on 4 L nasal cannula satting 94%.  When the patient has no functional reserves with desaturation with minimal activity and lives alone with no significant support other than a family across the street will help patient for payment patient is being admitted to the hospital for COPD exacerbation.  She will be placed on IV steroids beta agonist.  We will ask  to look into resources for patient whether it is home health order for placement.     5/3..............The patient voiced her frustration concerning her respiratory status.  Reports that she felt a bit better since admission but admits to recurrence of COPD exacerbation.  No reported : fevers, chills, homicidal ideation, suicidal ideation, chest pains palpitations, nausea or vomiting.      5/4..................No reported :headaches, chest pains, nausea, vomiting, fevers or chills.Patient still endorsed coughing and congestion.    5/5..................Patient requesting a mucolytic no reported headaches, chest pains, fevers or chills.  Patient tearful concerning her chronic respiratory disposition O2 dependency.  She requested that she will likely need help at home.  Social service consultation was placed.  No reported: Hallucinations, homicidal ideation, suicidal ideation.        Review of Systems:   Review of system otherwise negative if not aforementioned above in subjective.     Objective        Physical Exam      Constitutional:       Appearance: Patient appeared in no acute cardiopulmonary distress.     Comments: Patient alert and oriented to person place time and situation.  HEENT:      Head: Normocephalic and atraumatic.Trachea midline      Nose:No observed congestion or rhinorrhea.     Mouth/Throat: Mucous membranes Moist, Trachea appeared  midline.  Eyes:      Extraocular Movements: Extraocular movements intact.      Pupils: Pupils are equal, round, and reactive to light.      Comments: No scleral icterus or conjunctival injection appreciated.   Cardiovascular:      Rate and Rhythm: Normal rate and regular rhythm. No clicks rubs or gallops, normal S1 and S2.No peripheral stigmata of endocarditis appreciated.     Pulmonary:      Scattered wheezes appreciated .  Abdominal:      General: Abdomen soft, nontender, active bowel sounds, no involuntary guarding or rebound tenderness appreciated.     Comments: None   Musculoskeletal:       Patient appeared to have full active range of motion for upper and lower extremities, no acute apparent joint deformity appreciated on examination.   No pitting edema or cyanosis appreciated.       Lymphadenopathy:      No appreciable palpable lymphadenopathy  Skin:     General: Skin is warm.      Coloration:  No jaundice     Findings: No abnormal appearing skin  rashes or lesions that appeared acute noted on unclothed area of the skin..   Neurological:      General: No focal sensory or motor deficits appreciated, no meningeal signs or dysmetria noted.      Cranial Nerves: Cranial nerves II to XII appearing grossly intact.     Genitals:  Deferred  Psychiatric:               Lab Results   Component Value Date     GLUCOSE 164 (H) 05/04/2024     CALCIUM 9.4 05/04/2024      05/04/2024     K 4.3 05/04/2024     CO2 36 (H) 05/04/2024      05/04/2024     BUN 15 05/04/2024     CREATININE 0.40 (L) 05/04/2024            Lab Results   Component Value Date     WBC 9.0 05/04/2024     HGB 13.5 05/04/2024     HCT 41.0 05/04/2024      05/04/2024      05/04/2024      [unfilled]   [unfilled]   No results found for the last 90 days.                  X-rays/ Images     [unfilled]   Procedure Component Value Units Date/Time   CT angio chest for pulmonary embolism [[id number]] Collected: 05/02/24 2347   Order Status: Completed Updated: 05/02/24 2347   Narrative:     Interpreted By:  Froy Ayon,  STUDY:  CT ANGIO CHEST FOR PULMONARY EMBOLISM;  5/2/2024 11:12 pm      INDICATION:  Signs/Symptoms:Shortness of breath, history of lung cancer in  addition to prior PE.      COMPARISON:  CT scan of the chest 12/28/2023      ACCESSION NUMBER(S):  [id number]      ORDERING CLINICIAN:  DAKSHA SALAS      TECHNIQUE:  Helical data acquisition of the chest was obtained after intravenous  administration of  75 mL of Omnipaque 350. Images were reformatted in  axial, coronal, and sagittal planes. Axial and coronal MIPS were  reconstructed and reviewed.      FINDINGS:  HEART AND VESSELS:  No acute pulmonary embolism to the  segmental arterial level.      Main pulmonary artery and its branches are normal in caliber.      The thoracic aorta is of normal course and caliber  with diffuse  vascular calcifications.      Severe coronary artery calcifications are seen.The study is  not  optimized for evaluation of coronary arteries.      The cardiac chambers are not enlarged.      No evidence of pericardial effusion.      MEDIASTINUM AND SHELIA, LOWER NECK AND AXILLA:  The visualized thyroid gland is within normal limits.      No evidence of thoracic lymphadenopathy by CT criteria.      Esophagus appears within normal limits as seen.      LUNGS AND AIRWAYS:  The trachea and central airways are patent. No endobronchial lesion.      Respiratory motion artifact limits evaluation of the lung parenchyma.  Centrilobular and paraseptal emphysema noted. There is  redemonstration of a lobulated 1.3 x 2.9 cm lesion in the left lower  lobe which is stable from prior CT. Additional 4-5 mm pulmonary  nodules are noted 1 of which in the right lower lobe seen on axial  image 114 of 358 appears to be new. No consolidation, effusion or  pneumothorax. Expiratory phase of imaging.      UPPER ABDOMEN:  Question subtle nodular contour of the liver. Subcentimeter hypodense  focus in the left hepatic lobe is too small to characterize. There is  a 2.3 cm hypodense lesion in the periphery of the right hepatic lobe  demonstrating fluid attenuation suggestive of a cyst. Nodular  thickening of the adrenal glands may relate to hyperplasia or  adenomatous change.      CHEST WALL AND OSSEOUS STRUCTURES:  Generalized diffuse osteopenia. There is moderate compression  deformity of the L1 vertebral body with approximate 50% loss of  vertebral body height and 5 mm retropulsion into the canal. Findings  are stable from prior lumbar spine x-rays 04/11/2024. Multilevel  degenerative changes are present.       Impression:     1. No acute pulmonary embolism to the segmental arterial level.  2. Centrilobular and paraseptal emphysema noted. There is a 1.3 x 2.9  cm lobulated nodule in the left lower lobe consistent with patient's  known history of malignancy. There several additional 4-5 mm nodules  1 of which appears to be new as  described above. Attention on  continued follow-up is advised.  3. Question subtle nodularity of the liver contour which can be seen  in the setting of cirrhosis. Correlate with liver function tests.  4. Additional findings as noted above.      MACRO:  None      Signed by: Froy Ayon 5/2/2024 11:46 PM  Dictation workstation:   [id number]   XR chest 1 view [[id number]] Collected: 05/02/24 2032   Order Status: Completed Updated: 05/02/24 2034   Narrative:     Interpreted By:  Rigoberto Terrazas,  STUDY:  XR CHEST 1 VIEW;  5/2/2024 8:06 pm      INDICATION:  Signs/Symptoms:SOB.      COMPARISON:  02/09/2024      ACCESSION NUMBER(S):  [id number]      ORDERING CLINICIAN:  DAKSHA SALAS      FINDINGS:          The cardiomediastinal silhouette and pulmonary vasculature are within  normal limits. There is redemonstration of a solid pulmonary nodule  in the left lower lobe similar to prior study. No new airspace  opacity. No pleural effusion. No consolidation, pleural effusion or  pneumothorax.           Impression:     No acute cardiopulmonary process.      Redemonstration of left lower lobe pulmonary malignancy. Overall no  significant change in size radiographically from 02/09/2024.  Continued oncological follow-up recommended.      MACRO:  None.      Signed by: Rigoberto Terrazas 5/2/2024 8:31 PM  Dictation workstation:   [id number]                  Medical Problems         Problem List         * (Principal) COPD exacerbation (Multi)     Atherosclerotic heart disease of native coronary artery without angina pectoris     Overview Signed 10/4/2023 11:53 AM by Vikki Marquez MD       On IsosorbideER 30 mg daily, Furosemide 20 mg daily, Pravastatin. No seen cardiology in one year. Cardiologist was in [place]. She needs new cardiologist.            Chronic obstructive pulmonary disease, unspecified (Multi)     Overview Signed 12/14/2023 11:57 AM by Marine Levi       Continue home medications.            Difficulty in walking, not elsewhere classified     Mixed hyperlipidemia     Ulcerative colitis, unspecified, without complications (Multi)     Overview Signed 10/4/2023 12:02 PM by Vikki Marquez MD       Diagnosed 2009, has been on Budesonide. Last GI follow up was in 2016. She has not had follow up. She needs new gastro in local area per patient. Prior was in [place].            Presence of stent in coronary artery     Overview Signed 10/4/2023 11:47 AM by Vikki Marquez MD       2009,            History of pulmonary embolism     Overview Signed 10/4/2023 12:00 PM by Vikki Marquez MD       Reviewed old records. 10/6/2021, had CT contrast that showed no PE but did show nodules. Lost to follow up. In Hospital 8/26, repeat CT with increase in lung mass and no PE. She does have issues with falling and has been on Eliquis since after her fibula fracture in 2021. Do not feel she has indication for long term use and risk outweighs benefit.           Cigarette smoker     Overview Signed 10/4/2023 12:28 PM by Vikki Marquez MD       Started 1956. Has cut back to intermittent only. 2 ppd until recently. 67 times 2 ppd so 124 pack years.  Smoked every day until 5 weeks ago. Now only couple a week. Discussed nicotine replacement. She does not want at this time.            Lung nodules     Lymphocytic colitis     Other acute pulmonary embolism, unspecified whether acute cor pulmonale present (Multi)     Primary cancer of left lower lobe of lung (Multi)     Nicotine dependence     Chronic respiratory failure with hypoxia (Multi)     Age-related physical debility     Long term (current) use of anticoagulants     Hypertensive heart disease with heart failure (Multi)     Pathological fracture due to osteoporosis     Compression fracture of lumbar vertebra (Multi)     Elevated hemoglobin A1c                  Above medical problems may be reflective of historical medical problems that may have  resolved and may not related to acute clinical condition/medical problems.     Clinical impression/plan:     Assessment and Plan  #1 COPD exacerbation  Patient with desaturation with minimal exertion.  Patient is to continue with O2 support as necessary.  Patient chronically on 3 L at home currently requiring 4 L.  Patient will be placed on IV steroids as well as beta agonist.  Patient will be placed on doxycycline empirically.     #2 adenocarcinoma of the lung  Patient was diagnosed with this back in February but has yet to have any treatment for this.  I suspect that patient is delaying any type of treatment.  She states that she wants to get new glasses and hearing aids done before she has any type of chemo.  Patient is having a hard time getting to and from appointments etc. patient is making excuses.  Patient has no chest pain or pleuritic discomfort from the lung malignancy and I think she is just ending and I will and maybe does not want anything to be done at all with that realizing that she does not have any type of quality of life because she did has no exertional capabilities at present time.     #3 remote history of pulmonary emboli  Patient on chronic anticoagulation with Eliquis     #4 coronary artery disease  Not certain when last stress test was performed.  Patient is not having any chest pain with exertion just increasing amount of shortness of breath with minimal amount of exertion.  She is desatting during these events doubt if it is ischemia induced dyspnea.     #5 hypertension  Resume home meds          Disposition/additional care plan/interventions: 5/5/2024       Scattered wheezes appreciated today but isolated and less coarse in nature.    Worsening blood glucose levels secondary to  needed corticosteroids will continue to monitor.  Low-carb diet.  Respiratory disposition appears to be showing interval improvement for acute exacerbation of.    Consult to psychiatric service concerning  possible underlying depression.    Mucinex will be added to patient's treatment plan    Anticipate weaning corticosteroids in next 24 hours.  Patient appeared to have overall poor respiratory reserve.  Anticipate slow recovery from COPD exacerbation will continue close monitoring     The patient was informed of differential diagnosis , work up , plan of care and possible sequelae of clinical disposition.Patient in agreement with plan of care. Further recommendations forthcoming in accordance with patient's clinical disposition and response to care.     Discharge planning:Discharge timing to be determined.     Care time: >55 mins              Dictation performed with assistance of voice recognition device therefore transcription errors are possible.

## 2024-05-06 ENCOUNTER — TELEMEDICINE (OUTPATIENT)
Dept: PHARMACY | Facility: HOSPITAL | Age: 79
End: 2024-05-06
Payer: COMMERCIAL

## 2024-05-06 ENCOUNTER — APPOINTMENT (OUTPATIENT)
Dept: CARDIOLOGY | Facility: CLINIC | Age: 79
End: 2024-05-06
Payer: COMMERCIAL

## 2024-05-06 DIAGNOSIS — J44.9 CHRONIC OBSTRUCTIVE PULMONARY DISEASE, UNSPECIFIED COPD TYPE (MULTI): Primary | ICD-10-CM

## 2024-05-06 LAB
ANION GAP SERPL CALC-SCNC: <7 MMOL/L (ref 10–20)
BACTERIA SPEC RESP CULT: ABNORMAL
BUN SERPL-MCNC: 17 MG/DL (ref 6–23)
CALCIUM SERPL-MCNC: 9.3 MG/DL (ref 8.6–10.3)
CHLORIDE SERPL-SCNC: 98 MMOL/L (ref 98–107)
CO2 SERPL-SCNC: 43 MMOL/L (ref 21–32)
CREAT SERPL-MCNC: 0.46 MG/DL (ref 0.5–1.05)
EGFRCR SERPLBLD CKD-EPI 2021: >90 ML/MIN/1.73M*2
GLUCOSE SERPL-MCNC: 145 MG/DL (ref 74–99)
GRAM STN SPEC: ABNORMAL
POTASSIUM SERPL-SCNC: 3.9 MMOL/L (ref 3.5–5.3)
SODIUM SERPL-SCNC: 142 MMOL/L (ref 136–145)

## 2024-05-06 PROCEDURE — 2500000001 HC RX 250 WO HCPCS SELF ADMINISTERED DRUGS (ALT 637 FOR MEDICARE OP): Performed by: INTERNAL MEDICINE

## 2024-05-06 PROCEDURE — 36415 COLL VENOUS BLD VENIPUNCTURE: CPT | Performed by: HOSPITALIST

## 2024-05-06 PROCEDURE — 2500000004 HC RX 250 GENERAL PHARMACY W/ HCPCS (ALT 636 FOR OP/ED): Performed by: INTERNAL MEDICINE

## 2024-05-06 PROCEDURE — 1200000002 HC GENERAL ROOM WITH TELEMETRY DAILY

## 2024-05-06 PROCEDURE — 87205 SMEAR GRAM STAIN: CPT | Mod: PORLAB | Performed by: HOSPITALIST

## 2024-05-06 PROCEDURE — 99233 SBSQ HOSP IP/OBS HIGH 50: CPT | Performed by: HOSPITALIST

## 2024-05-06 PROCEDURE — 80051 ELECTROLYTE PANEL: CPT | Performed by: HOSPITALIST

## 2024-05-06 PROCEDURE — 2500000004 HC RX 250 GENERAL PHARMACY W/ HCPCS (ALT 636 FOR OP/ED): Performed by: HOSPITALIST

## 2024-05-06 PROCEDURE — 2500000001 HC RX 250 WO HCPCS SELF ADMINISTERED DRUGS (ALT 637 FOR MEDICARE OP): Performed by: HOSPITALIST

## 2024-05-06 PROCEDURE — 2500000005 HC RX 250 GENERAL PHARMACY W/O HCPCS: Performed by: INTERNAL MEDICINE

## 2024-05-06 RX ORDER — ALBUTEROL SULFATE 90 UG/1
2 AEROSOL, METERED RESPIRATORY (INHALATION) EVERY 4 HOURS PRN
Status: DISCONTINUED | OUTPATIENT
Start: 2024-05-06 | End: 2024-05-09 | Stop reason: HOSPADM

## 2024-05-06 RX ADMIN — METOPROLOL TARTRATE 50 MG: 50 TABLET, FILM COATED ORAL at 21:06

## 2024-05-06 RX ADMIN — ISOSORBIDE MONONITRATE 30 MG: 30 TABLET, EXTENDED RELEASE ORAL at 08:12

## 2024-05-06 RX ADMIN — DOXYCYCLINE 100 MG: 100 INJECTION, POWDER, LYOPHILIZED, FOR SOLUTION INTRAVENOUS at 08:14

## 2024-05-06 RX ADMIN — ALBUTEROL SULFATE 2 PUFF: 90 AEROSOL, METERED RESPIRATORY (INHALATION) at 17:28

## 2024-05-06 RX ADMIN — FLUTICASONE FUROATE AND VILANTEROL TRIFENATATE 1 PUFF: 200; 25 POWDER RESPIRATORY (INHALATION) at 08:07

## 2024-05-06 RX ADMIN — METHYLPREDNISOLONE SODIUM SUCCINATE 40 MG: 40 INJECTION, POWDER, FOR SOLUTION INTRAMUSCULAR; INTRAVENOUS at 08:13

## 2024-05-06 RX ADMIN — DOXYCYCLINE 100 MG: 100 INJECTION, POWDER, LYOPHILIZED, FOR SOLUTION INTRAVENOUS at 21:07

## 2024-05-06 RX ADMIN — Medication 4 L/MIN: at 08:00

## 2024-05-06 RX ADMIN — PRAVASTATIN SODIUM 40 MG: 40 TABLET ORAL at 08:12

## 2024-05-06 RX ADMIN — LORAZEPAM 0.5 MG: 0.5 TABLET ORAL at 08:12

## 2024-05-06 RX ADMIN — METOPROLOL TARTRATE 50 MG: 50 TABLET, FILM COATED ORAL at 08:12

## 2024-05-06 RX ADMIN — Medication 4 L/MIN: at 20:00

## 2024-05-06 RX ADMIN — LORAZEPAM 0.5 MG: 0.5 TABLET ORAL at 17:48

## 2024-05-06 RX ADMIN — FUROSEMIDE 20 MG: 20 TABLET ORAL at 08:12

## 2024-05-06 RX ADMIN — APIXABAN 5 MG: 5 TABLET, FILM COATED ORAL at 21:06

## 2024-05-06 RX ADMIN — APIXABAN 5 MG: 5 TABLET, FILM COATED ORAL at 08:12

## 2024-05-06 RX ADMIN — METHYLPREDNISOLONE SODIUM SUCCINATE 20 MG: 40 INJECTION, POWDER, FOR SOLUTION INTRAMUSCULAR; INTRAVENOUS at 21:07

## 2024-05-06 RX ADMIN — TIOTROPIUM BROMIDE INHALATION SPRAY 2 PUFF: 3.12 SPRAY, METERED RESPIRATORY (INHALATION) at 08:07

## 2024-05-06 RX ADMIN — ALBUTEROL SULFATE 2 PUFF: 90 AEROSOL, METERED RESPIRATORY (INHALATION) at 03:32

## 2024-05-06 RX ADMIN — LORAZEPAM 0.5 MG: 0.5 TABLET ORAL at 23:53

## 2024-05-06 RX ADMIN — GUAIFENESIN 600 MG: 600 TABLET ORAL at 08:12

## 2024-05-06 RX ADMIN — Medication 5 L/MIN: at 05:07

## 2024-05-06 RX ADMIN — ALBUTEROL SULFATE 2 PUFF: 90 AEROSOL, METERED RESPIRATORY (INHALATION) at 21:09

## 2024-05-06 ASSESSMENT — COGNITIVE AND FUNCTIONAL STATUS - GENERAL
TOILETING: A LITTLE
MOVING TO AND FROM BED TO CHAIR: A LITTLE
WALKING IN HOSPITAL ROOM: A LITTLE
MOVING FROM LYING ON BACK TO SITTING ON SIDE OF FLAT BED WITH BEDRAILS: A LITTLE
CLIMB 3 TO 5 STEPS WITH RAILING: A LITTLE
DRESSING REGULAR LOWER BODY CLOTHING: A LITTLE
DRESSING REGULAR LOWER BODY CLOTHING: A LITTLE
WALKING IN HOSPITAL ROOM: A LITTLE
MOVING TO AND FROM BED TO CHAIR: A LITTLE
DRESSING REGULAR UPPER BODY CLOTHING: A LITTLE
TURNING FROM BACK TO SIDE WHILE IN FLAT BAD: A LITTLE
HELP NEEDED FOR BATHING: A LITTLE
HELP NEEDED FOR BATHING: A LITTLE
STANDING UP FROM CHAIR USING ARMS: A LITTLE
STANDING UP FROM CHAIR USING ARMS: A LITTLE
DAILY ACTIVITIY SCORE: 21
MOBILITY SCORE: 19
TOILETING: A LITTLE
DAILY ACTIVITIY SCORE: 20
MOBILITY SCORE: 18
CLIMB 3 TO 5 STEPS WITH RAILING: A LOT

## 2024-05-06 ASSESSMENT — PAIN SCALES - GENERAL
PAINLEVEL_OUTOF10: 0 - NO PAIN
PAINLEVEL_OUTOF10: 0 - NO PAIN

## 2024-05-06 ASSESSMENT — PAIN - FUNCTIONAL ASSESSMENT: PAIN_FUNCTIONAL_ASSESSMENT: 0-10

## 2024-05-06 ASSESSMENT — ACTIVITIES OF DAILY LIVING (ADL): LACK_OF_TRANSPORTATION: NO

## 2024-05-06 NOTE — CARE PLAN
The patient's goals for the shift include      The clinical goals for the shift include pt will remain free of falls and injury overnight      Problem: Fall/Injury  Goal: Not fall by end of shift  Outcome: Progressing  Goal: Be free from injury by end of the shift  Outcome: Progressing  Goal: Verbalize understanding of personal risk factors for fall in the hospital  Outcome: Progressing  Goal: Verbalize understanding of risk factor reduction measures to prevent injury from fall in the home  Outcome: Progressing  Goal: Use assistive devices by end of the shift  Outcome: Progressing  Goal: Pace activities to prevent fatigue by end of the shift  Outcome: Progressing     Problem: Pain  Goal: My pain/discomfort is manageable  Outcome: Progressing     Problem: Safety  Goal: Patient will be injury free during hospitalization  Outcome: Progressing  Goal: I will remain free of falls  Outcome: Progressing     Problem: Daily Care  Goal: Daily care needs are met  Outcome: Progressing     Problem: Psychosocial Needs  Goal: Demonstrates ability to cope with hospitalization/illness  Outcome: Progressing  Goal: Collaborate with me, my family, and caregiver to identify my specific goals  Outcome: Progressing     Problem: Discharge Barriers  Goal: My discharge needs are met  Outcome: Progressing     Problem: Pain - Adult  Goal: Verbalizes/displays adequate comfort level or baseline comfort level  Outcome: Progressing     Problem: Safety - Adult  Goal: Free from fall injury  Outcome: Progressing     Problem: Discharge Planning  Goal: Discharge to home or other facility with appropriate resources  Outcome: Progressing     Problem: Chronic Conditions and Co-morbidities  Goal: Patient's chronic conditions and co-morbidity symptoms are monitored and maintained or improved  Outcome: Progressing

## 2024-05-06 NOTE — PROGRESS NOTES
05/06/24 1254   Discharge Planning   Living Arrangements Alone   Support Systems Friends/neighbors;Family members   Assistance Needed yes with household chores   Type of Residence Private residence   Number of Stairs to Enter Residence 4   Number of Stairs Within Residence 0   Do you have animals or pets at home? No   Who is requesting discharge planning? Provider   Home or Post Acute Services None   Patient expects to be discharged to: Home   Does the patient need discharge transport arranged? No   Financial Resource Strain   How hard is it for you to pay for the very basics like food, housing, medical care, and heating? Not hard   Housing Stability   In the last 12 months, was there a time when you were not able to pay the mortgage or rent on time? N   In the last 12 months, how many places have you lived? 1   Transportation Needs   In the past 12 months, has lack of transportation kept you from medical appointments or from getting medications? no   In the past 12 months, has lack of transportation kept you from meetings, work, or from getting things needed for daily living? No     Spoke with pt via phone due to working remote (difficult even with hearing aides in she is VERY [place]) and verified address, phone number and emergency contact information. PCP is  Dr Marquez last seen in April. Preferred pharmacy is [facility], she denies issues obtaining or affording her home medications. Pt is independent, lives alone  neighbors assist her and feels safe.  She could use private duty assistance as she stated I need help carrying laundry hanging laundry and washing windows. I explained her medical insurance would not cover this. Pt wears oxygen at home 3 L nc all the time and it is through [facility]. Her plan is to return home at discharge, Psych eval for depression. [facility] 22/22. ADOD 5/8. Plan is to return home. CT to follow. Lyndsay Hardwick BSN/RN-[facility]

## 2024-05-06 NOTE — DOCUMENTATION CLARIFICATION NOTE
"    PATIENT:               NILA CORTÉS  ACCT #:                  [id number]  MRN:                       [id number]  :                       1945  ADMIT DATE:       2024 7:07 PM  DISCH DATE:  RESPONDING PROVIDER #:        [id number]          PROVIDER RESPONSE TEXT:    Acute Hypoxemic Respiratory Failure    CDI QUERY TEXT:    Clarification    Instruction:    Based on your assessment of the patient and the clinical information, please provide the requested documentation by clicking on the appropriate radio button and enter any additional information if prompted.    Question: Is there a diagnosis indicative of the clinical information    When answering this query, please exercise your independent professional judgment. The fact that a question is being asked, does not imply that any particular answer is desired or expected.    The patient's clinical indicators include:  Clinical Information: Patient admitted with COPD exacerbation    Clinical Indicators: Per H and P, \"Patient's condition improved and was being set up for discharge when she got up to get ready to leave she desatted to 75 on her O2 supplementation.  Patient is now on 4 L nasal cannula satting 94 percent.....COPD exacerbation- Patient with desaturation with minimal exertion.  Patient is to continue with O2 support as necessary.  Patient chronically on 3 L at home currently requiring 4 L. \"     VBG: pH 7.32, pCO2 78, pO2 28, SO2 42  : pulse 84-91, resps 21-29, satting 75 percent on supplemental O2 (liters not specified)- placed on 8LNC satting 92 percent    Treatment: supplemental O2 noted to be an increase from baseline, duonebs, solumedrol, monitoring sats/vitals    Risk Factors: 79yof admitted with COPD exacerbation  Options provided:  -- Acute on Chronic Hypoxemic Respiratory Failure  -- Acute on Chronic Hypercapnic Respiratory Failure  -- Acute on Chronic Hypoxemic and Hypercapnic Respiratory Failure  -- Other - I will add my own " diagnosis  -- Refer to Clinical Documentation Reviewer    Query created by: Katina Paula on 5/6/2024 10:09 AM      Electronically signed by:  DARION TERRAZAS DO 5/6/2024 6:11 PM

## 2024-05-06 NOTE — CARE PLAN
The patient's goals for the shift include  to stop coughing.    The clinical goals for the shift include patient will remain free of falls or injury throughout this shift.       Problem: Fall/Injury  Goal: Not fall by end of shift  Outcome: Progressing  Goal: Be free from injury by end of the shift  Outcome: Progressing  Goal: Verbalize understanding of personal risk factors for fall in the hospital  Outcome: Progressing  Goal: Verbalize understanding of risk factor reduction measures to prevent injury from fall in the home  Outcome: Progressing  Goal: Use assistive devices by end of the shift  Outcome: Progressing  Goal: Pace activities to prevent fatigue by end of the shift  Outcome: Progressing     Problem: Pain  Goal: My pain/discomfort is manageable  Outcome: Progressing     Problem: Safety  Goal: Patient will be injury free during hospitalization  Outcome: Progressing  Goal: I will remain free of falls  Outcome: Progressing     Problem: Daily Care  Goal: Daily care needs are met  Outcome: Progressing     Problem: Psychosocial Needs  Goal: Demonstrates ability to cope with hospitalization/illness  Outcome: Progressing  Goal: Collaborate with me, my family, and caregiver to identify my specific goals  Outcome: Progressing     Problem: Discharge Barriers  Goal: My discharge needs are met  Outcome: Progressing     Problem: Pain - Adult  Goal: Verbalizes/displays adequate comfort level or baseline comfort level  Outcome: Progressing     Problem: Safety - Adult  Goal: Free from fall injury  Outcome: Progressing     Problem: Discharge Planning  Goal: Discharge to home or other facility with appropriate resources  Outcome: Progressing     Problem: Chronic Conditions and Co-morbidities  Goal: Patient's chronic conditions and co-morbidity symptoms are monitored and maintained or improved  Outcome: Progressing

## 2024-05-06 NOTE — PROGRESS NOTES
Social work consult placed for discharge planning. SW reviewed pt's chart and communicated with [facility]. No SW needs foreseen at this time. SW signing off; available upon request.    ELIAS Bolaños ([id number])   Care Transitions

## 2024-05-06 NOTE — PROGRESS NOTES
Abigail Aquino [id number]   Service: Internal Medicine / Hospitalist Date of service: 5/6/2024                                  Full Code         Subjective     History Of Present Illness  Abigail Aquino is a 79 y.o. female with past medical history of O2 dependent COPD/emphysema, coronary artery disease, adenocarcinoma of the left lung, remote pulmonary emboli, hypertension, dyslipidemia presenting with increasing shortness of breath.  Patient states that she has intermittent episodes where she just will desat into the 70s at home with minimal exertion.  She is able to rest and recover.  She states that the last day and a half she has noted wheezing and increasing shortness of breath and less endurance.  She denies having any chest pain with this she denies having any fevers or chills.  Patient does have a cough.  In the emergency room patient was given multiple breathing treatments.  Chest x-ray does not show any acute findings but shows evidence of her untreated adenocarcinoma of the lung.  Patient's condition improved and was being set up for discharge when she got up to get ready to leave she desatted to 75 on her O2 supplementation.  Patient is now on 4 L nasal cannula satting 94%.  When the patient has no functional reserves with desaturation with minimal activity and lives alone with no significant support other than a family across the street will help patient for payment patient is being admitted to the hospital for COPD exacerbation.  She will be placed on IV steroids beta agonist.  We will ask  to look into resources for patient whether it is home health order for placement.     5/3..............The patient voiced her frustration concerning her respiratory status.  Reports that she felt a bit better since admission but admits to recurrence of COPD exacerbation.  No reported : fevers, chills, homicidal ideation, suicidal ideation, chest pains palpitations, nausea or  vomiting.     5/4..................No reported :headaches, chest pains, nausea, vomiting, fevers or chills.Patient still endorsed coughing and congestion.     5/5..................Patient requesting a mucolytic no reported headaches, chest pains, fevers or chills.  Patient tearful concerning her chronic respiratory disposition O2 dependency.  She requested that she will likely need help at home.  Social service consultation was placed.  No reported: Hallucinations, homicidal ideation, suicidal ideation.    5/6.....................Patient very tearful this morning at the time of evaluation felt that her overall illness is overwhelming.  No reported suicidal or homicidal ideation voiced by patient.  However patient did perform feeling depressed.  No reported: Headaches, diplopia, syncope, presyncope, nausea, vomiting, fevers or chills..        Review of Systems:   Review of system otherwise negative if not aforementioned above in subjective.     Objective        Physical Exam      Constitutional:       Appearance: Patient appeared in no acute cardiopulmonary distress.     Comments: Patient alert and oriented to person place time and situation.Patient able to speak in full sentences no difficulties.  HEENT:      Head: Normocephalic and atraumatic.Trachea midline      Nose:No observed congestion or rhinorrhea.     Mouth/Throat: Mucous membranes Moist, Trachea appeared  midline.  Eyes:      Extraocular Movements: Extraocular movements intact.      Pupils: Pupils are equal, round, and reactive to light.      Comments: No scleral icterus or conjunctival injection appreciated.   Cardiovascular:      Rate and Rhythm: Normal rate and regular rhythm. No clicks rubs or gallops, normal S1 and S2.No peripheral stigmata of endocarditis appreciated.     Pulmonary:   Air exchange improved throughout today without appreciation of adventitious sounds.  Abdominal:      General: Abdomen soft, nontender, active bowel sounds, no  involuntary guarding or rebound tenderness appreciated.     Comments: None   Musculoskeletal:       Patient appeared to have full active range of motion for upper and lower extremities, no acute apparent joint deformity appreciated on examination.   No pitting edema or cyanosis appreciated.       Lymphadenopathy:      No appreciable palpable lymphadenopathy  Skin:     General: Skin is warm.      Coloration:  No jaundice     Findings: No abnormal appearing skin rashes or lesions that appeared acute noted on unclothed area of the skin..   Neurological:      General: No focal sensory or motor deficits appreciated, no meningeal signs or dysmetria noted.      Cranial Nerves: Cranial nerves II to XII appearing grossly intact.     Genitals:  Deferred  Psychiatric:                   Lab Results   Component Value Date     GLUCOSE 164 (H) 05/04/2024     CALCIUM 9.4 05/04/2024      05/04/2024     K 4.3 05/04/2024     CO2 36 (H) 05/04/2024      05/04/2024     BUN 15 05/04/2024     CREATININE 0.40 (L) 05/04/2024                Lab Results   Component Value Date     WBC 9.0 05/04/2024     HGB 13.5 05/04/2024     HCT 41.0 05/04/2024      05/04/2024      05/04/2024      Lab Results   Component Value Date    GLUCOSE 145 (H) 05/06/2024    CALCIUM 9.3 05/06/2024     05/06/2024    K 3.9 05/06/2024    CO2 43 (HH) 05/06/2024    CL 98 05/06/2024    BUN 17 05/06/2024    CREATININE 0.46 (L) 05/06/2024         [unfilled]   [unfilled]   No results found for the last 90 days.                  X-rays/ Images     [unfilled]   Procedure Component Value Units Date/Time   CT angio chest for pulmonary embolism [[id number]] Collected: 05/02/24 2347   Order Status: Completed Updated: 05/02/24 2347   Narrative:     Interpreted By:  Froy Ayon,  STUDY:  CT ANGIO CHEST FOR PULMONARY EMBOLISM;  5/2/2024 11:12 pm      INDICATION:  Signs/Symptoms:Shortness of breath, history of lung cancer in  addition to prior  PE.      COMPARISON:  CT scan of the chest 12/28/2023      ACCESSION NUMBER(S):  [id number]      ORDERING CLINICIAN:  DAKSHA SALAS      TECHNIQUE:  Helical data acquisition of the chest was obtained after intravenous  administration of  75 mL of Omnipaque 350. Images were reformatted in  axial, coronal, and sagittal planes. Axial and coronal MIPS were  reconstructed and reviewed.      FINDINGS:  HEART AND VESSELS:  No acute pulmonary embolism to the  segmental arterial level.      Main pulmonary artery and its branches are normal in caliber.      The thoracic aorta is of normal course and caliber  with diffuse  vascular calcifications.      Severe coronary artery calcifications are seen.The study is not  optimized for evaluation of coronary arteries.      The cardiac chambers are not enlarged.      No evidence of pericardial effusion.      MEDIASTINUM AND SHELIA, LOWER NECK AND AXILLA:  The visualized thyroid gland is within normal limits.      No evidence of thoracic lymphadenopathy by CT criteria.      Esophagus appears within normal limits as seen.      LUNGS AND AIRWAYS:  The trachea and central airways are patent. No endobronchial lesion.      Respiratory motion artifact limits evaluation of the lung parenchyma.  Centrilobular and paraseptal emphysema noted. There is  redemonstration of a lobulated 1.3 x 2.9 cm lesion in the left lower  lobe which is stable from prior CT. Additional 4-5 mm pulmonary  nodules are noted 1 of which in the right lower lobe seen on axial  image 114 of 358 appears to be new. No consolidation, effusion or  pneumothorax. Expiratory phase of imaging.      UPPER ABDOMEN:  Question subtle nodular contour of the liver. Subcentimeter hypodense  focus in the left hepatic lobe is too small to characterize. There is  a 2.3 cm hypodense lesion in the periphery of the right hepatic lobe  demonstrating fluid attenuation suggestive of a cyst. Nodular  thickening of the adrenal glands may relate to  hyperplasia or  adenomatous change.      CHEST WALL AND OSSEOUS STRUCTURES:  Generalized diffuse osteopenia. There is moderate compression  deformity of the L1 vertebral body with approximate 50% loss of  vertebral body height and 5 mm retropulsion into the canal. Findings  are stable from prior lumbar spine x-rays 04/11/2024. Multilevel  degenerative changes are present.       Impression:     1. No acute pulmonary embolism to the segmental arterial level.  2. Centrilobular and paraseptal emphysema noted. There is a 1.3 x 2.9  cm lobulated nodule in the left lower lobe consistent with patient's  known history of malignancy. There several additional 4-5 mm nodules  1 of which appears to be new as described above. Attention on  continued follow-up is advised.  3. Question subtle nodularity of the liver contour which can be seen  in the setting of cirrhosis. Correlate with liver function tests.  4. Additional findings as noted above.      MACRO:  None      Signed by: Froy Ayon 5/2/2024 11:46 PM  Dictation workstation:   [id number]   XR chest 1 view [[id number]] Collected: 05/02/24 2032   Order Status: Completed Updated: 05/02/24 2034   Narrative:     Interpreted By:  Rigoberto Terrazas,  STUDY:  XR CHEST 1 VIEW;  5/2/2024 8:06 pm      INDICATION:  Signs/Symptoms:SOB.      COMPARISON:  02/09/2024      ACCESSION NUMBER(S):  [id number]      ORDERING CLINICIAN:  DAKSHA SALAS      FINDINGS:          The cardiomediastinal silhouette and pulmonary vasculature are within  normal limits. There is redemonstration of a solid pulmonary nodule  in the left lower lobe similar to prior study. No new airspace  opacity. No pleural effusion. No consolidation, pleural effusion or  pneumothorax.           Impression:     No acute cardiopulmonary process.      Redemonstration of left lower lobe pulmonary malignancy. Overall no  significant change in size radiographically from 02/09/2024.  Continued oncological follow-up  recommended.      MACRO:  None.      Signed by: Rigoberto Nini 5/2/2024 8:31 PM  Dictation workstation:   [id number]                  Medical Problems         Problem List         * (Principal) COPD exacerbation (Multi)     Atherosclerotic heart disease of native coronary artery without angina pectoris     Overview Signed 10/4/2023 11:53 AM by Vikki Marquez MD       On IsosorbideER 30 mg daily, Furosemide 20 mg daily, Pravastatin. No seen cardiology in one year. Cardiologist was in [place]. She needs new cardiologist.            Chronic obstructive pulmonary disease, unspecified (Multi)     Overview Signed 12/14/2023 11:57 AM by Marine Levi       Continue home medications.           Difficulty in walking, not elsewhere classified     Mixed hyperlipidemia     Ulcerative colitis, unspecified, without complications (Multi)     Overview Signed 10/4/2023 12:02 PM by Vikki Marquez MD       Diagnosed 2009, has been on Budesonide. Last GI follow up was in 2016. She has not had follow up. She needs new gastro in local area per patient. Prior was in [place].            Presence of stent in coronary artery     Overview Signed 10/4/2023 11:47 AM by Vikki Marquez MD       2009,            History of pulmonary embolism     Overview Signed 10/4/2023 12:00 PM by Vikki Marquez MD       Reviewed old records. 10/6/2021, had CT contrast that showed no PE but did show nodules. Lost to follow up. In Hospital 8/26, repeat CT with increase in lung mass and no PE. She does have issues with falling and has been on Eliquis since after her fibula fracture in 2021. Do not feel she has indication for long term use and risk outweighs benefit.           Cigarette smoker     Overview Signed 10/4/2023 12:28 PM by Vikki Marquez MD       Started 1956. Has cut back to intermittent only. 2 ppd until recently. 67 times 2 ppd so 124 pack years.  Smoked every day until 5 weeks ago. Now only couple a week.  Discussed nicotine replacement. She does not want at this time.            Lung nodules     Lymphocytic colitis     Other acute pulmonary embolism, unspecified whether acute cor pulmonale present (Multi)     Primary cancer of left lower lobe of lung (Multi)     Nicotine dependence     Chronic respiratory failure with hypoxia (Multi)     Age-related physical debility     Long term (current) use of anticoagulants     Hypertensive heart disease with heart failure (Multi)     Pathological fracture due to osteoporosis     Compression fracture of lumbar vertebra (Multi)     Elevated hemoglobin A1c                  Above medical problems may be reflective of historical medical problems that may have resolved and may not related to acute clinical condition/medical problems.     Clinical impression/plan:     Assessment and Plan  #1 COPD exacerbation  Patient with desaturation with minimal exertion.  Patient is to continue with O2 support as necessary.  Patient chronically on 3 L at home currently requiring 4 L.  Patient will be placed on IV steroids as well as beta agonist.  Patient will be placed on doxycycline empirically.     #2 adenocarcinoma of the lung  Patient was diagnosed with this back in February but has yet to have any treatment for this.  I suspect that patient is delaying any type of treatment.  She states that she wants to get new glasses and hearing aids done before she has any type of chemo.  Patient is having a hard time getting to and from appointments etc. patient is making excuses.  Patient has no chest pain or pleuritic discomfort from the lung malignancy and I think she is just ending and I will and maybe does not want anything to be done at all with that realizing that she does not have any type of quality of life because she did has no exertional capabilities at present time.     #3 remote history of pulmonary emboli  Patient on chronic anticoagulation with Eliquis     #4 coronary artery disease  Not  certain when last stress test was performed.  Patient is not having any chest pain with exertion just increasing amount of shortness of breath with minimal amount of exertion.  She is desatting during these events doubt if it is ischemia induced dyspnea.     #5 hypertension  Resume home meds           Disposition/additional care plan/interventions: 5/5/2024       Scattered wheezes appreciated today but isolated and less coarse in nature.     Worsening blood glucose levels secondary to  needed corticosteroids will continue to monitor.  Low-carb diet.  Respiratory disposition appears to be showing interval improvement for acute exacerbation of.     Consult to psychiatric service concerning possible underlying depression.     Mucinex will be added to patient's treatment plan     Anticipate weaning corticosteroids in next 24 hours.  Patient appeared to have overall poor respiratory reserve.  Anticipate slow recovery from COPD exacerbation will continue close monitoring    Disposition/additional care plan/interventions: 5/6/2024      De-escalate corticosteroids    Consult to psychiatry patient appeared to be having great difficulty coping with her chronic illness and prognosis.    Monitor electrolytes    Continue supportive care for COPD.  Chronic lung disease with overall poor respiratory and physiological reserve suspected.  Will continue supportive measures.      The patient was informed of differential diagnosis , work up , plan of care and possible sequelae of clinical disposition.Patient in agreement with plan of care. Further recommendations forthcoming in accordance with patient's clinical disposition and response to care.     Discharge planning:Discharge timing to be determined.     Care time: >55 mins              Dictation performed with assistance of voice recognition device therefore transcription errors are possible.

## 2024-05-06 NOTE — PROGRESS NOTES
Abigail Aquino was referred to the Clinical Pharmacy Team to complete a virtual Transitions of Care encounter for discharge medication optimization. The patient was referred for their [disease state(s)]. The primary goal of this encounter is to ensure the patient's medications are both clinically appropriate and financially feasible for the patient. Pharmacy clinical interventions were provided as noted below.     Attending: Tonny Cope  _______________________________________________________________________  PHARMACY ASSESSMENT    Meds to beds? [yes]   Home Pharmacy: [id number] - [place], [address] AT [facility]   Allergies Reviewed? [yes]    No issues reported in regards to [accessibility, affordability, adherence, adverse effects, or organization]  _______________________________________________________________________  ASTHMA/COPD ASSESSMENT    CURRENT PHARMACOTHERAPY  Scheduled medications  apixaban, 5 mg, oral, BID  budesonide EC, 3 mg, oral, q AM  docusate sodium, 100 mg, oral, BID  doxycycline, 100 mg, intravenous, q12h  tiotropium, 2 puff, inhalation, Daily   And  fluticasone furoate-vilanteroL, 1 puff, inhalation, Daily  furosemide, 20 mg, oral, Daily  isosorbide mononitrate ER, 30 mg, oral, Daily  methylPREDNISolone sodium succinate (PF), 20 mg, intravenous, q12h  metoprolol tartrate, 50 mg, oral, BID  oxygen, , inhalation, Continuous - Inhalation  pravastatin, 40 mg, oral, Daily    HISTORICAL PHARMACOTHERAPY  Current Outpatient Medications on File Prior to Encounter   Medication Sig Dispense Refill    albuterol 90 mcg/actuation inhaler INHALE 2 PUFFS BY MOUTH FOUR TIMES A DAY AS NEEDED FOR SHORTNESS OF BREATH 18 g 1    budesonide EC (Entocort EC) 3 mg 24 hr capsule Take 1 capsule (3 mg) by mouth once daily in the morning.      budesonide-glycopyr-formoterol (Breztri Aerosphere) 160-9-4.8 mcg/actuation HFA aerosol inhaler Inhale 2 puffs 2 times a day. 24 g 11    Eliquis  5 mg tablet Take 1 tablet (5 mg) by mouth 2 times a day.      fluticasone furoate-vilanteroL (Breo Ellipta) 200-25 mcg/dose inhaler Inhale 1 puff once daily.      furosemide (Lasix) 20 mg tablet TAKE 1 TABLET BY MOUTH ONCE DAILY 90 tablet 0    ipratropium-albuteroL (Duo-Neb) 0.5-2.5 mg/3 mL nebulizer solution Take 3 mL by nebulization 4 times a day as needed for wheezing or shortness of breath. (Patient not taking: Reported on 4/11/2024) 360 mL 11    isosorbide mononitrate ER (Imdur) 30 mg 24 hr tablet Take 1 tablet (30 mg) by mouth once daily.      lidocaine (Lidoderm) 5 % patch Place 1 patch on the skin once daily. Leave on for 12 hours, remove for 12 hours 10 patch 0    metoprolol tartrate (Lopressor) 50 mg tablet Take 1 tablet by mouth 2 times a day.      pravastatin (Pravachol) 40 mg tablet Take 1 tablet (40 mg) by mouth once daily.      tiotropium (Spiriva Respimat) 2.5 mcg/actuation inhaler Inhale 2 puffs once daily.      [DISCONTINUED] furosemide (Lasix) 20 mg tablet TAKE 1 TABLET BY MOUTH ONCE DAILY 90 tablet 0     RESCUE INHALER USE  -multiple times per day    INHALER TECHNIQUE  - verified inhaler technique    SECONDARY PREVENTION  Influenza, Seasonal, Quadrivalent, [id number]/15/2023  Influenza, seasonal, ebujufmsvg97/26/2021  PPD Test10/18/2021, 10/11/2021  Pfizer COVID-19 vaccine, bivalent, age 12 years and older (30 mcg/0.3 mL)11/30/2022  Pfizer Purple Cap SARS-CoV-210/12/2021, 3/27/2021, 3/6/2021  Pneumococcal conjugate vaccine, 20-valent (PREVNAR 20)12/15/2023  Pneumococcal polysaccharide vaccine, 23-valent, age 2 years and older (PNEUMOVAX 23)10/29/2021, 5/16/2014    EXACERBATION HISTORY  - When was your last hospitalization for an exacerbation? 2/14/2024  - When was the last time you were treated with antibiotics and/or steroids? Feb 2024    SMOKING CESSATION  Patient is not currently using tobacco products  - Quit Date: August 2023  - Years Smoking: most of adult  life  ___________________________________________________________________  PATIENT EDUCATION/GOALS  - Fasting B - 130 mg/dL  - Postprandial BG: less than 180 mg/dL  - A1c: less than 7%  - LDL Goal: < 70mg/dL  - Answered all patient questions and concerns    - Your blood pressure goal is less than 130/80 mmHg  - Try to integrate exercise into your weekly routine. The recommended exercise regimen is 30-40 minutes of moderate-intensity exercise (such as jogging, biking, swimming, etc.) for 5 days a week.  - Try to work on eating healthy, balanced, appropriately portioned meals 3 times a day. The DASH diet is the recommended diet plan for patients with high blood pressure.  -Limit salt intake and avoid foods high in sodium such as processed meats, salty snacks, and fast food. The recommended daily maximum sodium intake is less than 2,300mg (2 teaspoons) per day.  -Limit cream/sugar additions to coffee and avoid unhealthy caffeinated drinks such as energy drinks or soda.     -Educated patient on proper inhaler technique and proper inhaler cleaning.  _______________________________________________________________________  RECOMMENDATIONS/PLAN  Please send prescriptions to [facility] pharmacy for assistance on insurance prior authorization and copay. Prescriptions will be delivered to the patient's bedside prior to discharge with the Meds to [facility] program.   Continuation of care will be provided by the outpatient clinical pharmacy team in collaboration with the patient's  Primary Care Provider     Verbal consent to manage patient's drug therapy was obtained from the patient. They were informed they may decline to participate or withdraw from participation in pharmacy services at any time.

## 2024-05-07 LAB
ANION GAP SERPL CALC-SCNC: <7 MMOL/L (ref 10–20)
BUN SERPL-MCNC: 23 MG/DL (ref 6–23)
CALCIUM SERPL-MCNC: 8.9 MG/DL (ref 8.6–10.3)
CHLORIDE SERPL-SCNC: 98 MMOL/L (ref 98–107)
CO2 SERPL-SCNC: 40 MMOL/L (ref 21–32)
CREAT SERPL-MCNC: 0.48 MG/DL (ref 0.5–1.05)
EGFRCR SERPLBLD CKD-EPI 2021: >90 ML/MIN/1.73M*2
ERYTHROCYTE [DISTWIDTH] IN BLOOD BY AUTOMATED COUNT: 13.2 % (ref 11.5–14.5)
GLUCOSE SERPL-MCNC: 138 MG/DL (ref 74–99)
HCT VFR BLD AUTO: 43.9 % (ref 36–46)
HGB BLD-MCNC: 13.6 G/DL (ref 12–16)
MCH RBC QN AUTO: 31.3 PG (ref 26–34)
MCHC RBC AUTO-ENTMCNC: 31 G/DL (ref 32–36)
MCV RBC AUTO: 101 FL (ref 80–100)
NRBC BLD-RTO: 0 /100 WBCS (ref 0–0)
PLATELET # BLD AUTO: 244 X10*3/UL (ref 150–450)
POTASSIUM SERPL-SCNC: 3.5 MMOL/L (ref 3.5–5.3)
RBC # BLD AUTO: 4.35 X10*6/UL (ref 4–5.2)
SODIUM SERPL-SCNC: 140 MMOL/L (ref 136–145)
WBC # BLD AUTO: 8.3 X10*3/UL (ref 4.4–11.3)

## 2024-05-07 PROCEDURE — 2500000001 HC RX 250 WO HCPCS SELF ADMINISTERED DRUGS (ALT 637 FOR MEDICARE OP): Performed by: HOSPITALIST

## 2024-05-07 PROCEDURE — 99233 SBSQ HOSP IP/OBS HIGH 50: CPT | Performed by: HOSPITALIST

## 2024-05-07 PROCEDURE — 36415 COLL VENOUS BLD VENIPUNCTURE: CPT | Performed by: HOSPITALIST

## 2024-05-07 PROCEDURE — 85027 COMPLETE CBC AUTOMATED: CPT | Performed by: HOSPITALIST

## 2024-05-07 PROCEDURE — 2500000004 HC RX 250 GENERAL PHARMACY W/ HCPCS (ALT 636 FOR OP/ED): Performed by: HOSPITALIST

## 2024-05-07 PROCEDURE — 1200000002 HC GENERAL ROOM WITH TELEMETRY DAILY

## 2024-05-07 PROCEDURE — 2500000005 HC RX 250 GENERAL PHARMACY W/O HCPCS: Performed by: INTERNAL MEDICINE

## 2024-05-07 PROCEDURE — 80048 BASIC METABOLIC PNL TOTAL CA: CPT | Performed by: HOSPITALIST

## 2024-05-07 PROCEDURE — 2500000004 HC RX 250 GENERAL PHARMACY W/ HCPCS (ALT 636 FOR OP/ED): Performed by: INTERNAL MEDICINE

## 2024-05-07 PROCEDURE — 2500000001 HC RX 250 WO HCPCS SELF ADMINISTERED DRUGS (ALT 637 FOR MEDICARE OP): Performed by: INTERNAL MEDICINE

## 2024-05-07 RX ORDER — LORAZEPAM 0.5 MG/1
0.5 TABLET ORAL EVERY 8 HOURS PRN
Status: DISCONTINUED | OUTPATIENT
Start: 2024-05-07 | End: 2024-05-07

## 2024-05-07 RX ADMIN — DOXYCYCLINE 100 MG: 100 INJECTION, POWDER, LYOPHILIZED, FOR SOLUTION INTRAVENOUS at 09:25

## 2024-05-07 RX ADMIN — DOCUSATE SODIUM 100 MG: 100 CAPSULE, LIQUID FILLED ORAL at 20:09

## 2024-05-07 RX ADMIN — LORAZEPAM 0.5 MG: 0.5 TABLET ORAL at 09:25

## 2024-05-07 RX ADMIN — APIXABAN 5 MG: 5 TABLET, FILM COATED ORAL at 20:08

## 2024-05-07 RX ADMIN — METHYLPREDNISOLONE SODIUM SUCCINATE 20 MG: 40 INJECTION, POWDER, FOR SOLUTION INTRAMUSCULAR; INTRAVENOUS at 20:17

## 2024-05-07 RX ADMIN — ONDANSETRON HYDROCHLORIDE 4 MG: 4 TABLET, FILM COATED ORAL at 20:08

## 2024-05-07 RX ADMIN — Medication 4 L/MIN: at 20:00

## 2024-05-07 RX ADMIN — GUAIFENESIN 600 MG: 600 TABLET ORAL at 20:08

## 2024-05-07 RX ADMIN — PRAVASTATIN SODIUM 40 MG: 40 TABLET ORAL at 09:25

## 2024-05-07 RX ADMIN — ACETAMINOPHEN 650 MG: 325 TABLET ORAL at 20:08

## 2024-05-07 RX ADMIN — FUROSEMIDE 20 MG: 20 TABLET ORAL at 09:25

## 2024-05-07 RX ADMIN — METHYLPREDNISOLONE SODIUM SUCCINATE 20 MG: 40 INJECTION, POWDER, FOR SOLUTION INTRAMUSCULAR; INTRAVENOUS at 09:24

## 2024-05-07 RX ADMIN — METOPROLOL TARTRATE 50 MG: 50 TABLET, FILM COATED ORAL at 20:08

## 2024-05-07 RX ADMIN — TIOTROPIUM BROMIDE INHALATION SPRAY 2 PUFF: 3.12 SPRAY, METERED RESPIRATORY (INHALATION) at 09:24

## 2024-05-07 RX ADMIN — APIXABAN 5 MG: 5 TABLET, FILM COATED ORAL at 09:25

## 2024-05-07 RX ADMIN — ISOSORBIDE MONONITRATE 30 MG: 30 TABLET, EXTENDED RELEASE ORAL at 09:25

## 2024-05-07 RX ADMIN — FLUTICASONE FUROATE AND VILANTEROL TRIFENATATE 1 PUFF: 200; 25 POWDER RESPIRATORY (INHALATION) at 09:24

## 2024-05-07 RX ADMIN — METOPROLOL TARTRATE 50 MG: 50 TABLET, FILM COATED ORAL at 09:25

## 2024-05-07 RX ADMIN — DOXYCYCLINE 100 MG: 100 INJECTION, POWDER, LYOPHILIZED, FOR SOLUTION INTRAVENOUS at 20:18

## 2024-05-07 RX ADMIN — Medication 4 L/MIN: at 08:00

## 2024-05-07 ASSESSMENT — COGNITIVE AND FUNCTIONAL STATUS - GENERAL
STANDING UP FROM CHAIR USING ARMS: A LITTLE
WALKING IN HOSPITAL ROOM: A LITTLE
STANDING UP FROM CHAIR USING ARMS: A LITTLE
DRESSING REGULAR UPPER BODY CLOTHING: A LITTLE
WALKING IN HOSPITAL ROOM: A LITTLE
DRESSING REGULAR UPPER BODY CLOTHING: A LITTLE
DAILY ACTIVITIY SCORE: 20
DAILY ACTIVITIY SCORE: 20
TOILETING: A LITTLE
HELP NEEDED FOR BATHING: A LITTLE
CLIMB 3 TO 5 STEPS WITH RAILING: A LITTLE
MOBILITY SCORE: 20
MOBILITY SCORE: 20
TOILETING: A LITTLE
MOVING TO AND FROM BED TO CHAIR: A LITTLE
HELP NEEDED FOR BATHING: A LITTLE
DRESSING REGULAR LOWER BODY CLOTHING: A LITTLE
DRESSING REGULAR LOWER BODY CLOTHING: A LITTLE
MOVING TO AND FROM BED TO CHAIR: A LITTLE
CLIMB 3 TO 5 STEPS WITH RAILING: A LITTLE

## 2024-05-07 ASSESSMENT — ENCOUNTER SYMPTOMS
EYES NEGATIVE: 1
GASTROINTESTINAL NEGATIVE: 1
CARDIOVASCULAR NEGATIVE: 1
NERVOUS/ANXIOUS: 1
DYSPHORIC MOOD: 1
MUSCULOSKELETAL NEGATIVE: 1
WEAKNESS: 1
CONSTITUTIONAL NEGATIVE: 1
SHORTNESS OF BREATH: 1
ENDOCRINE NEGATIVE: 1
COUGH: 1
ALLERGIC/IMMUNOLOGIC NEGATIVE: 1
HEMATOLOGIC/LYMPHATIC NEGATIVE: 1

## 2024-05-07 ASSESSMENT — PAIN SCALES - GENERAL
PAINLEVEL_OUTOF10: 0 - NO PAIN
PAINLEVEL_OUTOF10: 0 - NO PAIN

## 2024-05-07 NOTE — PROGRESS NOTES
Abigail Aquino [id number]   Service: Internal Medicine / Hospitalist Date of service: 5/7/2024                                  Full Code         Subjective     History Of Present Illness  Abigail Aquino is a 79 y.o. female with past medical history of O2 dependent COPD/emphysema, coronary artery disease, adenocarcinoma of the left lung, remote pulmonary emboli, hypertension, dyslipidemia presenting with increasing shortness of breath.  Patient states that she has intermittent episodes where she just will desat into the 70s at home with minimal exertion.  She is able to rest and recover.  She states that the last day and a half she has noted wheezing and increasing shortness of breath and less endurance.  She denies having any chest pain with this she denies having any fevers or chills.  Patient does have a cough.  In the emergency room patient was given multiple breathing treatments.  Chest x-ray does not show any acute findings but shows evidence of her untreated adenocarcinoma of the lung.  Patient's condition improved and was being set up for discharge when she got up to get ready to leave she desatted to 75 on her O2 supplementation.  Patient is now on 4 L nasal cannula satting 94%.  When the patient has no functional reserves with desaturation with minimal activity and lives alone with no significant support other than a family across the street will help patient for payment patient is being admitted to the hospital for COPD exacerbation.  She will be placed on IV steroids beta agonist.  We will ask  to look into resources for patient whether it is home health order for placement.     5/3..............The patient voiced her frustration concerning her respiratory status.  Reports that she felt a bit better since admission but admits to recurrence of COPD exacerbation.  No reported : fevers, chills, homicidal ideation, suicidal ideation, chest pains palpitations, nausea or vomiting.      5/4..................No reported :headaches, chest pains, nausea, vomiting, fevers or chills.Patient still endorsed coughing and congestion.     5/5..................Patient requesting a mucolytic no reported headaches, chest pains, fevers or chills.  Patient tearful concerning her chronic respiratory disposition O2 dependency.  She requested that she will likely need help at home.  Social service consultation was placed.  No reported: Hallucinations, homicidal ideation, suicidal ideation.     5/6.....................Patient very tearful this morning at the time of evaluation felt that her overall illness is overwhelming.  No reported suicidal or homicidal ideation voiced by patient.  However patient did perform feeling depressed.  No reported: Headaches, diplopia, syncope, presyncope, nausea, vomiting, fevers or chills.    5/7.........................No reported: Chest pains, headaches, nausea, vomiting, hallucinations, suicidal or homicidal ideation.  Provisional report of patient reported being paranoid this morning.On discussion with patient no reported: Headaches, blurred vision, focal weakness or difficulties with speech.  Patient stated that she felt that no one really was understanding what she was trying to say she felt that things were not the same in her room.  She was later able to orientate herself and has done well throughout the day today.        Review of Systems:   Review of system otherwise negative if not aforementioned above in subjective.     Objective        Physical Exam      Constitutional:       Appearance: Patient appeared in no acute cardiopulmonary distress.     Comments: Patient alert and oriented to person place time and situation.Patient able to speak in full sentences no difficulties.  HEENT:      Head: Normocephalic and atraumatic.Trachea midline      Nose:No observed congestion or rhinorrhea.     Mouth/Throat: Mucous membranes Moist, Trachea appeared  midline.  Eyes:       Extraocular Movements: Extraocular movements intact.      Pupils: Pupils are equal, round, and reactive to light.      Comments: No scleral icterus or conjunctival injection appreciated.   Cardiovascular:      Rate and Rhythm: Normal rate and regular rhythm. No clicks rubs or gallops, normal S1 and S2.No peripheral stigmata of endocarditis appreciated.     Pulmonary:   Air exchange improved throughout today without appreciation of adventitious sounds.  Abdominal:      General: Abdomen soft, nontender, active bowel sounds, no involuntary guarding or rebound tenderness appreciated.     Comments: None   Musculoskeletal:       Patient appeared to have full active range of motion for upper and lower extremities, no acute apparent joint deformity appreciated on examination.   No pitting edema or cyanosis appreciated.       Lymphadenopathy:      No appreciable palpable lymphadenopathy  Skin:     General: Skin is warm.      Coloration:  No jaundice     Findings: No abnormal appearing skin rashes or lesions that appeared acute noted on unclothed area of the skin..   Neurological:      General: No focal sensory or motor deficits appreciated, no meningeal signs or dysmetria noted.      Cranial Nerves: Cranial nerves II to XII appearing grossly intact.     Genitals:  Deferred  Psychiatric:                   Lab Results   Component Value Date     GLUCOSE 164 (H) 05/04/2024     CALCIUM 9.4 05/04/2024      05/04/2024     K 4.3 05/04/2024     CO2 36 (H) 05/04/2024      05/04/2024     BUN 15 05/04/2024     CREATININE 0.40 (L) 05/04/2024                Lab Results   Component Value Date     WBC 9.0 05/04/2024     HGB 13.5 05/04/2024     HCT 41.0 05/04/2024      05/04/2024      05/04/2024            Lab Results   Component Value Date     GLUCOSE 145 (H) 05/06/2024     CALCIUM 9.3 05/06/2024      05/06/2024     K 3.9 05/06/2024     CO2 43 (HH) 05/06/2024     CL 98 05/06/2024     BUN 17 05/06/2024      CREATININE 0.46 (L) 05/06/2024         [unfilled]   [unfilled]   No results found for the last 90 days.                  X-rays/ Images     [unfilled]   Procedure Component Value Units Date/Time   CT angio chest for pulmonary embolism [[id number]] Collected: 05/02/24 2347   Order Status: Completed Updated: 05/02/24 2347   Narrative:     Interpreted By:  Froy Ayon,  STUDY:  CT ANGIO CHEST FOR PULMONARY EMBOLISM;  5/2/2024 11:12 pm      INDICATION:  Signs/Symptoms:Shortness of breath, history of lung cancer in  addition to prior PE.      COMPARISON:  CT scan of the chest 12/28/2023      ACCESSION NUMBER(S):  [id number]      ORDERING CLINICIAN:  DAKSHA SALAS      TECHNIQUE:  Helical data acquisition of the chest was obtained after intravenous  administration of  75 mL of Omnipaque 350. Images were reformatted in  axial, coronal, and sagittal planes. Axial and coronal MIPS were  reconstructed and reviewed.      FINDINGS:  HEART AND VESSELS:  No acute pulmonary embolism to the  segmental arterial level.      Main pulmonary artery and its branches are normal in caliber.      The thoracic aorta is of normal course and caliber  with diffuse  vascular calcifications.      Severe coronary artery calcifications are seen.The study is not  optimized for evaluation of coronary arteries.      The cardiac chambers are not enlarged.      No evidence of pericardial effusion.      MEDIASTINUM AND SHELIA, LOWER NECK AND AXILLA:  The visualized thyroid gland is within normal limits.      No evidence of thoracic lymphadenopathy by CT criteria.      Esophagus appears within normal limits as seen.      LUNGS AND AIRWAYS:  The trachea and central airways are patent. No endobronchial lesion.      Respiratory motion artifact limits evaluation of the lung parenchyma.  Centrilobular and paraseptal emphysema noted. There is  redemonstration of a lobulated 1.3 x 2.9 cm lesion in the left lower  lobe which is stable from prior CT.  Additional 4-5 mm pulmonary  nodules are noted 1 of which in the right lower lobe seen on axial  image 114 of 358 appears to be new. No consolidation, effusion or  pneumothorax. Expiratory phase of imaging.      UPPER ABDOMEN:  Question subtle nodular contour of the liver. Subcentimeter hypodense  focus in the left hepatic lobe is too small to characterize. There is  a 2.3 cm hypodense lesion in the periphery of the right hepatic lobe  demonstrating fluid attenuation suggestive of a cyst. Nodular  thickening of the adrenal glands may relate to hyperplasia or  adenomatous change.      CHEST WALL AND OSSEOUS STRUCTURES:  Generalized diffuse osteopenia. There is moderate compression  deformity of the L1 vertebral body with approximate 50% loss of  vertebral body height and 5 mm retropulsion into the canal. Findings  are stable from prior lumbar spine x-rays 04/11/2024. Multilevel  degenerative changes are present.       Impression:     1. No acute pulmonary embolism to the segmental arterial level.  2. Centrilobular and paraseptal emphysema noted. There is a 1.3 x 2.9  cm lobulated nodule in the left lower lobe consistent with patient's  known history of malignancy. There several additional 4-5 mm nodules  1 of which appears to be new as described above. Attention on  continued follow-up is advised.  3. Question subtle nodularity of the liver contour which can be seen  in the setting of cirrhosis. Correlate with liver function tests.  4. Additional findings as noted above.      MACRO:  None      Signed by: Froy Ayon 5/2/2024 11:46 PM  Dictation workstation:   [id number]   XR chest 1 view [[id number]] Collected: 05/02/24 2032   Order Status: Completed Updated: 05/02/24 2034   Narrative:     Interpreted By:  Rigoberto Terrazas,  STUDY:  XR CHEST 1 VIEW;  5/2/2024 8:06 pm      INDICATION:  Signs/Symptoms:SOB.      COMPARISON:  02/09/2024      ACCESSION NUMBER(S):  [id number]      ORDERING CLINICIAN:  DAKSHA SALAS       FINDINGS:          The cardiomediastinal silhouette and pulmonary vasculature are within  normal limits. There is redemonstration of a solid pulmonary nodule  in the left lower lobe similar to prior study. No new airspace  opacity. No pleural effusion. No consolidation, pleural effusion or  pneumothorax.           Impression:     No acute cardiopulmonary process.      Redemonstration of left lower lobe pulmonary malignancy. Overall no  significant change in size radiographically from 02/09/2024.  Continued oncological follow-up recommended.      MACRO:  None.      Signed by: Rigoberto Terrazas 5/2/2024 8:31 PM  Dictation workstation:   [id number]                  Medical Problems         Problem List         * (Principal) COPD exacerbation (Multi)     Atherosclerotic heart disease of native coronary artery without angina pectoris     Overview Signed 10/4/2023 11:53 AM by Vikki Marquez MD       On IsosorbideER 30 mg daily, Furosemide 20 mg daily, Pravastatin. No seen cardiology in one year. Cardiologist was in [place]. She needs new cardiologist.            Chronic obstructive pulmonary disease, unspecified (Multi)     Overview Signed 12/14/2023 11:57 AM by Marine Levi       Continue home medications.           Difficulty in walking, not elsewhere classified     Mixed hyperlipidemia     Ulcerative colitis, unspecified, without complications (Multi)     Overview Signed 10/4/2023 12:02 PM by Vikki Marquez MD       Diagnosed 2009, has been on Budesonide. Last GI follow up was in 2016. She has not had follow up. She needs new gastro in local area per patient. Prior was in [place].            Presence of stent in coronary artery     Overview Signed 10/4/2023 11:47 AM by Vikki Marquez MD       2009,            History of pulmonary embolism     Overview Signed 10/4/2023 12:00 PM by Vikki Marquez MD       Reviewed old records. 10/6/2021, had CT contrast that showed no PE but did show  nodules. Lost to follow up. In Hospital 8/26, repeat CT with increase in lung mass and no PE. She does have issues with falling and has been on Eliquis since after her fibula fracture in 2021. Do not feel she has indication for long term use and risk outweighs benefit.           Cigarette smoker     Overview Signed 10/4/2023 12:28 PM by Vikki Marquez MD       Started 1956. Has cut back to intermittent only. 2 ppd until recently. 67 times 2 ppd so 124 pack years.  Smoked every day until 5 weeks ago. Now only couple a week. Discussed nicotine replacement. She does not want at this time.            Lung nodules     Lymphocytic colitis     Other acute pulmonary embolism, unspecified whether acute cor pulmonale present (Multi)     Primary cancer of left lower lobe of lung (Multi)     Nicotine dependence     Chronic respiratory failure with hypoxia (Multi)     Age-related physical debility     Long term (current) use of anticoagulants     Hypertensive heart disease with heart failure (Multi)     Pathological fracture due to osteoporosis     Compression fracture of lumbar vertebra (Multi)     Elevated hemoglobin A1c                  Above medical problems may be reflective of historical medical problems that may have resolved and may not related to acute clinical condition/medical problems.     Clinical impression/plan:     Assessment and Plan  #1 COPD exacerbation  Patient with desaturation with minimal exertion.  Patient is to continue with O2 support as necessary.  Patient chronically on 3 L at home currently requiring 4 L.  Patient will be placed on IV steroids as well as beta agonist.  Patient will be placed on doxycycline empirically.     #2 adenocarcinoma of the lung  Patient was diagnosed with this back in February but has yet to have any treatment for this.  I suspect that patient is delaying any type of treatment.  She states that she wants to get new glasses and hearing aids done before she has any type  of chemo.  Patient is having a hard time getting to and from appointments etc. patient is making excuses.  Patient has no chest pain or pleuritic discomfort from the lung malignancy and I think she is just ending and I will and maybe does not want anything to be done at all with that realizing that she does not have any type of quality of life because she did has no exertional capabilities at present time.     #3 remote history of pulmonary emboli  Patient on chronic anticoagulation with Eliquis     #4 coronary artery disease  Not certain when last stress test was performed.  Patient is not having any chest pain with exertion just increasing amount of shortness of breath with minimal amount of exertion.  She is desatting during these events doubt if it is ischemia induced dyspnea.     #5 hypertension  Resume home meds              Disposition/additional care plan/interventions: 5/7/2024       De-escalate corticosteroidsfurther in the next 24 hours.  Clinically doubt steroid psychosis.     Consult to psychiatry patient appeared to be having great difficulty coping with her chronic illness and prognosis.  Nonfocal neurological examination.    Patient did display some difficulties at appears complete with overall diagnosis appears that since her respiratory status is improved with current treatment chair.  More relaxed today.  Will recommend stopping Ativan have to be mindful of CNS stimulation paradoxical.    Continue supportive care.    Await further recommendations from psychiatry     Monitor electrolytes     Continue supportive care for COPD.  Chronic lung disease with overall poor respiratory and physiological reserve suspected.  Will continue supportive measures.        The patient was informed of differential diagnosis , work up , plan of care and possible sequelae of clinical disposition.Patient in agreement with plan of care. Further recommendations forthcoming in accordance with patient's clinical disposition  and response to care.     Discharge planning:Discharge timing to be determined.     Care time: >55 mins              Dictation performed with assistance of voice recognition device therefore transcription errors are possible.

## 2024-05-07 NOTE — CARE PLAN
The clinical goals for the shift include Patient will remain free of falls or injury this shift.      Problem: Fall/Injury  Goal: Not fall by end of shift  Outcome: Progressing  Goal: Be free from injury by end of the shift  Outcome: Progressing  Goal: Verbalize understanding of personal risk factors for fall in the hospital  Outcome: Progressing  Goal: Verbalize understanding of risk factor reduction measures to prevent injury from fall in the home  Outcome: Progressing  Goal: Use assistive devices by end of the shift  Outcome: Progressing  Goal: Pace activities to prevent fatigue by end of the shift  Outcome: Progressing     Problem: Pain  Goal: My pain/discomfort is manageable  Outcome: Progressing     Problem: Safety  Goal: Patient will be injury free during hospitalization  Outcome: Progressing  Goal: I will remain free of falls  Outcome: Progressing     Problem: Daily Care  Goal: Daily care needs are met  Outcome: Progressing     Problem: Psychosocial Needs  Goal: Demonstrates ability to cope with hospitalization/illness  Outcome: Progressing  Goal: Collaborate with me, my family, and caregiver to identify my specific goals  Outcome: Progressing     Problem: Discharge Barriers  Goal: My discharge needs are met  Outcome: Progressing     Problem: Pain - Adult  Goal: Verbalizes/displays adequate comfort level or baseline comfort level  Outcome: Progressing     Problem: Safety - Adult  Goal: Free from fall injury  Outcome: Progressing     Problem: Discharge Planning  Goal: Discharge to home or other facility with appropriate resources  Outcome: Progressing     Problem: Chronic Conditions and Co-morbidities  Goal: Patient's chronic conditions and co-morbidity symptoms are monitored and maintained or improved  Outcome: Progressing

## 2024-05-07 NOTE — CARE PLAN
The patient's goals for the shift include      The clinical goals for the shift include Pt will remain free from injury and falls overnight          Problem: Fall/Injury  Goal: Not fall by end of shift  Outcome: Progressing  Goal: Be free from injury by end of the shift  Outcome: Progressing  Goal: Verbalize understanding of personal risk factors for fall in the hospital  Outcome: Progressing  Goal: Verbalize understanding of risk factor reduction measures to prevent injury from fall in the home  Outcome: Progressing  Goal: Use assistive devices by end of the shift  Outcome: Progressing  Goal: Pace activities to prevent fatigue by end of the shift  Outcome: Progressing     Problem: Pain  Goal: My pain/discomfort is manageable  Outcome: Progressing     Problem: Safety  Goal: Patient will be injury free during hospitalization  Outcome: Progressing  Goal: I will remain free of falls  Outcome: Progressing     Problem: Daily Care  Goal: Daily care needs are met  Outcome: Progressing     Problem: Psychosocial Needs  Goal: Demonstrates ability to cope with hospitalization/illness  Outcome: Progressing  Goal: Collaborate with me, my family, and caregiver to identify my specific goals  Outcome: Progressing     Problem: Discharge Barriers  Goal: My discharge needs are met  Outcome: Progressing     Problem: Pain - Adult  Goal: Verbalizes/displays adequate comfort level or baseline comfort level  Outcome: Progressing     Problem: Safety - Adult  Goal: Free from fall injury  Outcome: Progressing     Problem: Discharge Planning  Goal: Discharge to home or other facility with appropriate resources  Outcome: Progressing     Problem: Chronic Conditions and Co-morbidities  Goal: Patient's chronic conditions and co-morbidity symptoms are monitored and maintained or improved  Outcome: Progressing

## 2024-05-07 NOTE — PROGRESS NOTES
05/07/24 1050   Discharge Planning   Assistance Needed  notified of need for private pay agencies and or senior assistance programs in her area.     Waiting on ordered psych evaluation.

## 2024-05-07 NOTE — PROGRESS NOTES
"Music Therapy Note    Abigail E Carmineradha was referred by RN    Therapy Session  Referral Type: New referral this admission  Visit Type: New visit  Session Start Time: 1316  Session End Time: 1317  Intervention Delivery: In-person  Conflict of Service: None  Number of family members present: 0  Number of staff members present: 0     Pre-assessment  Unable to Assess Reason: Patient declined to answer  Mood/Affect: Anxious, Cooperative, Guarded, Disinterested  Verbalized Emotional State:  (\"I just want to eat alone.\")         Treatment/Interventions       Post-assessment  Unable to Assess Reason: Did not provide expressive therapy intervention  Total Session Time (min): 1 minutes    Narrative  Assessment Detail: Upon arrival of music therapist, pt was in room eating lunch. Pt engaged with minimal eye and verbal contact. Pt expressed that she just wanted to have some time to herself, as it is noted pt was eating lunch.  Plan: Music therapy was referred by staff with a focus on stress managment and socialization. MT provided education on services provided.  Intervention: No intervention at this time, pt declined to participate.  Evaluation: NA  Follow-up: If applicable, music therapy will follow up at at later time.    Education Documentation  No documentation found.          "

## 2024-05-07 NOTE — CONSULTS
"Referring Provider:  Dr. Tonny Cope  Date: 5/7/24    Reason For Consult: Depression      Chief Complaint: \"I couldn't breathe\"    History Of Present Illness  Abigail Aquino is a 79 y.o. year old female patient that presented to [facility] ED for a complaint of shortness of breath.  Patient has COPD and baseline O2 at home is 3L NC.  Psychiatry asked to see patient for depression.  During interview patient tearful at times when talking about her ex  who has prostate cancer with mets.  Patient reports that she is not depressed she just gets frustrated because she can't do the things she used to be able to due to her medical conditions.  Patient also reported she is not anxious all of the time, states she only gets anxious when she feels like she can't breathe.  Patient denies ever being diagnosed with anxiety.        Patient currently rates her depression at a 2/10.  Patient denies feeling depressed, she stated \"I don't get depressed, I get angry and upset\".  Patient denies all symptoms of depression, however was tearful and rated her depression a 2.  Patient denies any suicidal thoughts.  Patient reports experiencing prior depressive episodes, but not manic symptoms, in the past. No hallucinations or paranoia were endorsed or noted.     Patient reports experiencing excessive worries when she feels like she can't breathe.  States this makes her feel irritable and restless.      Per staff patient confused at times through out the night.  Patient stated she had a bad night and she felt like the room was flipped upside down.          PSYCHIATRIC REVIEW OF SYMPTOMS  Depressive Symptoms: depressed or irritable mood  Manic Symptoms: negative  Anxiety Symptoms: excessive worry Worry Symptoms: irritability due to worry  Psychotic Symptoms:  negative  Delirium/Altered Mental Status Symptoms: general medical condition present and symptoms fluctuate during course of day  Other Symptoms/Concerns:  " negative    Developmental Concerns:  negative  Disordered Eating Symptoms:  negative  Inattentive Symptoms:  negative  Hyperactive/Impulsive Symptoms:  negative  Conduct Issues:  negative      Past Medical History  Past Medical History:   Diagnosis Date    Clotting disorder (Multi)     COPD (chronic obstructive pulmonary disease) (Multi)     Eyebrow laceration, right, sequela 10/04/2023    Fracture tibia/fibula, right, sequela 10/11/2021    Hypertension     Kidney stones 2017    Solitary pulmonary nodule 2023        Past Psychiatric History: 1) Past Dx: depression                                            2) No prior psychiatric hospitalizations                                            3) No prior suicide attempts                                            4) No prior SIB                                            5) No prior rehab treatment programs                                            6) Current psych meds: lorazepam 0.5 mg PO Q6H PRN    Past Psychiatric Meds: 1) Prozac- was on it for 6 weeks, effective, patient doesn't think she needs it.                                                 Family History: 1) Denies                              2) No known suicides in the family.    Social History  Social History     Socioeconomic History    Marital status:      Spouse name: Not on file    Number of children: Not on file    Years of education: Not on file    Highest education level: Not on file   Occupational History    Not on file   Tobacco Use    Smoking status: Former     Current packs/day: 0.00     Types: Cigarettes     Quit date: 2023     Years since quittin.6     Passive exposure: Past    Smokeless tobacco: Never   Vaping Use    Vaping status: Never Used   Substance and Sexual Activity    Alcohol use: Never    Drug use: Never    Sexual activity: Not Currently   Other Topics Concern    Not on file   Social History Narrative    Not on file     Social Determinants of Health      Financial Resource Strain: Low Risk  (5/6/2024)    Overall Financial Resource Strain (CARDIA)     Difficulty of Paying Living Expenses: Not hard at all   Food Insecurity: Not on file   Transportation Needs: No Transportation Needs (5/6/2024)    PRAPARE - Transportation     Lack of Transportation (Medical): No     Lack of Transportation (Non-Medical): No   Physical Activity: Inactive (5/3/2024)    Exercise Vital Sign     Days of Exercise per Week: 0 days     Minutes of Exercise per Session: 0 min   Stress: Not on file   Social Connections: Not on file   Intimate Partner Violence: Not on file   Housing Stability: Low Risk  (5/6/2024)    Housing Stability Vital Sign     Unable to Pay for Housing in the Last Year: No     Number of Places Lived in the Last Year: 1     Unstable Housing in the Last Year: No        Substance Abuse History:  1) Tobacco - Former smoker, quit August 2023  2) ETOH - Denies   3) Cannabis - Denies  4) Denies any other illicit drug use.        The patient graduated high school.  Patient graduated college and studied Art and Art History. 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   once, currently .  2  children. No significant legal history. The patient lives alone in a mobile home.        OARRS Report reviewed on 5-7-2024.      Allergies  Allergies   Allergen Reactions    Atorvastatin Myalgia    Oxycodone Nausea/vomiting        Scheduled medications  apixaban, 5 mg, oral, BID  budesonide EC, 3 mg, oral, q AM  docusate sodium, 100 mg, oral, BID  doxycycline, 100 mg, intravenous, q12h  tiotropium, 2 puff, inhalation, Daily   And  fluticasone furoate-vilanteroL, 1 puff, inhalation, Daily  furosemide, 20 mg, oral, Daily  isosorbide mononitrate ER, 30 mg, oral, Daily  methylPREDNISolone sodium succinate (PF), 20 mg, intravenous, q12h  metoprolol tartrate, 50 mg, oral, BID  oxygen, , inhalation, Continuous - Inhalation  pravastatin, 40 mg, oral,  "Daily      Continuous medications     PRN medications  PRN medications: acetaminophen **OR** acetaminophen **OR** acetaminophen, acetaminophen **OR** acetaminophen **OR** acetaminophen, albuterol, guaiFENesin, LORazepam, ondansetron **OR** ondansetron         Review of Systems   Review of Systems   Constitutional: Negative.    HENT: Negative.     Eyes: Negative.    Respiratory:  Positive for cough and shortness of breath.    Cardiovascular: Negative.    Gastrointestinal: Negative.    Endocrine: Negative.    Genitourinary: Negative.    Musculoskeletal: Negative.    Skin: Negative.    Allergic/Immunologic: Negative.    Neurological:  Positive for weakness.   Hematological: Negative.    Psychiatric/Behavioral:  Positive for dysphoric mood. The patient is nervous/anxious.         Physical Exam  Mental Status Exam:   General: Appropriately groomed and dressed in hospital attire.   Appearance: Appears stated age.   Attitude: Calm, cooperative.   Behavior: Appropriate eye contact.   Motor Activity: No agitation or retardation. No EPS/TD.  Normal gait and station. Normal muscle tone and bulk.   Speech: Regular rate, rhythm, volume and tone, spontaneous,  fluent. Non-pressured.   Mood: \"frustrated\"   Affect: Neutral.   Thought Process: Organized.   Thought Content: Does not endorse suicidal ideation or any suicide plans.   Does not endorse homicidal ideation.  No overt delusions or paranoia elicited.    Thought Perception: Does not endorse auditory or  visual hallucinations, does not appear to be responding to hallucinatory stimuli.   Cognition: Alert, oriented x 3. No deficits noted.  Adequate fund of knowledge. No deficit in recent and remote memory. No deficits in attention, concentration or language.   Insight: Fair, as patient recognizes symptoms of  illness and need for recommended treatments.    Judgment: Intact, as patient can make reasonable decisions about ordinary activities of daily living and necessary medical " "care recommendations.       Last Recorded Vitals  Visit Vitals  BP (!) 174/115 (BP Location: Right arm, Patient Position: Sitting)   Pulse 76   Temp 36.9 °C (98.4 °F) (Temporal)   Resp 20        Relevant Results  Results for orders placed or performed during the hospital encounter of 05/02/24 (from the past 24 hour(s))   CBC   Result Value Ref Range    WBC 8.3 4.4 - 11.3 x10*3/uL    nRBC 0.0 0.0 - 0.0 /100 WBCs    RBC 4.35 4.00 - 5.20 x10*6/uL    Hemoglobin 13.6 12.0 - 16.0 g/dL    Hematocrit 43.9 36.0 - 46.0 %     (H) 80 - 100 fL    MCH 31.3 26.0 - 34.0 pg    MCHC 31.0 (L) 32.0 - 36.0 g/dL    RDW 13.2 11.5 - 14.5 %    Platelets 244 150 - 450 x10*3/uL   Basic Metabolic Panel   Result Value Ref Range    Glucose 138 (H) 74 - 99 mg/dL    Sodium 140 136 - 145 mmol/L    Potassium 3.5 3.5 - 5.3 mmol/L    Chloride 98 98 - 107 mmol/L    Bicarbonate 40 (HH) 21 - 32 mmol/L    Anion Gap <7 (L) 10 - 20 mmol/L    Urea Nitrogen 23 6 - 23 mg/dL    Creatinine 0.48 (L) 0.50 - 1.05 mg/dL    eGFR >90 >60 mL/min/1.73m*2    Calcium 8.9 8.6 - 10.3 mg/dL             The patient is judged a minimal suicide risk due to: 1) No access to guns, 2) Denies any prior suicide attempts, 3) Denies any current suicidal ideation or suicide plans, 4) +plans for the future: \"get back as healthy as I can and go see my ex  in [place]\" and 5) No current symptoms of Major Depressive Disorder.       Diagnostic Impression:  1) Other specified depressive disorder  2) Other specified anxiety disorder  3) Acute delirium  4) COPD exacerbation  5) Adenocarcinoma of the lung  6) Coronary artery disease  7) Hypertension      Recommendations:  Delirium Recommendations:  1) continue medical treatment  2) to prevent worsening mentation:       - MINIMIZE THE USE OF Benzodiazepines Anticholinergics, and Opiates        - AVOID RESTRAINTS as it can worsen agitation       - ATTEMPT TO SYNTHESIZE INFORMATION for patient and provide more frequent updates as      "         segmentation of information can further add to confusion  3) UTILIZE NON-PHARMACOLOGICAL INTERVENTIONS including:            - maintaining day/night distinction (blinds closed during night, open during day)           - minimizing excessive stimulation           - minimize staff/room change           - encourage interaction with familiar objects and frequent orientation, etc.           -ACCESS TO GLASSES / HEARING AIDS. Sensory deprivation can contribute to / worsen delirium    Delirium Guidelines  1) Non-Pharmacological Measures: Please ensure blinds are open during day to allow sufficient daylight to enter room; maintain dark/quiet room at night with minimal interruptions; minimize daytime naps and maintain sleep/wake cycle as able; if patient wears glasses or hearing aids, patient should have access to them as sensory deprivation can cause/worsen delirium; minimize room/staff changes; encourage interaction with familiar objects and frequent orientation.   2) Pharmacological Measures: Minimize use of benzodiazepines, anticholingeric medications and opiates (uncontrolled pain is also a risk factor for delirium) as these can all exacerbate delirium.     1) Changed ativan from Q6H to Q8H.  Ativan 0.5 mg PO Q8H PRN anxiety.  Recommend to not use benzo's, can worsen delirium.  Discussed with Dr. Cope, refer management of benzo's to [facility].      2) Patient would benefit from an SSRI for depression and anxiety.  Patient declined at this time.  Stated she doesn't think she needs one.        Will continue to follow.       I spent 90 minutes in the professional and overall care of this patient.        Brittney Pereyra, APRN, CNP, PMHNP

## 2024-05-08 PROCEDURE — 1200000002 HC GENERAL ROOM WITH TELEMETRY DAILY

## 2024-05-08 PROCEDURE — 2500000001 HC RX 250 WO HCPCS SELF ADMINISTERED DRUGS (ALT 637 FOR MEDICARE OP): Performed by: INTERNAL MEDICINE

## 2024-05-08 PROCEDURE — 2500000004 HC RX 250 GENERAL PHARMACY W/ HCPCS (ALT 636 FOR OP/ED): Performed by: INTERNAL MEDICINE

## 2024-05-08 PROCEDURE — 2500000005 HC RX 250 GENERAL PHARMACY W/O HCPCS: Performed by: INTERNAL MEDICINE

## 2024-05-08 PROCEDURE — 2500000004 HC RX 250 GENERAL PHARMACY W/ HCPCS (ALT 636 FOR OP/ED): Performed by: HOSPITALIST

## 2024-05-08 RX ORDER — GUAIFENESIN 600 MG/1
600 TABLET, EXTENDED RELEASE ORAL 2 TIMES DAILY
Status: DISCONTINUED | OUTPATIENT
Start: 2024-05-08 | End: 2024-05-09 | Stop reason: HOSPADM

## 2024-05-08 RX ORDER — DOXYCYCLINE 100 MG/1
100 CAPSULE ORAL EVERY 12 HOURS SCHEDULED
Status: DISCONTINUED | OUTPATIENT
Start: 2024-05-08 | End: 2024-05-09 | Stop reason: HOSPADM

## 2024-05-08 RX ADMIN — PRAVASTATIN SODIUM 40 MG: 40 TABLET ORAL at 20:54

## 2024-05-08 RX ADMIN — ALBUTEROL SULFATE 2 PUFF: 90 AEROSOL, METERED RESPIRATORY (INHALATION) at 13:11

## 2024-05-08 RX ADMIN — Medication 3 L/MIN: at 08:00

## 2024-05-08 RX ADMIN — DOXYCYCLINE 100 MG: 100 INJECTION, POWDER, LYOPHILIZED, FOR SOLUTION INTRAVENOUS at 08:32

## 2024-05-08 RX ADMIN — METOPROLOL TARTRATE 50 MG: 50 TABLET, FILM COATED ORAL at 20:51

## 2024-05-08 RX ADMIN — METHYLPREDNISOLONE SODIUM SUCCINATE 20 MG: 40 INJECTION, POWDER, FOR SOLUTION INTRAMUSCULAR; INTRAVENOUS at 20:50

## 2024-05-08 RX ADMIN — FUROSEMIDE 20 MG: 20 TABLET ORAL at 08:32

## 2024-05-08 RX ADMIN — APIXABAN 5 MG: 5 TABLET, FILM COATED ORAL at 08:32

## 2024-05-08 RX ADMIN — GUAIFENESIN 600 MG: 600 TABLET ORAL at 17:11

## 2024-05-08 RX ADMIN — TIOTROPIUM BROMIDE INHALATION SPRAY 2 PUFF: 3.12 SPRAY, METERED RESPIRATORY (INHALATION) at 08:32

## 2024-05-08 RX ADMIN — ALBUTEROL SULFATE 2 PUFF: 90 AEROSOL, METERED RESPIRATORY (INHALATION) at 17:12

## 2024-05-08 RX ADMIN — METOPROLOL TARTRATE 50 MG: 50 TABLET, FILM COATED ORAL at 08:32

## 2024-05-08 RX ADMIN — DOXYCYCLINE 100 MG: 100 CAPSULE ORAL at 20:50

## 2024-05-08 RX ADMIN — APIXABAN 5 MG: 5 TABLET, FILM COATED ORAL at 20:50

## 2024-05-08 RX ADMIN — Medication 3 L/MIN: at 20:00

## 2024-05-08 RX ADMIN — ISOSORBIDE MONONITRATE 30 MG: 30 TABLET, EXTENDED RELEASE ORAL at 08:32

## 2024-05-08 RX ADMIN — FLUTICASONE FUROATE AND VILANTEROL TRIFENATATE 1 PUFF: 200; 25 POWDER RESPIRATORY (INHALATION) at 08:32

## 2024-05-08 RX ADMIN — METHYLPREDNISOLONE SODIUM SUCCINATE 20 MG: 40 INJECTION, POWDER, FOR SOLUTION INTRAMUSCULAR; INTRAVENOUS at 08:32

## 2024-05-08 ASSESSMENT — COGNITIVE AND FUNCTIONAL STATUS - GENERAL
WALKING IN HOSPITAL ROOM: A LITTLE
MOBILITY SCORE: 22
DAILY ACTIVITIY SCORE: 23
TOILETING: A LITTLE
DAILY ACTIVITIY SCORE: 23
TOILETING: A LITTLE
WALKING IN HOSPITAL ROOM: A LITTLE
CLIMB 3 TO 5 STEPS WITH RAILING: A LITTLE
MOBILITY SCORE: 22
CLIMB 3 TO 5 STEPS WITH RAILING: A LITTLE

## 2024-05-08 ASSESSMENT — PAIN SCALES - GENERAL: PAINLEVEL_OUTOF10: 0 - NO PAIN

## 2024-05-08 NOTE — PROGRESS NOTES
"  Subjective   Abigail Aquino is a 79 y.o. year old female patient who was personally seen and interviewed.  The patient was interviewed alone at the end of the hallway (interviewed sitting in a chair), and was easily engaged and cooperative. This morning, patient reports feeling \"pissy\" and currently rates her depression at a 1 out of 10. No suicidal ideation or plans were elicited. She also rates her anxiety at a 1 out of 10. No hallucinations or paranoia were endorsed or noted. Patient reported she didn't sleep well last night.  Reports last night was better than the prior night. Patient continues to deny feeling depressed or wanting to take an antidepressant.          Objective   Mental Status Exam:   General: Appropriately groomed and dressed in casual attire.   Appearance: Appears stated age.   Attitude: Calm, cooperative.   Behavior: Appropriate eye contact.   Motor Activity: No agitation or retardation. No EPS/TD.  Normal gait and station. Normal muscle tone and bulk.   Speech: Regular rate, rhythm, volume and tone, spontaneous,  fluent. Non-pressured.   Mood: \"Pissy\"   Affect: Neutral.   Thought Process: Organized.   Thought Content: Does not endorse suicidal ideation or any suicide plans.   Does not endorse homicidal ideation.  No overt delusions or paranoia elicited.    Thought Perception: Does not endorse auditory or visual hallucinations, does not appear to be responding to hallucinatory stimuli.   Cognition: Alert, oriented x 4. No deficits noted.  Adequate fund of knowledge. No deficit in recent and remote memory. No deficits in attention, concentration or language.   Insight: Fair, as patient recognizes symptoms of  illness and need for recommended treatments.    Judgment: Intact, as patient can make reasonable decisions about ordinary activities of daily living and necessary medical care recommendations.       LABS:  No results found for this or any previous visit (from the past 24 hour(s)). " "    Last Recorded Vitals  Visit Vitals  /84 (BP Location: Left arm, Patient Position: Lying)   Pulse 84   Temp 36.4 °C (97.5 °F) (Temporal)   Resp 20        Intake/Output last 3 Shifts:  No intake/output data recorded.    Relevant Results  Scheduled medications  apixaban, 5 mg, oral, BID  budesonide EC, 3 mg, oral, q AM  docusate sodium, 100 mg, oral, BID  doxycycline, 100 mg, oral, q12h CARMEN  tiotropium, 2 puff, inhalation, Daily   And  fluticasone furoate-vilanteroL, 1 puff, inhalation, Daily  furosemide, 20 mg, oral, Daily  isosorbide mononitrate ER, 30 mg, oral, Daily  methylPREDNISolone sodium succinate (PF), 20 mg, intravenous, q12h  metoprolol tartrate, 50 mg, oral, BID  oxygen, , inhalation, Continuous - Inhalation  pravastatin, 40 mg, oral, Daily      Continuous medications     PRN medications  PRN medications: acetaminophen **OR** acetaminophen **OR** acetaminophen, acetaminophen **OR** acetaminophen **OR** acetaminophen, albuterol, guaiFENesin, ondansetron **OR** ondansetron         Diagnostic Impression:  1) Other specified depressive disorder  2) Other specified anxiety disorder  3) Acute delirium  4) COPD exacerbation  5) Adenocarcinoma of the lung  6) Coronary artery disease  7) Hypertension         The patient is judged a minimal suicide risk due to: 1) No access to guns, 2) Denies any prior suicide attempts, 3) Denies any current suicidal ideation or suicide plans, 4) +plans for the future: \"get back as healthy as I can and go see my ex  in [place]\" and 5) No current symptoms of Major Depressive Disorder.       Recommendations:  Delirium Recommendations:  1) continue medical treatment  2) to prevent worsening mentation:       - MINIMIZE THE USE OF Benzodiazepines Anticholinergics, and Opiates        - AVOID RESTRAINTS as it can worsen agitation       - ATTEMPT TO SYNTHESIZE INFORMATION for patient and provide more frequent updates as              segmentation of information can further " add to confusion  3) UTILIZE NON-PHARMACOLOGICAL INTERVENTIONS including:            - maintaining day/night distinction (blinds closed during night, open during day)           - minimizing excessive stimulation           - minimize staff/room change           - encourage interaction with familiar objects and frequent orientation, etc.           -ACCESS TO GLASSES / HEARING AIDS. Sensory deprivation can contribute to / worsen delirium     Delirium Guidelines  1) Non-Pharmacological Measures: Please ensure blinds are open during day to allow sufficient daylight to enter room; maintain dark/quiet room at night with minimal interruptions; minimize daytime naps and maintain sleep/wake cycle as able; if patient wears glasses or hearing aids, patient should have access to them as sensory deprivation can cause/worsen delirium; minimize room/staff changes; encourage interaction with familiar objects and frequent orientation.   2) Pharmacological Measures: Minimize use of benzodiazepines, anticholingeric medications and opiates (uncontrolled pain is also a risk factor for delirium) as these can all exacerbate delirium.        1) Ativan discontinued.  Patient reported she was glad and doesn't want to take that again, reports that's what made her confused.  2) Patient would benefit from an SSRI for depression and anxiety.  Patient declined at this time.  Stated she doesn't think she needs one.          Will continue to follow.           Brittney Pereyra, APRN, CNP, PMHNP

## 2024-05-08 NOTE — PROGRESS NOTES
Abigail Aquino is a 79 y.o. female on day 5 of admission presenting with COPD exacerbation (Multi).      Subjective   Patient is sitting on the bed, shortness of breath better compared to study.  Still complaining of some cough, minimal sputum production but  No fever chills or rigors but  No other significant overnight issues.    Objective     Last Recorded Vitals  /76 (BP Location: Left arm, Patient Position: Lying)   Pulse 58   Temp 36.8 °C (98.2 °F) (Temporal)   Resp 18   Wt 52.2 kg (115 lb)   SpO2 94%   Intake/Output last 3 Shifts:    Intake/Output Summary (Last 24 hours) at 5/8/2024 1652  Last data filed at 5/8/2024 0968  Gross per 24 hour   Intake 360 ml   Output --   Net 360 ml       Admission Weight  Weight: 58.1 kg (128 lb) (05/02/24 2005)    Daily Weight  05/03/24 : 52.2 kg (115 lb)    Image Results  ECG 12 Lead  Sinus rhythm  Atrial premature complex  Biatrial enlargement  Artifact in lead(s) I,II,III,aVR,aVL,aVF and baseline wander in lead(s) V3    See ED provider note for full interpretation and clinical correlation  Confirmed by Lydia Phelan ([id number]) on 5/4/2024 6:55:20 PM      Physical Exam  General: No distress.  Neck: Supple.  HEENT: Normocephalic atraumatic pupils equal react light.  Heart: S1-S2 heard no murmurs gallops regurgitation.  Lungs: Diminished breath sounds especially at the bases and also wheezing appreciated  Abdomen: Nondistended nontender bowel sounds are present.  Musculoskeletal: No deformities.  Extremities: No edema pulses are equal bilaterally.  Neuro: No focal deficits.      Relevant Results  Scheduled medications  apixaban, 5 mg, oral, BID  doxycycline, 100 mg, oral, q12h CARMEN  tiotropium, 2 puff, inhalation, Daily   And  fluticasone furoate-vilanteroL, 1 puff, inhalation, Daily  furosemide, 20 mg, oral, Daily  guaiFENesin, 600 mg, oral, BID  isosorbide mononitrate ER, 30 mg, oral, Daily  methylPREDNISolone sodium succinate (PF), 20 mg, intravenous,  q12h  metoprolol tartrate, 50 mg, oral, BID  oxygen, , inhalation, Continuous - Inhalation  pravastatin, 40 mg, oral, Daily      Continuous medications     PRN medications  PRN medications: acetaminophen **OR** acetaminophen **OR** acetaminophen, acetaminophen **OR** acetaminophen **OR** acetaminophen, albuterol, ondansetron **OR** ondansetron    No results found for this or any previous visit (from the past 24 hour(s)).  ECG 12 Lead    Result Date: 5/4/2024  Sinus rhythm Atrial premature complex Biatrial enlargement Artifact in lead(s) I,II,III,aVR,aVL,aVF and baseline wander in lead(s) V3 See ED provider note for full interpretation and clinical correlation Confirmed by Lydia Phelan ([id number]) on 5/4/2024 6:55:20 PM    CT angio chest for pulmonary embolism    Result Date: 5/2/2024  Interpreted By:  Froy Ayon, STUDY: CT ANGIO CHEST FOR PULMONARY EMBOLISM;  5/2/2024 11:12 pm   INDICATION: Signs/Symptoms:Shortness of breath, history of lung cancer in addition to prior PE.   COMPARISON: CT scan of the chest 12/28/2023   ACCESSION NUMBER(S): [id number]   ORDERING CLINICIAN: DAKSHA SALAS   TECHNIQUE: Helical data acquisition of the chest was obtained after intravenous administration of  75 mL of Omnipaque 350. Images were reformatted in axial, coronal, and sagittal planes. Axial and coronal MIPS were reconstructed and reviewed.   FINDINGS: HEART AND VESSELS: No acute pulmonary embolism to the  segmental arterial level.   Main pulmonary artery and its branches are normal in caliber.   The thoracic aorta is of normal course and caliber  with diffuse vascular calcifications.   Severe coronary artery calcifications are seen.The study is not optimized for evaluation of coronary arteries.   The cardiac chambers are not enlarged.   No evidence of pericardial effusion.   MEDIASTINUM AND SHELIA, LOWER NECK AND AXILLA: The visualized thyroid gland is within normal limits.   No evidence of thoracic lymphadenopathy by CT criteria.    Esophagus appears within normal limits as seen.   LUNGS AND AIRWAYS: The trachea and central airways are patent. No endobronchial lesion.   Respiratory motion artifact limits evaluation of the lung parenchyma. Centrilobular and paraseptal emphysema noted. There is redemonstration of a lobulated 1.3 x 2.9 cm lesion in the left lower lobe which is stable from prior CT. Additional 4-5 mm pulmonary nodules are noted 1 of which in the right lower lobe seen on axial image 114 of 358 appears to be new. No consolidation, effusion or pneumothorax. Expiratory phase of imaging.   UPPER ABDOMEN: Question subtle nodular contour of the liver. Subcentimeter hypodense focus in the left hepatic lobe is too small to characterize. There is a 2.3 cm hypodense lesion in the periphery of the right hepatic lobe demonstrating fluid attenuation suggestive of a cyst. Nodular thickening of the adrenal glands may relate to hyperplasia or adenomatous change.   CHEST WALL AND OSSEOUS STRUCTURES: Generalized diffuse osteopenia. There is moderate compression deformity of the L1 vertebral body with approximate 50% loss of vertebral body height and 5 mm retropulsion into the canal. Findings are stable from prior lumbar spine x-rays 04/11/2024. Multilevel degenerative changes are present.       1. No acute pulmonary embolism to the segmental arterial level. 2. Centrilobular and paraseptal emphysema noted. There is a 1.3 x 2.9 cm lobulated nodule in the left lower lobe consistent with patient's known history of malignancy. There several additional 4-5 mm nodules 1 of which appears to be new as described above. Attention on continued follow-up is advised. 3. Question subtle nodularity of the liver contour which can be seen in the setting of cirrhosis. Correlate with liver function tests. 4. Additional findings as noted above.   MACRO: None   Signed by: Froy Ayon 5/2/2024 11:46 PM Dictation workstation:   [id number]    XR chest 1 view    Result  Date: 5/2/2024  Interpreted By:  Rigoberto Terrazas, STUDY: XR CHEST 1 VIEW;  5/2/2024 8:06 pm   INDICATION: Signs/Symptoms:SOB.   COMPARISON: 02/09/2024   ACCESSION NUMBER(S): [id number]   ORDERING CLINICIAN: DAKSHA SALAS   FINDINGS:     The cardiomediastinal silhouette and pulmonary vasculature are within normal limits. There is redemonstration of a solid pulmonary nodule in the left lower lobe similar to prior study. No new airspace opacity. No pleural effusion. No consolidation, pleural effusion or pneumothorax.         No acute cardiopulmonary process.   Redemonstration of left lower lobe pulmonary malignancy. Overall no significant change in size radiographically from 02/09/2024. Continued oncological follow-up recommended.   MACRO: None.   Signed by: Rigoberto Terrazas 5/2/2024 8:31 PM Dictation workstation:   [id number]    XR lumbar spine complete 4+ views    Result Date: 4/12/2024  Interpreted By:  Lux Miramontes, STUDY: XR LUMBAR SPINE COMPLETE 4+ VIEWS; ;  4/11/2024 3:03 pm   INDICATION: Signs/Symptoms:compression fx L1 3/5/2024, pain persists.   COMPARISON: 03/05/2024   ACCESSION NUMBER(S): [id number]   ORDERING CLINICIAN: GUERLINE SMITH   FINDINGS: Lumbar spine, five views   There is dextrocurvature of the lumbar spine.   Redemonstration of compression fracture at L1 with further loss of height from the prior. No new fracture seen. There is no spondylolisthesis. No significant disc space narrowing seen. Mild facet disease lower lumbar spine. Vascular calcifications present.       Compression fracture at L1 with increased loss of height compared to the prior.     MACRO: None   Signed by: Lux Miramontes 4/12/2024 7:34 PM Dictation workstation:   [id number]       Assessment/Plan   Acute on chronic hypoxic respiratory failure.  Acute COPD exacerbation.  Depression/anxiety.    History of:  Coronary artery disease  COPD on 3 L nasal cannula at baseline.  Adenocarcinoma of left lung.  Remote  pulmonary embolism-on Eliquis.  Hypertension  Hyperlipidemia.    Plan:  Currently patient is on 5 L nasal cannula saturating well.  At baseline patient uses 3 L, continue to wean oxygen.  Continuing IV Solu-Medrol, will transition to tapering dose of prednisone in next 1 to 2 days.  Continue both scheduled and as needed breathing treatments are tolerating well pain  Started on Mucinex scheduled dosing.  Psychiatry evaluated the patient for her depression and anxiety, recommended to start SSRIs, will consider at the time of discharge.  Continue anticoagulation with Eliquis.  Home medications reviewed and resumed appropriately.  PT OT evaluation pain  Follow-up CBC BMP ordered pain    Disposition:  Possible discharge in next 1 to 2 days.  Still patient is on IV steroids, will transition to p.o. prednisone in next 1 to 2 days.        Gus Schrader MD

## 2024-05-08 NOTE — PROGRESS NOTES
05/08/24 1348   Discharge Planning   Living Arrangements Alone   Support Systems Friends/neighbors;Family members   Assistance Needed A&Ox3 ([place] independent with ADLs, no DME, room air at baseline   Type of Residence Private residence   Number of Stairs to Enter Residence 4   Number of Stairs Within Residence 0   Do you have animals or pets at home? No   Home or Post Acute Services None   Patient expects to be discharged to: Home, patient provided with private caregiver list   Does the patient need discharge transport arranged? No

## 2024-05-09 ENCOUNTER — PHARMACY VISIT (OUTPATIENT)
Dept: PHARMACY | Facility: CLINIC | Age: 79
End: 2024-05-09
Payer: COMMERCIAL

## 2024-05-09 VITALS
DIASTOLIC BLOOD PRESSURE: 84 MMHG | HEIGHT: 63 IN | RESPIRATION RATE: 18 BRPM | HEART RATE: 74 BPM | SYSTOLIC BLOOD PRESSURE: 154 MMHG | TEMPERATURE: 97.7 F | OXYGEN SATURATION: 95 % | WEIGHT: 115 LBS | BODY MASS INDEX: 20.38 KG/M2

## 2024-05-09 LAB
ANION GAP SERPL CALC-SCNC: 7 MMOL/L (ref 10–20)
BUN SERPL-MCNC: 22 MG/DL (ref 6–23)
CALCIUM SERPL-MCNC: 8.9 MG/DL (ref 8.6–10.3)
CHLORIDE SERPL-SCNC: 98 MMOL/L (ref 98–107)
CO2 SERPL-SCNC: 38 MMOL/L (ref 21–32)
CREAT SERPL-MCNC: 0.52 MG/DL (ref 0.5–1.05)
EGFRCR SERPLBLD CKD-EPI 2021: >90 ML/MIN/1.73M*2
ERYTHROCYTE [DISTWIDTH] IN BLOOD BY AUTOMATED COUNT: 13.2 % (ref 11.5–14.5)
GLUCOSE SERPL-MCNC: 159 MG/DL (ref 74–99)
HCT VFR BLD AUTO: 43.7 % (ref 36–46)
HGB BLD-MCNC: 14.4 G/DL (ref 12–16)
MCH RBC QN AUTO: 32.2 PG (ref 26–34)
MCHC RBC AUTO-ENTMCNC: 33 G/DL (ref 32–36)
MCV RBC AUTO: 98 FL (ref 80–100)
NRBC BLD-RTO: 0 /100 WBCS (ref 0–0)
PLATELET # BLD AUTO: 227 X10*3/UL (ref 150–450)
POTASSIUM SERPL-SCNC: 3.6 MMOL/L (ref 3.5–5.3)
RBC # BLD AUTO: 4.47 X10*6/UL (ref 4–5.2)
SODIUM SERPL-SCNC: 139 MMOL/L (ref 136–145)
WBC # BLD AUTO: 8 X10*3/UL (ref 4.4–11.3)

## 2024-05-09 PROCEDURE — 99239 HOSP IP/OBS DSCHRG MGMT >30: CPT | Performed by: INTERNAL MEDICINE

## 2024-05-09 PROCEDURE — 97112 NEUROMUSCULAR REEDUCATION: CPT | Mod: GP

## 2024-05-09 PROCEDURE — 2500000001 HC RX 250 WO HCPCS SELF ADMINISTERED DRUGS (ALT 637 FOR MEDICARE OP): Performed by: INTERNAL MEDICINE

## 2024-05-09 PROCEDURE — 85027 COMPLETE CBC AUTOMATED: CPT | Performed by: INTERNAL MEDICINE

## 2024-05-09 PROCEDURE — 82374 ASSAY BLOOD CARBON DIOXIDE: CPT | Performed by: INTERNAL MEDICINE

## 2024-05-09 PROCEDURE — 36415 COLL VENOUS BLD VENIPUNCTURE: CPT | Performed by: INTERNAL MEDICINE

## 2024-05-09 PROCEDURE — RXMED WILLOW AMBULATORY MEDICATION CHARGE

## 2024-05-09 PROCEDURE — 2500000004 HC RX 250 GENERAL PHARMACY W/ HCPCS (ALT 636 FOR OP/ED): Performed by: HOSPITALIST

## 2024-05-09 PROCEDURE — 2500000005 HC RX 250 GENERAL PHARMACY W/O HCPCS: Performed by: INTERNAL MEDICINE

## 2024-05-09 PROCEDURE — 97161 PT EVAL LOW COMPLEX 20 MIN: CPT | Mod: GP

## 2024-05-09 RX ORDER — DOXYCYCLINE 100 MG/1
100 CAPSULE ORAL EVERY 12 HOURS SCHEDULED
Qty: 10 CAPSULE | Refills: 0 | Status: SHIPPED | OUTPATIENT
Start: 2024-05-09 | End: 2024-05-14

## 2024-05-09 RX ORDER — GUAIFENESIN 600 MG/1
600 TABLET, EXTENDED RELEASE ORAL 2 TIMES DAILY
Qty: 20 TABLET | Refills: 0 | Status: SHIPPED | OUTPATIENT
Start: 2024-05-09 | End: 2024-05-19

## 2024-05-09 RX ADMIN — METHYLPREDNISOLONE SODIUM SUCCINATE 20 MG: 40 INJECTION, POWDER, FOR SOLUTION INTRAMUSCULAR; INTRAVENOUS at 08:35

## 2024-05-09 RX ADMIN — PRAVASTATIN SODIUM 40 MG: 40 TABLET ORAL at 08:36

## 2024-05-09 RX ADMIN — DOXYCYCLINE 100 MG: 100 CAPSULE ORAL at 08:36

## 2024-05-09 RX ADMIN — ISOSORBIDE MONONITRATE 30 MG: 30 TABLET, EXTENDED RELEASE ORAL at 08:36

## 2024-05-09 RX ADMIN — Medication 4 L/MIN: at 08:00

## 2024-05-09 RX ADMIN — TIOTROPIUM BROMIDE INHALATION SPRAY 2 PUFF: 3.12 SPRAY, METERED RESPIRATORY (INHALATION) at 06:11

## 2024-05-09 RX ADMIN — FUROSEMIDE 20 MG: 20 TABLET ORAL at 08:36

## 2024-05-09 RX ADMIN — METOPROLOL TARTRATE 50 MG: 50 TABLET, FILM COATED ORAL at 08:39

## 2024-05-09 RX ADMIN — APIXABAN 5 MG: 5 TABLET, FILM COATED ORAL at 08:36

## 2024-05-09 RX ADMIN — FLUTICASONE FUROATE AND VILANTEROL TRIFENATATE 1 PUFF: 200; 25 POWDER RESPIRATORY (INHALATION) at 06:09

## 2024-05-09 ASSESSMENT — PAIN SCALES - GENERAL
PAINLEVEL_OUTOF10: 0 - NO PAIN
PAINLEVEL_OUTOF10: 0 - NO PAIN

## 2024-05-09 ASSESSMENT — COGNITIVE AND FUNCTIONAL STATUS - GENERAL
CLIMB 3 TO 5 STEPS WITH RAILING: TOTAL
MOBILITY SCORE: 22
MOBILITY SCORE: 21
CLIMB 3 TO 5 STEPS WITH RAILING: A LITTLE
WALKING IN HOSPITAL ROOM: A LITTLE
PERSONAL GROOMING: A LITTLE
DAILY ACTIVITIY SCORE: 22
TOILETING: A LITTLE

## 2024-05-09 ASSESSMENT — ACTIVITIES OF DAILY LIVING (ADL): ADL_ASSISTANCE: INDEPENDENT

## 2024-05-09 ASSESSMENT — PAIN - FUNCTIONAL ASSESSMENT: PAIN_FUNCTIONAL_ASSESSMENT: 0-10

## 2024-05-09 NOTE — PROGRESS NOTES
Physical Therapy    Physical Therapy Evaluation    Patient Name: Abigail Aquino  MRN: [id number]  Today's Date: 5/9/2024   Time Calculation  Start Time: 1354  Stop Time: 1425  Time Calculation (min): 31 min    Assessment/Plan   PT Assessment  PT Assessment Results: Decreased endurance, Impaired balance, Decreased mobility  Rehab Prognosis: Excellent  Barriers to Discharge: Lives alone. Decreased recovery of balance.  Evaluation/Treatment Tolerance: Patient tolerated treatment well  Medical Staff Made Aware: Yes  End of Session Communication: Bedside nurse, Care Coordinator  Assessment Comment: Patient presents with COPD exacerbation though does not demonstrate any SOB with functional mobility this date. She would benefit from continued PT intervention for balance recovery strategies, thus low intensity rehab is recommended. Patient agreeable to use WW for mobility.  End of Session Patient Position: Up in chair, Alarm on  IP OR SWING BED PT PLAN  Inpatient or Swing Bed: Inpatient     05/09/24 1354   PT Plan   Treatment/Interventions Gait training;Stair training;Balance training;Endurance training;Therapeutic exercise;Therapeutic activity;Neuromuscular re-education   PT Plan Skilled PT   PT Frequency 3 times per week   PT Discharge Recommendations Low intensity level of continued care   Equipment Recommended upon Discharge Wheeled walker  (Patient has at home.)   PT Recommended Transfer Status Assist x1   PT - OK to Discharge Yes  (Once medically cleared.)     Subjective     Current Problem:  Patient Active Problem List   Diagnosis    Atherosclerotic heart disease of native coronary artery without angina pectoris    Chronic obstructive pulmonary disease, unspecified (Multi)    Difficulty in walking, not elsewhere classified    Mixed hyperlipidemia    Ulcerative colitis, unspecified, without complications (Multi)    Presence of stent in coronary artery    History of pulmonary embolism    Cigarette smoker    Lung  nodules    Lymphocytic colitis    Other acute pulmonary embolism, unspecified whether acute cor pulmonale present (Multi)    Primary cancer of left lower lobe of lung (Multi)    Nicotine dependence    Chronic respiratory failure with hypoxia (Multi)    Age-related physical debility    Long term (current) use of anticoagulants    Hypertensive heart disease with heart failure (Multi)    Pathological fracture due to osteoporosis    Compression fracture of lumbar vertebra (Multi)    COPD exacerbation (Multi)    Elevated hemoglobin A1c    Dyslipidemia    Hypertension       General Visit Information:  General  Reason for Referral: Decreased mobility.  Referred By: Gus WHAELY MD  Past Medical History Relevant to Rehab: 78 y/o female admitted with dx COPD exacerbation. Xray of chest demonstrated no acute changes. (PMHx: Compression deformity in vertebra 2 months ago. COPD. PE 4 months ago. CAD. Adenocarcinoma L lung dx 2/2024 - has been untreated. Pt reports 2021 R tibia fx with ORIF.)  Family/Caregiver Present: No  Prior to Session Communication: Bedside nurse  Patient Position Received: Up in chair, Alarm on  General Comment: Patient agreeable to participate in PT intervention, determined to return home today. Voices that she realizes it is irrational and illogical, but she feels that she will never leave until she dies if she doesn't go home today. She states that she feels as though she has no control while she is here at the hospital.    Home Living:  Home Living  Type of Home: House  Lives With: Alone  Home Adaptive Equipment: Walker rolling or standard (Only uses first couple of days post hospital stays per patient report.)  Home Layout: One level  Home Access: Stairs to enter with rails  Entrance Stairs-Rails: Both  Entrance Stairs-Number of Steps: 4  Bathroom Shower/Tub: Tub/shower unit  Bathroom Equipment: Grab bars in shower, Shower chair with back    Prior Level of Function:  Prior Function Per  Pt/Caregiver Report  Level of Coffee: Independent with ADLs and functional transfers, Independent with homemaking with ambulation  Receives Help From: Neighbor (Calls to check in on her daily if not visually seen.)  ADL Assistance: Independent  Homemaking Assistance: Independent  Ambulatory Assistance: Independent  Prior Function Comments: Manages long O2 tubing (3L baseline) without device during ambulation. +drives, manages finances and medications, gets groceries, completes meal prep    Precautions:  Precautions  Precautions Comment: Fall risk without assistive device. 4LO2. [place] with hearing aides.    Vital Signs:  Vital Signs  Heart Rate: 74 (After ambulation on 4LO2)  Heart Rate Source: Monitor  SpO2: 95 %  Patient Position: Sitting  Objective     Pain:  Pain Assessment  Pain Assessment: 0-10  Pain Score: 0 - No pain    Cognition:  Cognition  Overall Cognitive Status: Within Functional Limits  Orientation Level: Oriented X4    General Assessments:  General Observation  General Observation: Patient participated in high level dynamic stand balance activities without UE support including: stand with eyes closed without LOB 10 seconds; functional reach 4 inches; unable to recover from mild to moderate posterior perturbations - delayed response of stepping strategy requiring MIN A to correct. Educated pt on observed abilities and limitations, recommending use of WW upon homegoing and continued PT - pt voiced agreement.   Activity Tolerance  Endurance: Endurance does not limit participation in activity  Sensation  Light Touch: No apparent deficits  Strength  Strength Comments: B LE WNL  Perception  Inattention/Neglect: Appears intact  Coordination  Movements are Fluid and Coordinated: Yes     Static Sitting Balance  Static Sitting-Balance Support: No upper extremity supported  Static Sitting-Level of Assistance: Independent  Static Standing Balance  Static Standing-Balance Support: No upper extremity  supported  Static Standing-Level of Assistance: Close supervision    Functional Assessments:     Bed Mobility  Bed Mobility:  (Patient reports no difficulty with supine <> sit, requires no assistance.)  Transfers  Transfer:  (Sit <> stand MOD I)  Ambulation/Gait Training  Ambulation/Gait Training Performed:  (Ambulated 70 feet without device, SBA/S.)          Extremity/Trunk Assessments:        RLE   RLE : Within Functional Limits  LLE   LLE : Within Functional Limits    Outcome Measures:  [facility] Basic Mobility  Turning from your back to your side while in a flat bed without using bedrails: None  Moving from lying on your back to sitting on the side of a flat bed without using bedrails: None  Moving to and from bed to chair (including a wheelchair): None  Standing up from a chair using your arms (e.g. wheelchair or bedside chair): None  To walk in hospital room: None  Climbing 3-5 steps with railing: Total  Basic Mobility - Total Score: 21                            Goals:  Encounter Problems       Encounter Problems (Active)       Pain - Adult          To improve balance recovery strategies and independence upon homegoing:       Patient will demonstrate self-correction of loss of balance with use of balance recovery strategies including stepping  with mild to moderate force perturbation, no UE support with SBA.       Start:  05/09/24    Expected End:  05/23/24            Patient will ambulate with WW 50 feet with S/MOD I.       Start:  05/09/24    Expected End:  05/23/24                 Education Documentation  Mobility Training, taught by [facility] PT at 5/9/2024  2:53 PM.  Learner: Patient  Readiness: Acceptance  Method: Explanation  Response: Verbalizes Understanding    Education Comments  No comments found.      $LAUREN

## 2024-05-09 NOTE — CARE PLAN
"The patient's goals for the shift include      The clinical goals for the shift include Pt will have no s/s of SOB this shift    Over the shift, the patient did make progress toward the following goals. Pt denies any pain. Pt able to make needs known. This nurse had to talk to pt to get her to relax pt stating \"I just want to go home to my safe space\". This nurse was successful with getting pt to calm down. This nurse maintained pt safety this shift.    Problem: Fall/Injury  Goal: Not fall by end of shift  Outcome: Progressing  Goal: Be free from injury by end of the shift  Outcome: Progressing  Goal: Verbalize understanding of personal risk factors for fall in the hospital  Outcome: Progressing  Goal: Verbalize understanding of risk factor reduction measures to prevent injury from fall in the home  Outcome: Progressing  Goal: Use assistive devices by end of the shift  Outcome: Progressing  Goal: Pace activities to prevent fatigue by end of the shift  Outcome: Progressing     Problem: Safety  Goal: Patient will be injury free during hospitalization  Outcome: Progressing  Goal: I will remain free of falls  Outcome: Progressing     Problem: Chronic Conditions and Co-morbidities  Goal: Patient's chronic conditions and co-morbidity symptoms are monitored and maintained or improved  Outcome: Progressing     "

## 2024-05-09 NOTE — PROGRESS NOTES
05/09/24 1300   Discharge Planning   Patient expects to be discharged to: Home   Does the patient need discharge transport arranged? No     Followed up with pt via phone  due to working remote to confirm discharge plan. Pt plans to return home and declines further needs at this time. CT to follow. Lyndsay Hardwick BSN/RN-TCC

## 2024-05-09 NOTE — PROGRESS NOTES
"  Subjective   Abigail Aquino is a 79 y.o. year old female patient who was personally seen and interviewed.  The patient was interviewed alone in her room (interviewed sitting on bed), and was easily engaged and cooperative. This morning, patient reports feeling \"sad\" and currently rates her depression at a 0 out of 10.  Patient reports \"Im not depressed, I'm just sad\". No suicidal ideation or plans were elicited. She also rates her anxiety at a 0 out of 10. No hallucinations or paranoia were endorsed or noted. Patient reported she didn't sleep well again last night, offered patient melatonin tonight and she stated \"I don't take medications well unless I have too\".  Patient continues to deny wanting an antidepressant.        Objective   Mental Status Exam:   General: Appropriately groomed and dressed in casual attire.   Appearance: Appears stated age.   Attitude: Calm, cooperative.   Behavior: Appropriate eye contact.   Motor Activity: No agitation or retardation. No EPS/TD.  Normal gait and station. Normal muscle tone and bulk.   Speech: Regular rate, rhythm, volume and tone, spontaneous,  fluent. Non-pressured.   Mood: \"sad\"   Affect: Neutral.   Thought Process: Organized.   Thought Content: Does not endorse suicidal ideation or any suicide plans.   Does not endorse homicidal ideation.  No overt delusions or paranoia elicited.    Thought Perception: Does not endorse auditory or visual hallucinations, does not appear to be responding to hallucinatory stimuli.   Cognition: Alert, oriented x 4. No deficits noted.  Adequate fund of knowledge. No deficit in recent and remote memory. No deficits in attention, concentration or language.   Insight: Fair, as patient recognizes symptoms of  illness and need for recommended treatments.    Judgment: Intact, as patient can make reasonable decisions about ordinary activities of daily living and necessary medical care recommendations.       LABS:  Results for orders placed " or performed during the hospital encounter of 05/02/24 (from the past 24 hour(s))   CBC   Result Value Ref Range    WBC 8.0 4.4 - 11.3 x10*3/uL    nRBC 0.0 0.0 - 0.0 /100 WBCs    RBC 4.47 4.00 - 5.20 x10*6/uL    Hemoglobin 14.4 12.0 - 16.0 g/dL    Hematocrit 43.7 36.0 - 46.0 %    MCV 98 80 - 100 fL    MCH 32.2 26.0 - 34.0 pg    MCHC 33.0 32.0 - 36.0 g/dL    RDW 13.2 11.5 - 14.5 %    Platelets 227 150 - 450 x10*3/uL   Basic Metabolic Panel   Result Value Ref Range    Glucose 159 (H) 74 - 99 mg/dL    Sodium 139 136 - 145 mmol/L    Potassium 3.6 3.5 - 5.3 mmol/L    Chloride 98 98 - 107 mmol/L    Bicarbonate 38 (H) 21 - 32 mmol/L    Anion Gap 7 (L) 10 - 20 mmol/L    Urea Nitrogen 22 6 - 23 mg/dL    Creatinine 0.52 0.50 - 1.05 mg/dL    eGFR >90 >60 mL/min/1.73m*2    Calcium 8.9 8.6 - 10.3 mg/dL        Last Recorded Vitals  Visit Vitals  /84 (BP Location: Left arm, Patient Position: Lying)   Pulse 68   Temp 36.2 °C (97.2 °F) (Axillary)   Resp 20        Intake/Output last 3 Shifts:  No intake/output data recorded.    Relevant Results  Scheduled medications  apixaban, 5 mg, oral, BID  doxycycline, 100 mg, oral, q12h CARMEN  tiotropium, 2 puff, inhalation, Daily   And  fluticasone furoate-vilanteroL, 1 puff, inhalation, Daily  furosemide, 20 mg, oral, Daily  guaiFENesin, 600 mg, oral, BID  isosorbide mononitrate ER, 30 mg, oral, Daily  methylPREDNISolone sodium succinate (PF), 20 mg, intravenous, q12h  metoprolol tartrate, 50 mg, oral, BID  oxygen, , inhalation, Continuous - Inhalation  pravastatin, 40 mg, oral, Daily      Continuous medications     PRN medications  PRN medications: acetaminophen **OR** acetaminophen **OR** acetaminophen, acetaminophen **OR** acetaminophen **OR** acetaminophen, albuterol, ondansetron **OR** ondansetron         Diagnostic Impression:  1) Other specified depressive disorder  2) Other specified anxiety disorder  3) Acute delirium  4) COPD exacerbation  5) Adenocarcinoma of the lung  6)  "Coronary artery disease  7) Hypertension         The patient is judged a minimal suicide risk due to: 1) No access to guns, 2) Denies any prior suicide attempts, 3) Denies any current suicidal ideation or suicide plans, 4) +plans for the future: \"get back as healthy as I can and go see my ex  in [place]\" and 5) No current symptoms of Major Depressive Disorder.       Recommendations:  Delirium Recommendations:  1) continue medical treatment  2) to prevent worsening mentation:       - MINIMIZE THE USE OF Benzodiazepines Anticholinergics, and Opiates        - AVOID RESTRAINTS as it can worsen agitation       - ATTEMPT TO SYNTHESIZE INFORMATION for patient and provide more frequent updates as              segmentation of information can further add to confusion  3) UTILIZE NON-PHARMACOLOGICAL INTERVENTIONS including:            - maintaining day/night distinction (blinds closed during night, open during day)           - minimizing excessive stimulation           - minimize staff/room change           - encourage interaction with familiar objects and frequent orientation, etc.           -ACCESS TO GLASSES / HEARING AIDS. Sensory deprivation can contribute to / worsen delirium     Delirium Guidelines  1) Non-Pharmacological Measures: Please ensure blinds are open during day to allow sufficient daylight to enter room; maintain dark/quiet room at night with minimal interruptions; minimize daytime naps and maintain sleep/wake cycle as able; if patient wears glasses or hearing aids, patient should have access to them as sensory deprivation can cause/worsen delirium; minimize room/staff changes; encourage interaction with familiar objects and frequent orientation.   2) Pharmacological Measures: Minimize use of benzodiazepines, anticholingeric medications and opiates (uncontrolled pain is also a risk factor for delirium) as these can all exacerbate delirium.        1) Ativan discontinued.    2) Patient would benefit from " an SSRI for depression and anxiety.  Patient declined at this time.  Stated she doesn't think she needs one.  Patient also declining melatonin to help her sleep.     3) Patient can be discharged from a psychiatric standpoint.      Psychiatry will sign off.  Please message with any questions/concerns.        Brittney Pereyra APRN, CNP, PMHNP

## 2024-05-09 NOTE — NURSING NOTE
Patient discharged per physician order. Patient verbalized understanding of discharge instructions and copy of instructions sent with patient. IV taken out x1 per policy with catheter tip intact. Patient states friend is picking her up to safely transport her home. Medications brought via meds to beds.

## 2024-05-09 NOTE — CARE PLAN
The patient's goals for the shift include      The clinical goals for the shift include maintain patient safety  Problem: Fall/Injury  Goal: Not fall by end of shift  Outcome: Progressing  Goal: Be free from injury by end of the shift  Outcome: Progressing  Goal: Verbalize understanding of personal risk factors for fall in the hospital  Outcome: Progressing  Goal: Verbalize understanding of risk factor reduction measures to prevent injury from fall in the home  Outcome: Progressing  Goal: Use assistive devices by end of the shift  Outcome: Progressing  Goal: Pace activities to prevent fatigue by end of the shift  Outcome: Progressing     Problem: Pain  Goal: My pain/discomfort is manageable  Outcome: Progressing     Problem: Safety  Goal: Patient will be injury free during hospitalization  Outcome: Progressing  Goal: I will remain free of falls  Outcome: Progressing     Problem: Daily Care  Goal: Daily care needs are met  Outcome: Progressing     Problem: Psychosocial Needs  Goal: Demonstrates ability to cope with hospitalization/illness  Outcome: Progressing  Goal: Collaborate with me, my family, and caregiver to identify my specific goals  Outcome: Progressing     Problem: Discharge Barriers  Goal: My discharge needs are met  Outcome: Progressing     Problem: Pain - Adult  Goal: Verbalizes/displays adequate comfort level or baseline comfort level  Outcome: Progressing     Problem: Safety - Adult  Goal: Free from fall injury  Outcome: Progressing     Problem: Discharge Planning  Goal: Discharge to home or other facility with appropriate resources  Outcome: Progressing     Problem: Chronic Conditions and Co-morbidities  Goal: Patient's chronic conditions and co-morbidity symptoms are monitored and maintained or improved  Outcome: Progressing

## 2024-05-09 NOTE — PROGRESS NOTES
Occupational Therapy                 Therapy Communication Note    Patient Name: Abigail Aquino  MRN: [id number]  Today's Date: 5/9/2024     Discipline: Occupational Therapy     Missed Visit Reason: Other (Comment) Patient seen by P.T.   No O.T. needs at this time.  Discharge O.T.

## 2024-05-09 NOTE — DISCHARGE SUMMARY
Discharge Diagnosis  COPD exacerbation (Multi)  Acute on chronic hypoxic respiratory failure.  Acute COPD exacerbation.  Depression/anxiety.     History of:  Coronary artery disease  COPD on 3 L nasal cannula at baseline.  Adenocarcinoma of left lung.  Remote pulmonary embolism-on Eliquis.  Hypertension  Hyperlipidemia.    Issues Requiring Follow-Up  Follow up with PCP    Discharge Meds     Your medication list        START taking these medications        Instructions Last Dose Given Next Dose Due   azithromycin 250 mg tablet  Commonly known as: Zithromax      Take 1 tablet (250 mg) by mouth once daily for 4 days.       predniSONE 20 mg tablet  Commonly known as: Deltasone      Take 2 tablets (40 mg) by mouth once daily for 7 days.              ASK your doctor about these medications        Instructions Last Dose Given Next Dose Due   albuterol 90 mcg/actuation inhaler      INHALE 2 PUFFS BY MOUTH FOUR TIMES A DAY AS NEEDED FOR SHORTNESS OF BREATH       Breztri Aerosphere 160-9-4.8 mcg/actuation HFA aerosol inhaler  Generic drug: budesonide-glycopyr-formoterol      Inhale 2 puffs 2 times a day.       budesonide EC 3 mg 24 hr capsule  Commonly known as: Entocort EC           Eliquis 5 mg tablet  Generic drug: apixaban           fluticasone furoate-vilanteroL 200-25 mcg/dose inhaler  Commonly known as: Breo Ellipta           furosemide 20 mg tablet  Commonly known as: Lasix      TAKE 1 TABLET BY MOUTH ONCE DAILY       ipratropium-albuteroL 0.5-2.5 mg/3 mL nebulizer solution  Commonly known as: Duo-Neb      Take 3 mL by nebulization 4 times a day as needed for wheezing or shortness of breath.       isosorbide mononitrate ER 30 mg 24 hr tablet  Commonly known as: Imdur           lidocaine 5 % patch  Commonly known as: Lidoderm      Place 1 patch on the skin once daily. Leave on for 12 hours, remove for 12 hours       metoprolol tartrate 50 mg tablet  Commonly known as: Lopressor           pravastatin 40 mg  tablet  Commonly known as: Pravachol           tiotropium 2.5 mcg/actuation inhaler  Commonly known as: Spiriva Respimat                     Where to Get Your Medications        These medications were sent to [facility]/pharmacy #[id number] - [address] AT [address] OF [address] [address]      Phone: [phone]   azithromycin 250 mg tablet  predniSONE 20 mg tablet         Test Results Pending At Discharge  Pending Labs       No current pending labs.            Hospital Course  79 y.o. female with past medical history of O2 dependent COPD/emphysema, coronary artery disease, adenocarcinoma of the left lung, remote pulmonary emboli, hypertension, dyslipidemia presenting with increasing shortness of breath. Patient states that she has intermittent episodes where she just will desat into the 70s at home with minimal exertion. She is able to rest and recover. She states that the last day and a half she has noted wheezing and increasing shortness of breath and less endurance.   Currently patient is on 4 L nasal cannula saturating well.  At baseline patient uses 3 L, continue to wean oxygen.  Initially patient was not treated with IV Solu-Medrol, transition to oral prednisone for 7 more days to finish the course.  Resumed home COPD inhalers.  Given prescription for doxycycline for 5 more days, Mucinex for 10 days.  Psychiatry evaluated the patient, recommended to follow-up outpatient, and consider SSRIs-discussed with the patient, prefers to follow-up with primary care physician regarding starting antidepressants.  Resume Eliquis.  Home medications.  PT OT evaluated the patient fairly did okay without walker.  Discharging patient home in stable condition.      Pertinent Physical Exam At Time of Discharge  Physical Exam  General: No distress.  Neck: Supple.  HEENT: Normocephalic atraumatic pupils equal react light.  Heart: S1-S2 heard no murmurs gallops regurgitation.  Lungs: Wheezing  improved  Abdomen: Nondistended nontender bowel sounds are present.  Musculoskeletal: No deformities.  Extremities: No edema pulses are equal bilaterally.  Neuro: No focal deficits.  Outpatient Follow-Up  Future Appointments   Date Time Provider Department Center   7/11/2024  2:00 PM Vikki Marquez MD [id number] [facility] JOVANA Schrader MD

## 2024-05-09 NOTE — PROGRESS NOTES
SW provided pt with information on Private Duty Care giving, pt was appreciative and did not identify any other areas of support at this time. No other needs identified at this time, SW signing off,  pt and care team aware of SW availability while inpt.

## 2024-05-10 ENCOUNTER — PATIENT OUTREACH (OUTPATIENT)
Dept: CARE COORDINATION | Facility: CLINIC | Age: 79
End: 2024-05-10
Payer: COMMERCIAL

## 2024-05-10 NOTE — PROGRESS NOTES
Discharge Facility: [facility]  Discharge Diagnosis: COPD exacerbation (Multi)  Acute on chronic hypoxic respiratory failure.  Acute COPD exacerbation.  Depression/anxiety  Admission Date: 5/3/24  Discharge Date: 5/9/24    PCP Appointment Date: no appointments, message to office  Specialist Appointment Date: needs pulmonology, patient aware  Hospital Encounter and Summary: Linked  See discharge assessment below for further details    Engagement  Call Start Time: 1129 (5/10/2024 11:29 AM)    Medications  Medications reviewed with patient/caregiver?: Yes (5/10/2024 11:29 AM)  Is the patient having any side effects they believe may be caused by any medication additions or changes?: No (5/10/2024 11:29 AM)  Does the patient have all medications ordered at discharge?: Yes (5/10/2024 11:29 AM)  Medication Comments: new/changed medication reviewed. doxycycline, mucinex, duoneb and prednisone. (5/10/2024 11:29 AM)    Appointments  Does the patient have a primary care provider?: Yes (5/10/2024 11:29 AM)  Care Management Interventions: Advised patient to make appointment (5/10/2024 11:29 AM)  Care Management Interventions: Advised to schedule with specialist (5/10/2024 11:29 AM)    Self Management  What is the home health agency?: n/a (5/10/2024 11:29 AM)  What Durable Medical Equipment (DME) was ordered?: n/a (on baseline oxygen, has increased from 3LPM to 3.5LPM since hospital discharge) (5/10/2024 11:29 AM)    Patient Teaching  Care Management Interventions: Reviewed instructions with patient (5/10/2024 11:29 AM)  What is the patient's perception of their health status since discharge?: Improving (5/10/2024 11:29 AM)  Patient/Caregiver Education Comments: Patient reports that breathing is better but she is very tired from lack of sleep in the hospital. She also mentions feeling overwhelmed from hospital stay and follow ups and that she just needs to get back into the swing of things. Attempted to provide support. Offered  contact information, patient declined stating she prefers to call into the office. (5/10/2024 11:29 AM)

## 2024-05-13 ENCOUNTER — TELEPHONE (OUTPATIENT)
Dept: PRIMARY CARE | Facility: CLINIC | Age: 79
End: 2024-05-13
Payer: COMMERCIAL

## 2024-05-13 NOTE — TELEPHONE ENCOUNTER
Patient does not want to schedule a Hospital discharge appointment. She just wants to keep her July appointment

## 2024-05-15 NOTE — DOCUMENTATION CLARIFICATION NOTE
"    PATIENT:               NILA CORTÉS  ACCT #:                  [id number]  MRN:                       [id number]  :                       1945  ADMIT DATE:       2024 7:07 PM  DISCH DATE:        2024 4:41 PM  RESPONDING PROVIDER #:        [id number]          PROVIDER RESPONSE TEXT:    Toxic encephalopathy 2/2 Ativan use, now resolving/resolved    CDI QUERY TEXT:    Clarification    Instruction:    Based on your assessment of the patient and the clinical information, please provide the requested documentation by clicking on the appropriate radio button and enter any additional information if prompted.    Question: Please further clarify the most likely etiology of the altered mental status as    When answering this query, please exercise your independent professional judgment. The fact that a question is being asked, does not imply that any particular answer is desired or expected.    The patient's clinical indicators include:  Clinical Information: Patient with noted confusion    Clinical Indicators: Per psych consult, \"Per staff patient confused at times through out the night. Patient stated she had a bad night and she felt like the room was flipped upside down.\"    Per  psych PN, \"Alert, oriented x 4. No deficits noted...Ativan discontinued. Patient reported she was glad and doesn't want to take that again, reports that's what made her confused....Acute delirium.\"    Treatment: psych consult, discontinuing ativan, monitoring mental status    Risk Factors: 79yof with noted confusion  Options provided:  -- Toxic encephalopathy 2/2 Ativan use, now resolving/resolved  -- Delirium only  -- Other - I will add my own diagnosis  -- Refer to Clinical Documentation Reviewer    Query created by: Katina Paula on 2024 3:03 PM      Electronically signed by:  DARION TERRAZAS DO 2024 8:31 PM          "

## 2024-05-23 ENCOUNTER — PATIENT OUTREACH (OUTPATIENT)
Dept: CARE COORDINATION | Facility: CLINIC | Age: 79
End: 2024-05-23
Payer: COMMERCIAL

## 2024-05-23 NOTE — PROGRESS NOTES
Follow up call after hospitalization. Patient did not have PCP appointment within 14 days of discharfe, she ended up preferring to keep previously scheduled July appointment.  At time of outreach call the patient feels as if their condition has (improved) since last visit.  Reviewed the PCP appointment with the pt and addressed any questions or concerns.

## 2024-05-28 ENCOUNTER — TELEPHONE (OUTPATIENT)
Dept: PULMONOLOGY | Facility: HOSPITAL | Age: 79
End: 2024-05-28
Payer: COMMERCIAL

## 2024-05-28 ENCOUNTER — TELEPHONE (OUTPATIENT)
Dept: HEMATOLOGY/ONCOLOGY | Facility: CLINIC | Age: 79
End: 2024-05-28
Payer: COMMERCIAL

## 2024-05-28 NOTE — TELEPHONE ENCOUNTER
Patient called in and left a voicemail requesting a bronchoscopy order be placed. I called patient back and let her know she needed to reestablish with Dr. Castañeda and get ahold of their office as well as Dr. Wooten's for future orders. Advised her to call us back with any further questions or concerns. Patient was agreeable to treatment plan and acknowledged understanding.

## 2024-06-05 ENCOUNTER — SOCIAL WORK (OUTPATIENT)
Dept: HEMATOLOGY/ONCOLOGY | Facility: CLINIC | Age: 79
End: 2024-06-05
Payer: COMMERCIAL

## 2024-06-05 ENCOUNTER — HOSPITAL ENCOUNTER (OUTPATIENT)
Dept: RADIATION ONCOLOGY | Facility: CLINIC | Age: 79
Setting detail: RADIATION/ONCOLOGY SERIES
Discharge: HOME | End: 2024-06-05
Payer: COMMERCIAL

## 2024-06-05 VITALS
TEMPERATURE: 97 F | DIASTOLIC BLOOD PRESSURE: 77 MMHG | WEIGHT: 120 LBS | RESPIRATION RATE: 18 BRPM | BODY MASS INDEX: 21.26 KG/M2 | SYSTOLIC BLOOD PRESSURE: 121 MMHG | HEART RATE: 65 BPM | OXYGEN SATURATION: 96 %

## 2024-06-05 DIAGNOSIS — C34.32 PRIMARY CANCER OF LEFT LOWER LOBE OF LUNG (MULTI): Primary | ICD-10-CM

## 2024-06-05 DIAGNOSIS — R91.8 LUNG NODULES: ICD-10-CM

## 2024-06-05 DIAGNOSIS — F40.240 CLAUSTROPHOBIA: ICD-10-CM

## 2024-06-05 PROCEDURE — 99215 OFFICE O/P EST HI 40 MIN: CPT | Performed by: STUDENT IN AN ORGANIZED HEALTH CARE EDUCATION/TRAINING PROGRAM

## 2024-06-05 PROCEDURE — 99417 PROLNG OP E/M EACH 15 MIN: CPT | Performed by: STUDENT IN AN ORGANIZED HEALTH CARE EDUCATION/TRAINING PROGRAM

## 2024-06-05 RX ORDER — LORAZEPAM 0.5 MG/1
0.5 TABLET ORAL ONCE
Qty: 2 TABLET | Refills: 0 | Status: SHIPPED | OUTPATIENT
Start: 2024-06-05 | End: 2024-06-05

## 2024-06-05 ASSESSMENT — PATIENT HEALTH QUESTIONNAIRE - PHQ9
SUM OF ALL RESPONSES TO PHQ9 QUESTIONS 1 & 2: 2
2. FEELING DOWN, DEPRESSED OR HOPELESS: SEVERAL DAYS
1. LITTLE INTEREST OR PLEASURE IN DOING THINGS: SEVERAL DAYS
10. IF YOU CHECKED OFF ANY PROBLEMS, HOW DIFFICULT HAVE THESE PROBLEMS MADE IT FOR YOU TO DO YOUR WORK, TAKE CARE OF THINGS AT HOME, OR GET ALONG WITH OTHER PEOPLE: SOMEWHAT DIFFICULT

## 2024-06-05 ASSESSMENT — COLUMBIA-SUICIDE SEVERITY RATING SCALE - C-SSRS
2. HAVE YOU ACTUALLY HAD ANY THOUGHTS OF KILLING YOURSELF?: NO
6. HAVE YOU EVER DONE ANYTHING, STARTED TO DO ANYTHING, OR PREPARED TO DO ANYTHING TO END YOUR LIFE?: NO
1. IN THE PAST MONTH, HAVE YOU WISHED YOU WERE DEAD OR WISHED YOU COULD GO TO SLEEP AND NOT WAKE UP?: NO

## 2024-06-05 ASSESSMENT — ENCOUNTER SYMPTOMS
OCCASIONAL FEELINGS OF UNSTEADINESS: 1
LOSS OF SENSATION IN FEET: 0
DEPRESSION: 0

## 2024-06-05 ASSESSMENT — PAIN SCALES - GENERAL: PAINLEVEL: 0-NO PAIN

## 2024-06-05 NOTE — PROGRESS NOTES
Radiation Oncology Follow-Up    Patient Name:  Abigail Aquino  MRN:  [id number]  :  1945    Referring Provider: No ref. provider found  Primary Care Provider: Vikki Marquez MD  Care Team: Patient Care Team:  Vikki Marquez MD as PCP - General  Emily Castañeda MD as Medical Oncologist (Hematology and Oncology)  Wesley Hernandez MD as Surgeon (Pulmonary Disease)  Donny Arizmendi DO as Surgeon (Gastroenterology)  Clif WHALEY MD as Radiation Oncologist (Radiation Oncology)  Patricia Jewell RN as Care Manager (Case Management)    Date of Service: 2024    SUBJECTIVE  History of Present Illness:  Abigail Aquino is a 79 y.o. 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     After her last visit with me, she had been scheduled for bronchoscopy/EBUS for mediastinal staging, but she canceled this appointment. She also canceled her CT simulation appointment for SBRT planning. At the time she felt that her breathing was too poor to go through with further workup and treatment.    She was hospitalized 24 to 24 and 24 and 24 for COPD exacerbation. CT angio chest on 24 was limited by motion artifact, but the LLL adenocarcinoma nodule appeared similar in size compared to her PET/CT from 24 (about 2.9 cm). There were additional 4-5 mm nodules in the RLL, one of which appeared new. There was no thoracic adenopathy noted.     Since her last hospitalization, she has been at home but still feels her breathing has been poor overall. She remains on 3L NC oxygen at all times and needs help from her neighbor with some chores. She denies chest pain, productive cough, or hemoptysis. She is extremely anxious about going through CT simulation and radiation treatments because of her severe claustrophobia.    Treatment Rendered:   No radiation treatments  to show. (Treatments may have been administered in another system.)       Review of Systems:   Review of Systems   All other systems reviewed and are negative.      Performance Status:   The Karnofsky performance scale today is 60, Requires occasional assistance, but is able to care for most of her personal needs (ECOG equivalent 2).       OBJECTIVE  Vital Signs:  /77 (BP Location: Left arm, Patient Position: Sitting, BP Cuff Size: Adult)   Pulse 65   Temp 36.1 °C (97 °F) (Temporal)   Resp 18   Wt 54.4 kg (120 lb) Comment: stated  SpO2 96% Comment: 4 liters oxygen  BMI 21.26 kg/m²   Physical Exam  Constitutional:       General: She is in acute distress.      Appearance: Normal appearance. She is ill-appearing. She is not toxic-appearing.   HENT:      Head: Normocephalic and atraumatic.      Mouth/Throat:      Mouth: Mucous membranes are moist.      Pharynx: Oropharynx is clear. No oropharyngeal exudate or posterior oropharyngeal erythema.   Eyes:      General: No scleral icterus.     Extraocular Movements: Extraocular movements intact.      Conjunctiva/sclera: Conjunctivae normal.      Pupils: Pupils are equal, round, and reactive to light.   Cardiovascular:      Rate and Rhythm: Normal rate and regular rhythm.      Heart sounds: No murmur heard.     No friction rub. No gallop.   Pulmonary:      Effort: Pulmonary effort is normal. No respiratory distress.      Breath sounds: No stridor. Wheezing present. No rhonchi or rales.      Comments: Mild wheezing throughout bilateral lungs. On 3L NC oxygen.  Chest:      Chest wall: No tenderness.   Musculoskeletal:         General: Normal range of motion.      Cervical back: Normal range of motion and neck supple.      Right lower leg: No edema.      Left lower leg: No edema.   Skin:     General: Skin is warm and dry.      Coloration: Skin is not jaundiced.      Findings: No lesion or rash.   Neurological:      General: No focal deficit present.      Mental  Status: She is alert and oriented to person, place, and time.      Cranial Nerves: No cranial nerve deficit.      Sensory: No sensory deficit.      Motor: No weakness.      Coordination: Coordination normal.      Gait: Gait normal.   Psychiatric:         Mood and Affect: Mood normal.         Behavior: Behavior normal.      Comments: Anxious, tearful            ASSESSMENT:   Abigail Aquino is a 79 y.o. 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 She is medically inoperable with severe COPD (3L NC oxygen at baseline, FEV1 31%). She has fair performance status (ECOG 2) but lives independently.     She was previously scheduled for staging bronchoscopy/EBUS on 2/26/24 and was being set up for SBRT CT simulation, but she canceled these appointments because she felt her breathing was too poor and that her COPD was the larger concern. She was hospitalized in 2/2024 and 5/2024 with COPD exacerbation. Her last chest imaging with CT angio chest on 5/2/24 did show stable size of the LLL adenocarcinoma with no new thoracic adenopathy.    She is extremely anxious about undergoing CT simulation and radiation treatments, but she is willing to try with anti-anxiety medication. I will prescribe low dose PO Ativan to take prior to CT simulation. I will also refer her back to interventional pulmonology for staging bronchoscopy/EBUS. She previously gave informed consent for lung SBRT.    NCCN Guidelines were applicable to guide this patients treatment plan.    Clif Wooten MD

## 2024-06-05 NOTE — PROGRESS NOTES
Patient had a follow up appointment with Dr. Wooten.   (SW) spoke with patient back in February and she decided to not pursue treatment.   SW met with patient today for introduction, to assess needs, and offer support.  Patient was A&Ox3 with appropriate and congruent mood and affect.  Patient discussed that she has a lot of anxiety about the testing.  Patient stated that Dr. Wooten did prescribe her medication to help with her anxiety.  SW asked about her transportation.  Patient stated that she does drive herself but not to [place].  Patient stated that her insurance does have a benefit so she is going to use it.  Patient discussed her how she has been feeling and her hospital admissions.  Patient stated that she has two children who reside out of state.  SW provided patient with SW information and encourage patient to call if she would need anything.  SW wished patient the best on her testing.     Johnathan Jackson MSW, LSW

## 2024-06-05 NOTE — PROGRESS NOTES
Bronchoscopy Scheduling Request    Pre-bronchoscopy visit: New patient visit with Bronchoscopy group provider  Please schedule procedure: ASAP    Cytology on-site:  Yes  Location:  Either location- patient prefers [facility]  Performing physician:  Advanced diagnostic bronchoscopist  Referring physician:  MD Clif Sutton MD, Vikki Marquez MD  Indication:  LLL NSCLC, needs staging EBUS  Sedation / Anesthesia:  GA  Procedure:  Staging EBUS  Time:  Tier 2  Fluorscopy:   No  Imaging needed:  None  Labs:  CBC, BMP- done 5/7/2024.  Bicarb > 40.   Meds:  Eliquis  Special Considerations:  PATfor GA clearance (O2 needs, recently admitted, on eliquis, etc); patient on chronic O2 (should bring with her, if able)   Reviewed by:  Evangelina Martinez MD

## 2024-06-12 ENCOUNTER — APPOINTMENT (OUTPATIENT)
Dept: GASTROENTEROLOGY | Facility: CLINIC | Age: 79
End: 2024-06-12
Payer: COMMERCIAL

## 2024-06-14 ENCOUNTER — TELEPHONE (OUTPATIENT)
Dept: PRIMARY CARE | Facility: CLINIC | Age: 79
End: 2024-06-14
Payer: COMMERCIAL

## 2024-06-14 DIAGNOSIS — J43.9 PULMONARY EMPHYSEMA, UNSPECIFIED EMPHYSEMA TYPE (MULTI): Primary | ICD-10-CM

## 2024-06-14 NOTE — TELEPHONE ENCOUNTER
Rx Refill Request Telephone Encounter    Name:  Abigail Aquino  :  [id number]  Medication Name:      This med is normally filled by pulmonology, patient called it in for them to refill yesterday but they did not send it over and patient will be without this weekend. Is there a way we could send over a temp script?     budesonide-glycopyr-formoterol (Breztri Aerosphere) 160-9-4.8 mcg/actuation HFA aerosol inhaler   Sig: Inhale 2 puffs 2 times a day.      Specific Pharmacy location:  [facility]/pharmacy #[id number] - [address] AT [facility]   Date of last appointment:  24  Date of next appointment:  24  Best number to reach patient: [phone]

## 2024-06-16 ENCOUNTER — APPOINTMENT (OUTPATIENT)
Dept: RADIOLOGY | Facility: HOSPITAL | Age: 79
End: 2024-06-16
Payer: COMMERCIAL

## 2024-06-16 ENCOUNTER — HOSPITAL ENCOUNTER (INPATIENT)
Facility: HOSPITAL | Age: 79
LOS: 3 days | Discharge: HOSPICE | End: 2024-06-20
Attending: STUDENT IN AN ORGANIZED HEALTH CARE EDUCATION/TRAINING PROGRAM | Admitting: STUDENT IN AN ORGANIZED HEALTH CARE EDUCATION/TRAINING PROGRAM
Payer: COMMERCIAL

## 2024-06-16 ENCOUNTER — APPOINTMENT (OUTPATIENT)
Dept: CARDIOLOGY | Facility: HOSPITAL | Age: 79
End: 2024-06-16
Payer: COMMERCIAL

## 2024-06-16 DIAGNOSIS — J44.9 CHRONIC OBSTRUCTIVE PULMONARY DISEASE, UNSPECIFIED COPD TYPE (MULTI): ICD-10-CM

## 2024-06-16 DIAGNOSIS — Z51.5 HOSPICE CARE: ICD-10-CM

## 2024-06-16 DIAGNOSIS — J44.1 COPD EXACERBATION (MULTI): ICD-10-CM

## 2024-06-16 DIAGNOSIS — J44.1 COPD WITH ACUTE EXACERBATION (MULTI): Primary | ICD-10-CM

## 2024-06-16 LAB
ANION GAP BLDV CALCULATED.4IONS-SCNC: 2 MMOL/L (ref 10–25)
BASE EXCESS BLDV CALC-SCNC: 12.7 MMOL/L (ref -2–3)
BASOPHILS # BLD AUTO: 0.08 X10*3/UL (ref 0–0.1)
BASOPHILS NFR BLD AUTO: 0.8 %
BODY TEMPERATURE: 37 DEGREES CELSIUS
CA-I BLDV-SCNC: 1.18 MMOL/L (ref 1.1–1.33)
CHLORIDE BLDV-SCNC: 98 MMOL/L (ref 98–107)
EOSINOPHIL # BLD AUTO: 0.35 X10*3/UL (ref 0–0.4)
EOSINOPHIL NFR BLD AUTO: 3.4 %
ERYTHROCYTE [DISTWIDTH] IN BLOOD BY AUTOMATED COUNT: 12.8 % (ref 11.5–14.5)
GLUCOSE BLDV-MCNC: 122 MG/DL (ref 74–99)
HCO3 BLDV-SCNC: 41.8 MMOL/L (ref 22–26)
HCT VFR BLD AUTO: 44 % (ref 36–46)
HCT VFR BLD EST: 43 % (ref 36–46)
HGB BLD-MCNC: 14.3 G/DL (ref 12–16)
HGB BLDV-MCNC: 14.4 G/DL (ref 12–16)
IMM GRANULOCYTES # BLD AUTO: 0.06 X10*3/UL (ref 0–0.5)
IMM GRANULOCYTES NFR BLD AUTO: 0.6 % (ref 0–0.9)
INHALED O2 CONCENTRATION: 40 %
LACTATE BLDV-SCNC: 1 MMOL/L (ref 0.4–2)
LYMPHOCYTES # BLD AUTO: 1.24 X10*3/UL (ref 0.8–3)
LYMPHOCYTES NFR BLD AUTO: 12 %
MCH RBC QN AUTO: 31.3 PG (ref 26–34)
MCHC RBC AUTO-ENTMCNC: 32.5 G/DL (ref 32–36)
MCV RBC AUTO: 96 FL (ref 80–100)
MONOCYTES # BLD AUTO: 1.07 X10*3/UL (ref 0.05–0.8)
MONOCYTES NFR BLD AUTO: 10.4 %
NEUTROPHILS # BLD AUTO: 7.52 X10*3/UL (ref 1.6–5.5)
NEUTROPHILS NFR BLD AUTO: 72.8 %
NRBC BLD-RTO: 0 /100 WBCS (ref 0–0)
OXYHGB MFR BLDV: 53.6 % (ref 45–75)
PCO2 BLDV: 74 MM HG (ref 41–51)
PH BLDV: 7.36 PH (ref 7.33–7.43)
PLATELET # BLD AUTO: 247 X10*3/UL (ref 150–450)
PO2 BLDV: 33 MM HG (ref 35–45)
POTASSIUM BLDV-SCNC: 3.2 MMOL/L (ref 3.5–5.3)
RBC # BLD AUTO: 4.57 X10*6/UL (ref 4–5.2)
SAO2 % BLDV: 55 % (ref 45–75)
SODIUM BLDV-SCNC: 139 MMOL/L (ref 136–145)
WBC # BLD AUTO: 10.3 X10*3/UL (ref 4.4–11.3)

## 2024-06-16 PROCEDURE — 84132 ASSAY OF SERUM POTASSIUM: CPT | Performed by: STUDENT IN AN ORGANIZED HEALTH CARE EDUCATION/TRAINING PROGRAM

## 2024-06-16 PROCEDURE — 94640 AIRWAY INHALATION TREATMENT: CPT

## 2024-06-16 PROCEDURE — 84484 ASSAY OF TROPONIN QUANT: CPT | Performed by: STUDENT IN AN ORGANIZED HEALTH CARE EDUCATION/TRAINING PROGRAM

## 2024-06-16 PROCEDURE — 2500000004 HC RX 250 GENERAL PHARMACY W/ HCPCS (ALT 636 FOR OP/ED): Performed by: STUDENT IN AN ORGANIZED HEALTH CARE EDUCATION/TRAINING PROGRAM

## 2024-06-16 PROCEDURE — 96375 TX/PRO/DX INJ NEW DRUG ADDON: CPT

## 2024-06-16 PROCEDURE — 85730 THROMBOPLASTIN TIME PARTIAL: CPT | Performed by: STUDENT IN AN ORGANIZED HEALTH CARE EDUCATION/TRAINING PROGRAM

## 2024-06-16 PROCEDURE — 71045 X-RAY EXAM CHEST 1 VIEW: CPT

## 2024-06-16 PROCEDURE — 71045 X-RAY EXAM CHEST 1 VIEW: CPT | Performed by: SURGERY

## 2024-06-16 PROCEDURE — 85610 PROTHROMBIN TIME: CPT | Performed by: STUDENT IN AN ORGANIZED HEALTH CARE EDUCATION/TRAINING PROGRAM

## 2024-06-16 PROCEDURE — 93005 ELECTROCARDIOGRAM TRACING: CPT

## 2024-06-16 PROCEDURE — 99285 EMERGENCY DEPT VISIT HI MDM: CPT | Mod: 25

## 2024-06-16 PROCEDURE — 85025 COMPLETE CBC W/AUTO DIFF WBC: CPT | Performed by: STUDENT IN AN ORGANIZED HEALTH CARE EDUCATION/TRAINING PROGRAM

## 2024-06-16 PROCEDURE — 36415 COLL VENOUS BLD VENIPUNCTURE: CPT | Performed by: STUDENT IN AN ORGANIZED HEALTH CARE EDUCATION/TRAINING PROGRAM

## 2024-06-16 PROCEDURE — 96374 THER/PROPH/DIAG INJ IV PUSH: CPT

## 2024-06-16 PROCEDURE — 83880 ASSAY OF NATRIURETIC PEPTIDE: CPT | Performed by: STUDENT IN AN ORGANIZED HEALTH CARE EDUCATION/TRAINING PROGRAM

## 2024-06-16 PROCEDURE — 83605 ASSAY OF LACTIC ACID: CPT | Mod: 91 | Performed by: STUDENT IN AN ORGANIZED HEALTH CARE EDUCATION/TRAINING PROGRAM

## 2024-06-16 PROCEDURE — 2500000002 HC RX 250 W HCPCS SELF ADMINISTERED DRUGS (ALT 637 FOR MEDICARE OP, ALT 636 FOR OP/ED): Performed by: STUDENT IN AN ORGANIZED HEALTH CARE EDUCATION/TRAINING PROGRAM

## 2024-06-16 RX ORDER — IPRATROPIUM BROMIDE AND ALBUTEROL SULFATE 2.5; .5 MG/3ML; MG/3ML
3 SOLUTION RESPIRATORY (INHALATION)
Status: COMPLETED | OUTPATIENT
Start: 2024-06-16 | End: 2024-06-16

## 2024-06-16 RX ORDER — MORPHINE SULFATE 4 MG/ML
4 INJECTION INTRAVENOUS ONCE
Status: COMPLETED | OUTPATIENT
Start: 2024-06-16 | End: 2024-06-16

## 2024-06-16 ASSESSMENT — COLUMBIA-SUICIDE SEVERITY RATING SCALE - C-SSRS
2. HAVE YOU ACTUALLY HAD ANY THOUGHTS OF KILLING YOURSELF?: NO
2. HAVE YOU ACTUALLY HAD ANY THOUGHTS OF KILLING YOURSELF?: NO
1. IN THE PAST MONTH, HAVE YOU WISHED YOU WERE DEAD OR WISHED YOU COULD GO TO SLEEP AND NOT WAKE UP?: NO
6. HAVE YOU EVER DONE ANYTHING, STARTED TO DO ANYTHING, OR PREPARED TO DO ANYTHING TO END YOUR LIFE?: NO
6. HAVE YOU EVER DONE ANYTHING, STARTED TO DO ANYTHING, OR PREPARED TO DO ANYTHING TO END YOUR LIFE?: NO
1. IN THE PAST MONTH, HAVE YOU WISHED YOU WERE DEAD OR WISHED YOU COULD GO TO SLEEP AND NOT WAKE UP?: NO

## 2024-06-16 ASSESSMENT — PAIN SCALES - GENERAL
PAINLEVEL_OUTOF10: 10 - WORST POSSIBLE PAIN
PAINLEVEL_OUTOF10: 10 - WORST POSSIBLE PAIN

## 2024-06-16 ASSESSMENT — PAIN - FUNCTIONAL ASSESSMENT
PAIN_FUNCTIONAL_ASSESSMENT: 0-10
PAIN_FUNCTIONAL_ASSESSMENT: 0-10

## 2024-06-16 ASSESSMENT — PAIN DESCRIPTION - ONSET: ONSET: GRADUAL

## 2024-06-16 ASSESSMENT — PAIN DESCRIPTION - FREQUENCY: FREQUENCY: CONSTANT/CONTINUOUS

## 2024-06-16 ASSESSMENT — PAIN DESCRIPTION - DESCRIPTORS
DESCRIPTORS: PENETRATING
DESCRIPTORS: PRESSURE

## 2024-06-16 ASSESSMENT — PAIN DESCRIPTION - PAIN TYPE: TYPE: ACUTE PAIN

## 2024-06-16 ASSESSMENT — PAIN DESCRIPTION - PROGRESSION: CLINICAL_PROGRESSION: NOT CHANGED

## 2024-06-17 ENCOUNTER — APPOINTMENT (OUTPATIENT)
Dept: RADIOLOGY | Facility: HOSPITAL | Age: 79
End: 2024-06-17
Payer: COMMERCIAL

## 2024-06-17 DIAGNOSIS — J43.9 PULMONARY EMPHYSEMA, UNSPECIFIED EMPHYSEMA TYPE (MULTI): ICD-10-CM

## 2024-06-17 PROBLEM — J44.1 COPD WITH ACUTE EXACERBATION (MULTI): Status: ACTIVE | Noted: 2024-06-17

## 2024-06-17 LAB
ALBUMIN SERPL BCP-MCNC: 4.1 G/DL (ref 3.4–5)
ALP SERPL-CCNC: 74 U/L (ref 33–136)
ALT SERPL W P-5'-P-CCNC: 11 U/L (ref 7–45)
ANION GAP SERPL CALC-SCNC: 11 MMOL/L
APTT PPP: 35 SECONDS (ref 27–38)
AST SERPL W P-5'-P-CCNC: 15 U/L (ref 9–39)
BILIRUB SERPL-MCNC: 0.5 MG/DL (ref 0–1.2)
BNP SERPL-MCNC: 226 PG/ML (ref 0–99)
BUN SERPL-MCNC: 8 MG/DL (ref 6–23)
CALCIUM SERPL-MCNC: 9.7 MG/DL (ref 8.6–10.3)
CARDIAC TROPONIN I PNL SERPL HS: 7 NG/L (ref 0–13)
CARDIAC TROPONIN I PNL SERPL HS: 7 NG/L (ref 0–13)
CHLORIDE SERPL-SCNC: 97 MMOL/L (ref 98–107)
CO2 SERPL-SCNC: 38 MMOL/L (ref 21–32)
CREAT SERPL-MCNC: 0.56 MG/DL (ref 0.5–1.05)
EGFRCR SERPLBLD CKD-EPI 2021: >90 ML/MIN/1.73M*2
GLUCOSE SERPL-MCNC: 115 MG/DL (ref 74–99)
INR PPP: 1.1 (ref 0.9–1.1)
LACTATE SERPL-SCNC: 0.7 MMOL/L (ref 0.4–2)
POTASSIUM SERPL-SCNC: 3.2 MMOL/L (ref 3.5–5.3)
PROT SERPL-MCNC: 6.9 G/DL (ref 6.4–8.2)
PROTHROMBIN TIME: 12.9 SECONDS (ref 9.8–12.8)
SODIUM SERPL-SCNC: 143 MMOL/L (ref 136–145)

## 2024-06-17 PROCEDURE — 84484 ASSAY OF TROPONIN QUANT: CPT | Performed by: STUDENT IN AN ORGANIZED HEALTH CARE EDUCATION/TRAINING PROGRAM

## 2024-06-17 PROCEDURE — 2500000004 HC RX 250 GENERAL PHARMACY W/ HCPCS (ALT 636 FOR OP/ED): Performed by: INTERNAL MEDICINE

## 2024-06-17 PROCEDURE — 2500000005 HC RX 250 GENERAL PHARMACY W/O HCPCS: Performed by: INTERNAL MEDICINE

## 2024-06-17 PROCEDURE — 99223 1ST HOSP IP/OBS HIGH 75: CPT | Performed by: INTERNAL MEDICINE

## 2024-06-17 PROCEDURE — 94640 AIRWAY INHALATION TREATMENT: CPT | Mod: MUE

## 2024-06-17 PROCEDURE — 36415 COLL VENOUS BLD VENIPUNCTURE: CPT | Performed by: STUDENT IN AN ORGANIZED HEALTH CARE EDUCATION/TRAINING PROGRAM

## 2024-06-17 PROCEDURE — 2500000001 HC RX 250 WO HCPCS SELF ADMINISTERED DRUGS (ALT 637 FOR MEDICARE OP): Performed by: INTERNAL MEDICINE

## 2024-06-17 PROCEDURE — 97165 OT EVAL LOW COMPLEX 30 MIN: CPT | Mod: GO

## 2024-06-17 PROCEDURE — 2550000001 HC RX 255 CONTRASTS: Performed by: STUDENT IN AN ORGANIZED HEALTH CARE EDUCATION/TRAINING PROGRAM

## 2024-06-17 PROCEDURE — 94760 N-INVAS EAR/PLS OXIMETRY 1: CPT

## 2024-06-17 PROCEDURE — 1100000001 HC PRIVATE ROOM DAILY

## 2024-06-17 PROCEDURE — 94667 MNPJ CHEST WALL 1ST: CPT

## 2024-06-17 PROCEDURE — 2500000002 HC RX 250 W HCPCS SELF ADMINISTERED DRUGS (ALT 637 FOR MEDICARE OP, ALT 636 FOR OP/ED): Performed by: INTERNAL MEDICINE

## 2024-06-17 PROCEDURE — 2500000004 HC RX 250 GENERAL PHARMACY W/ HCPCS (ALT 636 FOR OP/ED): Performed by: PHYSICIAN ASSISTANT

## 2024-06-17 PROCEDURE — 71275 CT ANGIOGRAPHY CHEST: CPT | Performed by: SURGERY

## 2024-06-17 PROCEDURE — 99233 SBSQ HOSP IP/OBS HIGH 50: CPT | Performed by: PHYSICIAN ASSISTANT

## 2024-06-17 PROCEDURE — 71275 CT ANGIOGRAPHY CHEST: CPT

## 2024-06-17 RX ORDER — GUAIFENESIN 600 MG/1
600 TABLET, EXTENDED RELEASE ORAL 2 TIMES DAILY PRN
Status: DISCONTINUED | OUTPATIENT
Start: 2024-06-17 | End: 2024-06-17

## 2024-06-17 RX ORDER — FLUTICASONE FUROATE AND VILANTEROL 200; 25 UG/1; UG/1
1 POWDER RESPIRATORY (INHALATION)
Status: DISCONTINUED | OUTPATIENT
Start: 2024-06-17 | End: 2024-06-20 | Stop reason: HOSPADM

## 2024-06-17 RX ORDER — ISOSORBIDE MONONITRATE 30 MG/1
30 TABLET, EXTENDED RELEASE ORAL DAILY
Status: DISCONTINUED | OUTPATIENT
Start: 2024-06-17 | End: 2024-06-20 | Stop reason: HOSPADM

## 2024-06-17 RX ORDER — IPRATROPIUM BROMIDE AND ALBUTEROL SULFATE 2.5; .5 MG/3ML; MG/3ML
3 SOLUTION RESPIRATORY (INHALATION) EVERY 6 HOURS PRN
Status: CANCELLED | OUTPATIENT
Start: 2024-06-17

## 2024-06-17 RX ORDER — ACETAMINOPHEN 325 MG/1
650 TABLET ORAL EVERY 4 HOURS PRN
Status: DISCONTINUED | OUTPATIENT
Start: 2024-06-17 | End: 2024-06-20 | Stop reason: HOSPADM

## 2024-06-17 RX ORDER — SODIUM CHLORIDE 9 MG/ML
75 INJECTION, SOLUTION INTRAVENOUS CONTINUOUS
Status: ACTIVE | OUTPATIENT
Start: 2024-06-17 | End: 2024-06-18

## 2024-06-17 RX ORDER — PANTOPRAZOLE SODIUM 40 MG/1
40 TABLET, DELAYED RELEASE ORAL
Status: DISCONTINUED | OUTPATIENT
Start: 2024-06-17 | End: 2024-06-20 | Stop reason: HOSPADM

## 2024-06-17 RX ORDER — PRAVASTATIN SODIUM 40 MG/1
40 TABLET ORAL DAILY
Status: DISCONTINUED | OUTPATIENT
Start: 2024-06-17 | End: 2024-06-20 | Stop reason: HOSPADM

## 2024-06-17 RX ORDER — LIDOCAINE 560 MG/1
1 PATCH PERCUTANEOUS; TOPICAL; TRANSDERMAL DAILY
Status: DISCONTINUED | OUTPATIENT
Start: 2024-06-17 | End: 2024-06-20

## 2024-06-17 RX ORDER — GUAIFENESIN 600 MG/1
1200 TABLET, EXTENDED RELEASE ORAL 2 TIMES DAILY
Status: DISCONTINUED | OUTPATIENT
Start: 2024-06-17 | End: 2024-06-20 | Stop reason: HOSPADM

## 2024-06-17 RX ORDER — IPRATROPIUM BROMIDE AND ALBUTEROL SULFATE 2.5; .5 MG/3ML; MG/3ML
3 SOLUTION RESPIRATORY (INHALATION)
Qty: 120 ML | Refills: 0 | Status: SHIPPED | OUTPATIENT
Start: 2024-06-17 | End: 2024-07-17

## 2024-06-17 RX ORDER — GUAIFENESIN/DEXTROMETHORPHAN 100-10MG/5
5 SYRUP ORAL EVERY 4 HOURS PRN
Status: DISCONTINUED | OUTPATIENT
Start: 2024-06-17 | End: 2024-06-17

## 2024-06-17 RX ORDER — BISACODYL 10 MG/1
10 SUPPOSITORY RECTAL DAILY PRN
Status: DISCONTINUED | OUTPATIENT
Start: 2024-06-17 | End: 2024-06-20 | Stop reason: HOSPADM

## 2024-06-17 RX ORDER — IPRATROPIUM BROMIDE AND ALBUTEROL SULFATE 2.5; .5 MG/3ML; MG/3ML
3 SOLUTION RESPIRATORY (INHALATION)
Status: DISCONTINUED | OUTPATIENT
Start: 2024-06-17 | End: 2024-06-17

## 2024-06-17 RX ORDER — IPRATROPIUM BROMIDE AND ALBUTEROL SULFATE 2.5; .5 MG/3ML; MG/3ML
3 SOLUTION RESPIRATORY (INHALATION) 4 TIMES DAILY PRN
Status: DISCONTINUED | OUTPATIENT
Start: 2024-06-17 | End: 2024-06-20 | Stop reason: HOSPADM

## 2024-06-17 RX ORDER — MORPHINE SULFATE 2 MG/ML
2 INJECTION, SOLUTION INTRAMUSCULAR; INTRAVENOUS EVERY 4 HOURS PRN
Status: DISCONTINUED | OUTPATIENT
Start: 2024-06-17 | End: 2024-06-20 | Stop reason: HOSPADM

## 2024-06-17 RX ORDER — METOPROLOL TARTRATE 50 MG/1
50 TABLET ORAL 2 TIMES DAILY
Status: DISCONTINUED | OUTPATIENT
Start: 2024-06-17 | End: 2024-06-20 | Stop reason: HOSPADM

## 2024-06-17 RX ORDER — ALBUTEROL SULFATE 90 UG/1
2 AEROSOL, METERED RESPIRATORY (INHALATION) 4 TIMES DAILY PRN
Qty: 20.1 G | Refills: 0 | Status: SHIPPED | OUTPATIENT
Start: 2024-06-17 | End: 2025-06-17

## 2024-06-17 RX ORDER — MORPHINE SULFATE 4 MG/ML
4 INJECTION INTRAVENOUS EVERY 4 HOURS PRN
Status: DISCONTINUED | OUTPATIENT
Start: 2024-06-17 | End: 2024-06-20 | Stop reason: HOSPADM

## 2024-06-17 RX ORDER — IPRATROPIUM BROMIDE AND ALBUTEROL SULFATE 2.5; .5 MG/3ML; MG/3ML
3 SOLUTION RESPIRATORY (INHALATION) 4 TIMES DAILY
Status: DISCONTINUED | OUTPATIENT
Start: 2024-06-17 | End: 2024-06-20 | Stop reason: HOSPADM

## 2024-06-17 RX ORDER — FUROSEMIDE 20 MG/1
20 TABLET ORAL DAILY
Status: DISCONTINUED | OUTPATIENT
Start: 2024-06-17 | End: 2024-06-20 | Stop reason: HOSPADM

## 2024-06-17 RX ORDER — POLYETHYLENE GLYCOL 3350 17 G/17G
17 POWDER, FOR SOLUTION ORAL DAILY
Status: DISCONTINUED | OUTPATIENT
Start: 2024-06-17 | End: 2024-06-20

## 2024-06-17 RX ORDER — AZITHROMYCIN 250 MG/1
250 TABLET, FILM COATED ORAL DAILY
Qty: 6 TABLET | Refills: 0 | Status: SHIPPED | OUTPATIENT
Start: 2024-06-17 | End: 2024-06-20 | Stop reason: HOSPADM

## 2024-06-17 RX ORDER — PREDNISONE 10 MG/1
10 TABLET ORAL DAILY
Qty: 75 TABLET | Refills: 0 | Status: SHIPPED | OUTPATIENT
Start: 2024-06-17 | End: 2024-06-20 | Stop reason: HOSPADM

## 2024-06-17 RX ORDER — HYDROXYZINE HYDROCHLORIDE 25 MG/1
25 TABLET, FILM COATED ORAL EVERY 8 HOURS PRN
Status: DISCONTINUED | OUTPATIENT
Start: 2024-06-17 | End: 2024-06-20 | Stop reason: HOSPADM

## 2024-06-17 SDOH — SOCIAL STABILITY: SOCIAL INSECURITY: HAVE YOU HAD ANY THOUGHTS OF HARMING ANYONE ELSE?: NO

## 2024-06-17 SDOH — SOCIAL STABILITY: SOCIAL INSECURITY: DO YOU FEEL ANYONE HAS EXPLOITED OR TAKEN ADVANTAGE OF YOU FINANCIALLY OR OF YOUR PERSONAL PROPERTY?: NO

## 2024-06-17 SDOH — SOCIAL STABILITY: SOCIAL INSECURITY: WERE YOU ABLE TO COMPLETE ALL THE BEHAVIORAL HEALTH SCREENINGS?: YES

## 2024-06-17 SDOH — SOCIAL STABILITY: SOCIAL INSECURITY: DOES ANYONE TRY TO KEEP YOU FROM HAVING/CONTACTING OTHER FRIENDS OR DOING THINGS OUTSIDE YOUR HOME?: NO

## 2024-06-17 SDOH — SOCIAL STABILITY: SOCIAL INSECURITY: ABUSE: ADULT

## 2024-06-17 SDOH — SOCIAL STABILITY: SOCIAL INSECURITY: HAVE YOU HAD THOUGHTS OF HARMING ANYONE ELSE?: NO

## 2024-06-17 SDOH — SOCIAL STABILITY: SOCIAL INSECURITY: ARE YOU OR HAVE YOU BEEN THREATENED OR ABUSED PHYSICALLY, EMOTIONALLY, OR SEXUALLY BY ANYONE?: NO

## 2024-06-17 SDOH — SOCIAL STABILITY: SOCIAL INSECURITY: DO YOU FEEL UNSAFE GOING BACK TO THE PLACE WHERE YOU ARE LIVING?: NO

## 2024-06-17 SDOH — SOCIAL STABILITY: SOCIAL INSECURITY: HAS ANYONE EVER THREATENED TO HURT YOUR FAMILY OR YOUR PETS?: NO

## 2024-06-17 SDOH — SOCIAL STABILITY: SOCIAL INSECURITY: ARE THERE ANY APPARENT SIGNS OF INJURIES/BEHAVIORS THAT COULD BE RELATED TO ABUSE/NEGLECT?: NO

## 2024-06-17 ASSESSMENT — ENCOUNTER SYMPTOMS
DIAPHORESIS: 0
FATIGUE: 0
BRUISES/BLEEDS EASILY: 0
PALPITATIONS: 0
DIZZINESS: 0
CHEST TIGHTNESS: 1
FLANK PAIN: 0
NECK PAIN: 0
CONFUSION: 0
HALLUCINATIONS: 0
EYE PAIN: 0
CHEST TIGHTNESS: 0
FATIGUE: 1
FREQUENCY: 0
WOUND: 0
SPEECH DIFFICULTY: 0
COUGH: 0
WEAKNESS: 0
ABDOMINAL PAIN: 0
EYES NEGATIVE: 1
POLYDIPSIA: 0
SORE THROAT: 0
SEIZURES: 0
FEVER: 0
CHILLS: 0
BLOOD IN STOOL: 0
APPETITE CHANGE: 0
CONSTIPATION: 0
LIGHT-HEADEDNESS: 0
HEADACHES: 0
JOINT SWELLING: 0
NUMBNESS: 0
WHEEZING: 0
FACIAL SWELLING: 0
WHEEZING: 1
TROUBLE SWALLOWING: 0
SHORTNESS OF BREATH: 1
HEMATURIA: 0
VOMITING: 0
BACK PAIN: 0
DIARRHEA: 0
DYSURIA: 0
WEAKNESS: 1
NAUSEA: 0

## 2024-06-17 ASSESSMENT — COGNITIVE AND FUNCTIONAL STATUS - GENERAL
MOBILITY SCORE: 24
HELP NEEDED FOR BATHING: A LOT
DAILY ACTIVITIY SCORE: 24
DAILY ACTIVITIY SCORE: 16
CLIMB 3 TO 5 STEPS WITH RAILING: A LITTLE
EATING MEALS: A LITTLE
TOILETING: A LITTLE
MOBILITY SCORE: 23
TOILETING: A LITTLE
PATIENT BASELINE BEDBOUND: NO
DAILY ACTIVITIY SCORE: 16
DRESSING REGULAR UPPER BODY CLOTHING: A LITTLE
MOBILITY SCORE: 23
DRESSING REGULAR UPPER BODY CLOTHING: A LITTLE
DAILY ACTIVITIY SCORE: 24
DRESSING REGULAR LOWER BODY CLOTHING: A LOT
DRESSING REGULAR LOWER BODY CLOTHING: A LOT
EATING MEALS: A LITTLE
PERSONAL GROOMING: A LITTLE
PERSONAL GROOMING: A LITTLE
HELP NEEDED FOR BATHING: A LOT
CLIMB 3 TO 5 STEPS WITH RAILING: A LITTLE

## 2024-06-17 ASSESSMENT — PAIN - FUNCTIONAL ASSESSMENT
PAIN_FUNCTIONAL_ASSESSMENT: 0-10

## 2024-06-17 ASSESSMENT — PATIENT HEALTH QUESTIONNAIRE - PHQ9
1. LITTLE INTEREST OR PLEASURE IN DOING THINGS: NOT AT ALL
SUM OF ALL RESPONSES TO PHQ9 QUESTIONS 1 & 2: 0
2. FEELING DOWN, DEPRESSED OR HOPELESS: NOT AT ALL

## 2024-06-17 ASSESSMENT — PAIN SCALES - GENERAL
PAINLEVEL_OUTOF10: 4
PAINLEVEL_OUTOF10: 2
PAINLEVEL_OUTOF10: 6
PAINLEVEL_OUTOF10: 10 - WORST POSSIBLE PAIN
PAINLEVEL_OUTOF10: 6
PAINLEVEL_OUTOF10: 0 - NO PAIN
PAINLEVEL_OUTOF10: 7
PAINLEVEL_OUTOF10: 0 - NO PAIN

## 2024-06-17 ASSESSMENT — HEART SCORE
RISK FACTORS: 1-2 RISK FACTORS
HISTORY: SLIGHTLY SUSPICIOUS
AGE: 65+
TROPONIN: LESS THAN OR EQUAL TO NORMAL LIMIT
ECG: NORMAL
HEART SCORE: 3

## 2024-06-17 ASSESSMENT — PAIN DESCRIPTION - LOCATION: LOCATION: BACK

## 2024-06-17 ASSESSMENT — ACTIVITIES OF DAILY LIVING (ADL)
ADL_ASSISTANCE: INDEPENDENT
HEARING - LEFT EAR: HEARING AID
FEEDING YOURSELF: INDEPENDENT
PATIENT'S MEMORY ADEQUATE TO SAFELY COMPLETE DAILY ACTIVITIES?: YES
LACK_OF_TRANSPORTATION: NO
BATHING: INDEPENDENT
WALKS IN HOME: INDEPENDENT
JUDGMENT_ADEQUATE_SAFELY_COMPLETE_DAILY_ACTIVITIES: YES
ADEQUATE_TO_COMPLETE_ADL: YES
LACK_OF_TRANSPORTATION: NO
DRESSING YOURSELF: INDEPENDENT
TOILETING: INDEPENDENT
BATHING_ASSISTANCE: MAXIMAL
GROOMING: INDEPENDENT
HEARING - RIGHT EAR: HEARING AID

## 2024-06-17 NOTE — H&P
History Of Present Illness  Abigail Aquino is a 79 y.o. female with past medical history of O2 dependent COPD/emphysema, coronary artery disease, adenocarcinoma of the left lung, remote pulmonary emboli, hypertension, dyslipidemia presenting with increasing shortness of breath.  Patient states that she has intermittent episodes where she just will desat into the 70s at home with minimal exertion.  She is able to rest and recover.  She states that the last day and a half she has noted wheezing and increasing shortness of breath and less endurance.  She denies having any chest pain with this she denies having any fevers or chills.  Patient does have a cough.  In the emergency room patient on her 4 L nasal cannula satting sats in the mid 90s.  Patient will drop down into the low 80s with exertion.  BMP has a potassium of 3.2 otherwise is unremarkable.  Liver enzymes are within normal limits.  Patient's BNP is 226 troponin of 7 with repeat of 7.  CBC is unremarkable.  Venous gas shows pH of 7.36 with a pCO2 of 74 which is patient's baseline.  Chest x-ray shows slight increased size of nodular opacity in the left lower lobe corresponding to patient's known malignancy otherwise her lungs are clear.  Given patient's hypoxia patient had a CT angiogram obtained that shows no significant change with patient having known pulmonary mass now is 3.4 cm.  Patient is being admitted to the hospital due to her inability to ambulate without significant desaturation.  Patient states that she is yet to be seen by pulmonology.  She is being set up with Dr. Reyes for radiation treatment but does not know if she can tolerate the procedure.  Patient if you look at the last hospitalizations that she has has been delaying or canceling her treatments for this lung malignancy.  She is having a hard time I believe excepting that she has no pulmonary reserves and that there is no improvement in her future for her lung status.  We will ask  pulmonary to see patient to maximize her pulmonary status.     Past Medical History  O2 dependent COPD/emphysema, coronary artery disease, adenocarcinoma of the left lung, remote pulmonary emboli, hypertension, dyslipidemia  She has a past medical history of Clotting disorder (Multi), COPD (chronic obstructive pulmonary disease) (Multi), Eyebrow laceration, right, sequela (10/04/2023), Fracture tibia/fibula, right, sequela (10/11/2021), Hypertension, Kidney stones (05/09/2017), and Solitary pulmonary nodule (09/06/2023).    She has no past medical history of Diabetes mellitus (Multi).    Surgical History  She has a past surgical history that includes Tonsillectomy; Appendectomy; and CT guided percutaneous biopsy lung (12/21/2023).     Social History  She reports that she quit smoking about 9 months ago. Her smoking use included cigarettes. She has been exposed to tobacco smoke. She has never used smokeless tobacco. She reports that she does not drink alcohol and does not use drugs.    Family History  Family History   Problem Relation Name Age of Onset    Cancer Daughter          Allergies  Atorvastatin and Oxycodone    Meds    Current Facility-Administered Medications:     acetaminophen (Tylenol) tablet 650 mg, 650 mg, oral, q4h PRN, Bryant Rain MD    acetaminophen (Tylenol) tablet 650 mg, 650 mg, oral, q4h PRN, Bryant Rain MD    apixaban (Eliquis) tablet 5 mg, 5 mg, oral, BID, Bryant Rain MD    benzocaine-menthol (Cepastat Sore Throat) lozenge 1 lozenge, 1 lozenge, Mouth/Throat, q2h PRN, Bryant Rain MD    bisacodyl (Dulcolax) suppository 10 mg, 10 mg, rectal, Daily PRN, Bryant Rain MD    dextromethorphan-guaifenesin (Robitussin DM)  mg/5 mL oral liquid 5 mL, 5 mL, oral, q4h PRN, Bryant Rain MD    doxycycline (Vibramycin) in sodium chloride 0.9%  mg, 100 mg, intravenous, q12h, Bryant Rain MD    tiotropium (Spiriva Respimat) 2.5 mcg/actuation inhaler 2 puff, 2 puff,  inhalation, Daily **AND** fluticasone furoate-vilanteroL (Breo Ellipta) 200-25 mcg/dose inhaler 1 puff, 1 puff, inhalation, Daily, Bryant Rain MD    furosemide (Lasix) tablet 20 mg, 20 mg, oral, Daily, Bryant Rain MD    guaiFENesin (Mucinex) 12 hr tablet 600 mg, 600 mg, oral, BID PRN, Bryant Rain MD    ipratropium-albuteroL (Duo-Neb) 0.5-2.5 mg/3 mL nebulizer solution 3 mL, 3 mL, nebulization, 4x daily PRN, Bryant Rain MD    ipratropium-albuteroL (Duo-Neb) 0.5-2.5 mg/3 mL nebulizer solution 3 mL, 3 mL, nebulization, q6h, Bryant Rain MD    isosorbide mononitrate ER (Imdur) 24 hr tablet 30 mg, 30 mg, oral, Daily, Bryant Rain MD    lidocaine 4 % patch 1 patch, 1 patch, transdermal, Daily, Bryant Rain MD    methylPREDNISolone sod succinate (SOLU-Medrol) 40 mg/mL injection 40 mg, 40 mg, intravenous, q12h, Bryant Rain MD    metoprolol tartrate (Lopressor) tablet 50 mg, 50 mg, oral, BID, Bryant Rain MD    oxygen (O2) therapy, , inhalation, Continuous - Inhalation, Bryant Rain MD    pantoprazole (ProtoNix) EC tablet 40 mg, 40 mg, oral, Daily before breakfast, Bryant Rain MD    polyethylene glycol (Glycolax, Miralax) packet 17 g, 17 g, oral, Daily, Bryant Rain MD    pravastatin (Pravachol) tablet 40 mg, 40 mg, oral, Daily, Bryant Rain MD    sodium chloride 0.9% infusion, 75 mL/hr, intravenous, Continuous, Bryant Rain MD    Review of Systems   Constitutional:  Positive for fatigue. Negative for chills and fever.   HENT:  Negative for congestion and sore throat.    Eyes: Negative.    Respiratory:  Positive for chest tightness, shortness of breath and wheezing. Negative for cough.    Cardiovascular:  Negative for chest pain, palpitations and leg swelling.   Gastrointestinal:  Negative for abdominal pain, blood in stool, nausea and vomiting.   Endocrine: Negative for polydipsia and polyuria.   Genitourinary:  Negative for dysuria and flank pain.    Musculoskeletal:  Negative for back pain, gait problem and neck pain.   Skin: Negative.    Neurological:  Positive for weakness. Negative for dizziness, seizures, speech difficulty and numbness.   Psychiatric/Behavioral:  Negative for confusion.         Physical Exam   Constitutional:       Appearance: Normal appearance. She is not ill-appearing or toxic-appearing.   HENT:      Head: Normocephalic and atraumatic.      Mouth/Throat:      Mouth: Mucous membranes are moist.   Eyes:      Extraocular Movements: Extraocular movements intact.      Pupils: Pupils are equal, round, and reactive to light.   Cardiovascular:      Rate and Rhythm: Normal rate and regular rhythm.      Pulses: Normal pulses.      Heart sounds: Normal heart sounds. No murmur heard.  Pulmonary:      Breath sounds: No stridor. Wheezing present. No rhonchi or rales.   Chest:      Chest wall: No tenderness.   Abdominal:      General: Abdomen is flat. Bowel sounds are normal.      Palpations: Abdomen is soft.      Tenderness: There is no abdominal tenderness.   Musculoskeletal:         General: Normal range of motion.   Skin:     Coloration: Skin is not jaundiced.      Findings: No erythema or rash.   Neurological:      General: No focal deficit present.      Mental Status: She is alert and oriented to person, place, and time.   Psychiatric:         Mood and Affect: Mood normal.         Thought Content: Thought content normal.         Judgment: Judgment normal.      Last Recorded Vitals  /73 (BP Location: Left arm, Patient Position: Sitting)   Pulse 99   Temp 36 °C (96.8 °F) (Temporal)   Resp 18   Wt 59 kg (130 lb)   SpO2 (!) 89% Comment: LATRICE Moody notified    Relevant Results     Results for orders placed or performed during the hospital encounter of 06/16/24 (from the past 24 hour(s))   CBC and Auto Differential   Result Value Ref Range    WBC 10.3 4.4 - 11.3 x10*3/uL    nRBC 0.0 0.0 - 0.0 /100 WBCs    RBC 4.57 4.00 - 5.20 x10*6/uL     Hemoglobin 14.3 12.0 - 16.0 g/dL    Hematocrit 44.0 36.0 - 46.0 %    MCV 96 80 - 100 fL    MCH 31.3 26.0 - 34.0 pg    MCHC 32.5 32.0 - 36.0 g/dL    RDW 12.8 11.5 - 14.5 %    Platelets 247 150 - 450 x10*3/uL    Neutrophils % 72.8 40.0 - 80.0 %    Immature Granulocytes %, Automated 0.6 0.0 - 0.9 %    Lymphocytes % 12.0 13.0 - 44.0 %    Monocytes % 10.4 2.0 - 10.0 %    Eosinophils % 3.4 0.0 - 6.0 %    Basophils % 0.8 0.0 - 2.0 %    Neutrophils Absolute 7.52 (H) 1.60 - 5.50 x10*3/uL    Immature Granulocytes Absolute, Automated 0.06 0.00 - 0.50 x10*3/uL    Lymphocytes Absolute 1.24 0.80 - 3.00 x10*3/uL    Monocytes Absolute 1.07 (H) 0.05 - 0.80 x10*3/uL    Eosinophils Absolute 0.35 0.00 - 0.40 x10*3/uL    Basophils Absolute 0.08 0.00 - 0.10 x10*3/uL   Comprehensive metabolic panel   Result Value Ref Range    Glucose 115 (H) 74 - 99 mg/dL    Sodium 143 136 - 145 mmol/L    Potassium 3.2 (L) 3.5 - 5.3 mmol/L    Chloride 97 (L) 98 - 107 mmol/L    Bicarbonate 38 (H) 21 - 32 mmol/L    Anion Gap 11 mmol/L    Urea Nitrogen 8 6 - 23 mg/dL    Creatinine 0.56 0.50 - 1.05 mg/dL    eGFR >90 >60 mL/min/1.73m*2    Calcium 9.7 8.6 - 10.3 mg/dL    Albumin 4.1 3.4 - 5.0 g/dL    Alkaline Phosphatase 74 33 - 136 U/L    Total Protein 6.9 6.4 - 8.2 g/dL    AST 15 9 - 39 U/L    Bilirubin, Total 0.5 0.0 - 1.2 mg/dL    ALT 11 7 - 45 U/L   Protime-INR   Result Value Ref Range    Protime 12.9 (H) 9.8 - 12.8 seconds    INR 1.1 0.9 - 1.1   aPTT   Result Value Ref Range    aPTT 35 27 - 38 seconds   B-Type Natriuretic Peptide   Result Value Ref Range     (H) 0 - 99 pg/mL   Lactate   Result Value Ref Range    Lactate 0.7 0.4 - 2.0 mmol/L   Blood Gas Venous Full Panel   Result Value Ref Range    POCT pH, Venous 7.36 7.33 - 7.43 pH    POCT pCO2, Venous 74 (HH) 41 - 51 mm Hg    POCT pO2, Venous 33 (L) 35 - 45 mm Hg    POCT SO2, Venous 55 45 - 75 %    POCT Oxy Hemoglobin, Venous 53.6 45.0 - 75.0 %    POCT Hematocrit Calculated, Venous 43.0 36.0 -  46.0 %    POCT Sodium, Venous 139 136 - 145 mmol/L    POCT Potassium, Venous 3.2 (L) 3.5 - 5.3 mmol/L    POCT Chloride, Venous 98 98 - 107 mmol/L    POCT Ionized Calicum, Venous 1.18 1.10 - 1.33 mmol/L    POCT Glucose, Venous 122 (H) 74 - 99 mg/dL    POCT Lactate, Venous 1.0 0.4 - 2.0 mmol/L    POCT Base Excess, Venous 12.7 (H) -2.0 - 3.0 mmol/L    POCT HCO3 Calculated, Venous 41.8 (H) 22.0 - 26.0 mmol/L    POCT Hemoglobin, Venous 14.4 12.0 - 16.0 g/dL    POCT Anion Gap, Venous 2.0 (L) 10.0 - 25.0 mmol/L    Patient Temperature 37.0 degrees Celsius    FiO2 40 %   Troponin I, High Sensitivity, Initial   Result Value Ref Range    Troponin I, High Sensitivity 7 0 - 13 ng/L   Troponin, High Sensitivity, 1 Hour   Result Value Ref Range    Troponin I, High Sensitivity 7 0 - 13 ng/L        Recent Imaging  CT angio chest for pulmonary embolism    Result Date: 6/17/2024  Interpreted By:  Sami Jeffers, STUDY: CT ANGIO CHEST FOR PULMONARY EMBOLISM;  6/17/2024 1:23 am   INDICATION: Signs/Symptoms:CP SOB.   COMPARISON: 05/02/2024. Correlation also made to chest radiography of 06/16/2024.   ACCESSION NUMBER(S): [id number]   ORDERING CLINICIAN: JOSE GUADALUPE PORTILLO   TECHNIQUE: Helical data acquisition of the chest was obtained after intravenous administration of 75 ML Omnipaque 350, as per PE protocol. Images were reformatted in coronal and sagittal planes. Axial and coronal maximum intensity projection (MIP) images were created and reviewed.   FINDINGS: POTENTIAL LIMITATIONS OF THE STUDY: Respiratory motion.   HEART AND VESSELS: No acute pulmonary embolus through the segmental level. Main pulmonary artery and its branches are normal in caliber.   The thoracic aorta normal in course and caliber.There is moderate atherosclerosis present, including calcified and noncalcified plaques. Moderate coronary artery calcifications are seen. Please note,the study is not optimized for evaluation of coronary arteries.   The cardiac chambers are  not enlarged.   There is no pericardial effusion seen.   MEDIASTINUM AND SHELIA, LOWER NECK AND AXILLA: The visualized thyroid gland is within normal limits. No evidence of thoracic lymphadenopathy by CT criteria. Esophagus appears within normal limits as seen.   LUNGS AND AIRWAYS: The trachea and central airways are patent. No endobronchial lesion is seen.   Respiratory motion artifact limits evaluation of lung parenchyma.   Patchy asymmetric reticulonodular opacity posteriorly in the right lung base may relate to scarring or bronchopneumonia, possibly secondary to aspiration. No focal consolidation, pleural effusion, or pneumothorax.   No substantial interval change in size or appearance of left lower lobe mass measuring up to 3.4 cm in transaxial diameter compatible with patient's known lung malignancy.   Moderate to severe emphysema. Similar appearance of cylindrical right lower lobe cylindrical bronchiectasis, probably sequelae of chronic infection.       UPPER ABDOMEN: The visualized subdiaphragmatic structures demonstrate no remarkable findings.       CHEST WALL AND OSSEOUS STRUCTURES: Chest wall is within normal limits. No acute osseous pathology.There are no suspicious osseous lesions.Mild compression of the T8 vertebral body, new from the prior exam, but otherwise age-indeterminate. Similar appearance of burst fracture deformity of the L1 vertebral body with mild-to-moderate height loss and retropulsion of the superior endplate with associated mild-to-moderate canal narrowing.       No acute pulmonary embolus through the segmental level.   Respiratory motion artifact limits evaluation of lung parenchyma.   Patchy asymmetric reticulonodular opacity posteriorly in the right lung base may relate to scarring or bronchopneumonia, possibly secondary to aspiration. No focal consolidation, pleural effusion, or pneumothorax.   No substantial interval change in size or appearance of left lower lobe mass measuring up  to 3.4 cm in transaxial diameter compatible with patient's known lung malignancy.   Moderate to severe emphysema. Similar appearance of cylindrical right lower lobe cylindrical bronchiectasis, probably sequelae of chronic infection.   Atherosclerosis, including coronary artery disease.   Mild compression of the T8 vertebral body, new from the prior exam of 05/02/2024, but otherwise age-indeterminate. Correlate with any recent trauma and physical exam findings to better assess its age. No definite underlying lesion to suggest pathologic etiology.   Additional findings as discussed above.   MACRO: None   Signed by: Sami Jeffers 6/17/2024 2:13 AM Dictation workstation:   [id number]    XR chest 1 view    Result Date: 6/17/2024  Interpreted By:  Sami Jeffers, STUDY: XR CHEST 1 VIEW;  6/16/2024 11:33 pm   INDICATION: Signs/Symptoms:sob.   COMPARISON: 05/02/2024.   ACCESSION NUMBER(S): [id number]   ORDERING CLINICIAN: JOSE GUADALUPE PORTILLO   FINDINGS: AP radiograph of the chest was provided.       CARDIOMEDIASTINAL SILHOUETTE: Cardiomediastinal silhouette is normal in size and configuration.   LUNGS: There is slight increased size of nodular opacity in the left lower lobe corresponding to patient's known malignancy. Lungs otherwise appear clear. No pleural effusion or pneumothorax.   ABDOMEN: No remarkable upper abdominal findings.   BONES: No acute osseous changes.       1.  There is slight increased size of nodular opacity in the left lower lobe corresponding to patient's known malignancy. Lungs otherwise appear clear. No pleural effusion or pneumothorax.       MACRO: None   Signed by: Sami Jeffers 6/17/2024 12:01 AM Dictation workstation:   [id number]      Assessment and Plan  #1 COPD exacerbation  Patient is placed on Solu-Medrol beta agonist O2 support and doxycycline.  Patient is treated for COPD exacerbation.  Patient has minimal reserves at baseline.  Patient is chronically O2 dependent requiring 3 to 4 L continuously.   With ambulation patient desats this is no different than patient has been doing for the past 6 months.  Pulmonary consultation will be obtained to try and maximize patient's ambulatory and exertional capabilities and explained to the patient's limitations that will not improve.    #2 adenocarcinoma of the lung  Patient has diagnosis from February but has yet to have any treatment.  Patient keeps canceling appointments deferring any type of treatments.  Currently is being set up for radiation therapy with Dr. Menezes.  But is already said to me she is going to cancel the appointment next week.    #3 remote history of pulmonary emboli  Patient chronically anticoagulated with Eliquis    #4 coronary artery disease  Patient is having intermittent chest tightness but is more of a bronchospasm inability to get a deep breath then it is actual chest pain of angina.  Cardiac enzymes negative.    #5 hypertension  Stable on home meds.    #6 social issues  Patient states that she has a neighbor who is helping to take care of her.  She has had different services out to assist her but they cannot provide services as there are not enough staffing.  Patient has 2 children that are highly successful 1 and [facility] and 1 in [place] that patient states she will not consider even moving into those areas with them.  Patient is adamant that she does not want to go to a nursing facility.      Bryant Rain MD

## 2024-06-17 NOTE — PROGRESS NOTES
[facility]  Emergency department provider transition of care note       Assumed care of patient that was signed out to me in stable condition with work-up /disposition pending.    History/Exam limitations: none.   Additional history was obtained from past medical records.    HPI: Abigail Aquino  is a 79 y.o. with a complaint of   Chief Complaint   Patient presents with    Chest Pain     Pt states pain started yesterday. Pt states it feels as though her chest is being squeezed    Back Pain     Pt has a recent dx of a compression fracture of L1    Shortness of Breath     Pt has a cough and states she is low 80s pulse ox at baseline. Hx of copd.      Past medical history and surgical history reviewed and as documented.  History reviewed and as noted. past medical records reviewed.    Past medical records, triage/nursing notes and vital signs reviewed as available and as noted above.    PHYSICAL EXAM  Vital Signs reviewed and noted to be within normal limits. the patient is not hypoxic.  -General: Alert, no acute distress, patient resting comfortably  -Skin: warm, intact, no pallor noted  -Head: Normocephalic, atraumatic  -Eye: Normal conjunctiva  -Cardiac: Normal peripheral perfusion, normal heart sounds  -Respiratory: No acute distress, no conversational dyspnea.  Lung sounds slightly diminished but no active wheezing and symmetrical expansion noted  -Musculoskeletal: No deformity, full ROM.  -Neurological: alert and oriented, normal sensory and motor observed.  -Psychiatric: Cooperative        Labs and imaging reviewed by me  and note     Labs Reviewed   CBC WITH AUTO DIFFERENTIAL - Abnormal       Result Value    WBC 10.3      nRBC 0.0      RBC 4.57      Hemoglobin 14.3      Hematocrit 44.0      MCV 96      MCH 31.3      MCHC 32.5      RDW 12.8      Platelets 247      Neutrophils % 72.8      Immature Granulocytes %, Automated 0.6      Lymphocytes % 12.0      Monocytes % 10.4      Eosinophils % 3.4       Basophils % 0.8      Neutrophils Absolute 7.52 (*)     Immature Granulocytes Absolute, Automated 0.06      Lymphocytes Absolute 1.24      Monocytes Absolute 1.07 (*)     Eosinophils Absolute 0.35      Basophils Absolute 0.08     COMPREHENSIVE METABOLIC PANEL - Abnormal    Glucose 115 (*)     Sodium 143      Potassium 3.2 (*)     Chloride 97 (*)     Bicarbonate 38 (*)     Anion Gap 11      Urea Nitrogen 8      Creatinine 0.56      eGFR >90      Calcium 9.7      Albumin 4.1      Alkaline Phosphatase 74      Total Protein 6.9      AST 15      Bilirubin, Total 0.5      ALT 11     PROTIME-INR - Abnormal    Protime 12.9 (*)     INR 1.1     B-TYPE NATRIURETIC PEPTIDE - Abnormal     (*)     Narrative:        <100 pg/mL - Heart failure unlikely  100-299 pg/mL - Intermediate probability of acute heart                  failure exacerbation. Correlate with clinical                  context and patient history.    >=300 pg/mL - Heart Failure likely. Correlate with clinical                  context and patient history.    BNP testing is performed using different testing methodology at [facility] than at other [facility]. Direct result comparisons should only be made within the same method.      BLOOD GAS VENOUS FULL PANEL - Abnormal    POCT pH, Venous 7.36      POCT pCO2, Venous 74 (*)     POCT pO2, Venous 33 (*)     POCT SO2, Venous 55      POCT Oxy Hemoglobin, Venous 53.6      POCT Hematocrit Calculated, Venous 43.0      POCT Sodium, Venous 139      POCT Potassium, Venous 3.2 (*)     POCT Chloride, Venous 98      POCT Ionized Calicum, Venous 1.18      POCT Glucose, Venous 122 (*)     POCT Lactate, Venous 1.0      POCT Base Excess, Venous 12.7 (*)     POCT HCO3 Calculated, Venous 41.8 (*)     POCT Hemoglobin, Venous 14.4      POCT Anion Gap, Venous 2.0 (*)     Patient Temperature 37.0      FiO2 40     APTT - Normal    aPTT 35      Narrative:     The APTT is no longer used for monitoring Unfractionated  Heparin Therapy. For monitoring Heparin Therapy, use the Heparin Assay.   LACTATE - Normal    Lactate 0.7      Narrative:     Venipuncture immediately after or during the administration of Metamizole may lead to falsely low results. Testing should be performed immediately  prior to Metamizole dosing.   SERIAL TROPONIN-INITIAL - Normal    Troponin I, High Sensitivity 7      Narrative:     Less than 99th percentile of normal range cutoff-  Female and children under 18 years old <14 ng/L; Male <21 ng/L: Negative  Repeat testing should be performed if clinically indicated.     Female and children under 18 years old 14-50 ng/L; Male 21-50 ng/L:  Consistent with possible cardiac damage and possible increased clinical   risk. Serial measurements may help to assess extent of myocardial damage.     >50 ng/L: Consistent with cardiac damage, increased clinical risk and  myocardial infarction. Serial measurements may help assess extent of   myocardial damage.      NOTE: Children less than 1 year old may have higher baseline troponin   levels and results should be interpreted in conjunction with the overall   clinical context.     NOTE: Troponin I testing is performed using a different   testing methodology at [facility] than at other   [facility]. Direct result comparisons should only   be made within the same method.   SERIAL TROPONIN, 1 HOUR - Normal    Troponin I, High Sensitivity 7      Narrative:     Less than 99th percentile of normal range cutoff-  Female and children under 18 years old <14 ng/L; Male <21 ng/L: Negative  Repeat testing should be performed if clinically indicated.     Female and children under 18 years old 14-50 ng/L; Male 21-50 ng/L:  Consistent with possible cardiac damage and possible increased clinical   risk. Serial measurements may help to assess extent of myocardial damage.     >50 ng/L: Consistent with cardiac damage, increased clinical risk and  myocardial infarction. Serial  measurements may help assess extent of   myocardial damage.      NOTE: Children less than 1 year old may have higher baseline troponin   levels and results should be interpreted in conjunction with the overall   clinical context.     NOTE: Troponin I testing is performed using a different   testing methodology at [facility] than at other   [facility]. Direct result comparisons should only   be made within the same method.   TROPONIN SERIES- (INITIAL, 1 HR)    Narrative:     The following orders were created for panel order Troponin I Series, High Sensitivity (0, 1 HR).  Procedure                               Abnormality         Status                     ---------                               -----------         ------                     Troponin I, High Sensiti...[[id number]]  Normal              Final result               Troponin, High Sensitivi...[[id number]]  Normal              Final result                 Please view results for these tests on the individual orders.      CT angio chest for pulmonary embolism   Final Result   No acute pulmonary embolus through the segmental level.        Respiratory motion artifact limits evaluation of lung parenchyma.        Patchy asymmetric reticulonodular opacity posteriorly in the right   lung base may relate to scarring or bronchopneumonia, possibly   secondary to aspiration. No focal consolidation, pleural effusion, or   pneumothorax.        No substantial interval change in size or appearance of left lower   lobe mass measuring up to 3.4 cm in transaxial diameter compatible   with patient's known lung malignancy.        Moderate to severe emphysema. Similar appearance of cylindrical right   lower lobe cylindrical bronchiectasis, probably sequelae of chronic   infection.        Atherosclerosis, including coronary artery disease.        Mild compression of the T8 vertebral body, new from the prior exam of   05/02/2024, but otherwise  age-indeterminate. Correlate with any   recent trauma and physical exam findings to better assess its age. No   definite underlying lesion to suggest pathologic etiology.        Additional findings as discussed above.        MACRO:   None        Signed by: Sami Jeffers 6/17/2024 2:13 AM   Dictation workstation:   [id number]      XR chest 1 view   Final Result   1.  There is slight increased size of nodular opacity in the left   lower lobe corresponding to patient's known malignancy. Lungs   otherwise appear clear. No pleural effusion or pneumothorax.                  MACRO:   None        Signed by: Sami Jeffers 6/17/2024 12:01 AM   Dictation workstation:   [id number]           Performed at 3:25 AM        , HR of         ,     NSR, NAD, no sign of STEMI or NSTEMI, no Q wave or T wave abnormality noted.        Procedure  Procedures    Medications   morphine injection 4 mg (4 mg intravenous Given 6/16/24 2334)   methylPREDNISolone sod succinate (SOLU-Medrol) injection 125 mg (125 mg intravenous Given 6/16/24 2334)   ipratropium-albuteroL (Duo-Neb) 0.5-2.5 mg/3 mL nebulizer solution 3 mL (3 mL nebulization Given 6/16/24 2336)   iohexol (OMNIPaque) 350 mg iodine/mL solution 75 mL (75 mL intravenous Given 6/17/24 0124)        ED Course as of 06/17/24 0435 Mon Jun 17, 2024   0322 Reevaluation of the patient to be on baseline oxygen at 4 L maintaining 96%.  Labs overall unremarkable.  Will prescribe DuoNeb for home use in addition to pulmonology referral and steroid taper [ML]   0322 Patient has no chest pain, she feels well, agreeable to home treatment [ML]   0434 Prior to discharge we did ambulate the patient noted her O2 sats dropped to the low 80s, due to this as well as the treatment that we have attempted already she will be admitted for further evaluation and management [ML]      ED Course User Index  [ML] Marty R Lejeune, DO         Diagnoses as of 06/17/24 0435   COPD with acute exacerbation (Multi)        1. COPD  with acute exacerbation (Multi)  azithromycin (Zithromax Z-Obey) 250 mg tablet    predniSONE (Deltasone) 10 mg tablet    ipratropium-albuteroL (Duo-Neb) 0.5-2.5 mg/3 mL nebulizer solution      2. Chronic obstructive pulmonary disease, unspecified COPD type (Multi)  albuterol 90 mcg/actuation inhaler           DISPOSITION: Admit to [facility]    Marty LeJeune, DO  Internal & Emergency Medicine  3:25 AM   06/17/24

## 2024-06-17 NOTE — ED PROVIDER NOTES
External Records Reviewed: Discharge summary from 5/9/2024    [facility]  Abigail Aquino is a 79 y.o. female with a history of COPD on 4 L baseline oxygen, CAD, PE on Eliquis who is presenting with shortness of breath and chest pain.  Patient states she started develop shortness of breath yesterday.  Patient states it feels like her COPD and she is having an exacerbation.  Patient denies any increased oxygen use.  She notes today she started to develop pains along the sides of her chest that wraps around.  Patient denies ever having pain like this before.  She states the pain is very severe.  Patient notes a mild cough.  She denies any fevers or chills.  She denies any abdominal pain, vomiting, diarrhea.  Patient states the pain in her chest is a squeezing pain.    PMH  Past Medical History:   Diagnosis Date    Clotting disorder (Multi)     COPD (chronic obstructive pulmonary disease) (Multi)     Eyebrow laceration, right, sequela 10/04/2023    Fracture tibia/fibula, right, sequela 10/11/2021    Hypertension     Kidney stones 05/09/2017    Solitary pulmonary nodule 09/06/2023       Meds  Current Outpatient Medications   Medication Instructions    albuterol 90 mcg/actuation inhaler INHALE 2 PUFFS BY MOUTH FOUR TIMES A DAY AS NEEDED FOR SHORTNESS OF BREATH    budesonide-glycopyr-formoterol (BREZTRI) 160-9-4.8 mcg/actuation HFA aerosol inhaler 2 puffs, inhalation, 2 times daily RT    Eliquis 5 mg, oral, 2 times daily    furosemide (Lasix) 20 mg tablet TAKE 1 TABLET BY MOUTH ONCE DAILY    ipratropium-albuteroL (Duo-Neb) 0.5-2.5 mg/3 mL nebulizer solution 3 mL, nebulization, 4 times daily PRN    isosorbide mononitrate ER (IMDUR) 30 mg, oral, Daily    lidocaine (Lidoderm) 5 % patch Place 1 patch on the skin once daily. Leave on for 12 hours, remove for 12 hours    LORazepam (ATIVAN) 0.5 mg, oral, Once, Take 1-2 tablets by mouth about 30 minutes before your scan    metoprolol tartrate (LOPRESSOR) 50 mg, oral, 2 times daily  "   pravastatin (PRAVACHOL) 40 mg, oral, Daily       Allergies  Allergies   Allergen Reactions    Atorvastatin Myalgia    Oxycodone Nausea/vomiting        SHx  Social History     Tobacco Use    Smoking status: Former     Current packs/day: 0.00     Types: Cigarettes     Quit date: 2023     Years since quittin.8     Passive exposure: Past    Smokeless tobacco: Never   Vaping Use    Vaping status: Never Used   Substance Use Topics    Alcohol use: Never    Drug use: Never       ------------------------------------------------------------------------------------------------------------------------------------------    BP (!) 167/97 (BP Location: Left arm, Patient Position: Sitting)   Pulse 88   Temp 36.7 °C (98.1 °F)   Resp 18   Ht 1.651 m (5' 5\")   Wt 59 kg (130 lb)   SpO2 (!) 90%   BMI 21.63 kg/m²     Physical Exam  Constitutional:       General: She is not in acute distress.  HENT:      Head: Normocephalic and atraumatic.      Mouth/Throat:      Mouth: Mucous membranes are moist.   Eyes:      Extraocular Movements: Extraocular movements intact.      Conjunctiva/sclera: Conjunctivae normal.      Pupils: Pupils are equal, round, and reactive to light.   Cardiovascular:      Rate and Rhythm: Normal rate and regular rhythm.      Heart sounds: No murmur heard.     No gallop.   Pulmonary:      Effort: Tachypnea and accessory muscle usage present.      Breath sounds: Decreased breath sounds present. No wheezing.   Abdominal:      General: There is no distension.      Palpations: Abdomen is soft.      Tenderness: There is no abdominal tenderness. There is no guarding.   Musculoskeletal:         General: No swelling or signs of injury. Normal range of motion.   Skin:     General: Skin is warm and dry.      Findings: No lesion or rash.   Neurological:      General: No focal deficit present.      Mental Status: She is alert and oriented to person, place, and time. Mental status is at baseline.   Psychiatric:    "      Mood and Affect: Mood normal.         Judgment: Judgment normal.          ------------------------------------------------------------------------------------------------------------------------------------------    Medical Decision Makin-year-old female presenting with chest pain and shortness of breath.  Patient is 90% on 4 L nasal cannula on initial presentation.  She has increased work of breathing and decreased breath sounds bilaterally.  Patient shortness of breath is consistent with a COPD exacerbation.  Patient will be given steroids and DuoNeb treatments.  Patient's chest pain is new for her and is concerning for possible PE, ACS, pneumonia.  Will obtain lab work, chest x-ray, CT PE.  Patient will be given morphine for the pain.  EKG was reassuring without ST segment elevations or depressions.  Lab work showed a normal pH with a pCO2 of 74 which is likely where the patient chronically lives given her pH is normal.  Further lab work was fairly unremarkable including 2 negative troponins.  Chest x-ray showed no consolidations.  Patient remained stable and care was signed out to the oncoming provider pending CT PE and likely admission.    Independent EKG interpretation:  Sinus rhythm, rate 80 bpm, normal axis, QTc 450 ms, no ST segment elevations or depressions.  Not concerning for STEMI.  ED Course as of 24   0322 Reevaluation of the patient to be on baseline oxygen at 4 L maintaining 96%.  Labs overall unremarkable.  Will prescribe DuoNeb for home use in addition to pulmonology referral and steroid taper [ML]   0322 Patient has no chest pain, she feels well, agreeable to home treatment [ML]   0434 Prior to discharge we did ambulate the patient noted her O2 sats dropped to the low 80s, due to this as well as the treatment that we have attempted already she will be admitted for further evaluation and management [ML]      ED Course User Index  [ML] Marty R Lejeune, DO          Diagnoses as of 06/17/24 1858   COPD with acute exacerbation (Multi)       Kati Campbell MD  Emergency Medicine       Kati Campbell MD  06/17/24 1900

## 2024-06-17 NOTE — PROGRESS NOTES
06/17/24 1300   Discharge Planning   Living Arrangements Alone   Support Systems Friends/neighbors   Assistance Needed oxygen   Type of Residence Private residence   Who is requesting discharge planning? Provider   Home or Post Acute Services None   Patient expects to be discharged to: mobile home   Financial Resource Strain   How hard is it for you to pay for the very basics like food, housing, medical care, and heating? Not hard   Housing Stability   In the last 12 months, was there a time when you were not able to pay the mortgage or rent on time? N   In the last 12 months, how many places have you lived? 1   In the last 12 months, was there a time when you did not have a steady place to sleep or slept in a shelter (including now)? N   Transportation Needs   In the past 12 months, has lack of transportation kept you from medical appointments or from getting medications? no   In the past 12 months, has lack of transportation kept you from meetings, work, or from getting things needed for daily living? No     Met with patient at bedside to discuss discharge planning.   Introduced self and role.  Pt lives alone in a mobile home.  Pt is independent and drives herself to appointments.  Pt confirms her PCP is Vikki Marquez.  Pt is on 4L NC at baseline ( via [facility]).  Pt plan is to discharge back to mobile home when medically clear.  TCC team to follow if any needs shall arise.

## 2024-06-17 NOTE — PROGRESS NOTES
Abigail Aquino is a 79 y.o. female on day 0 of admission presenting with COPD with acute exacerbation (Multi).      Subjective   Abigail Aquino is a 79 y.o. female with past medical history of O2 dependent COPD/emphysema, coronary artery disease, adenocarcinoma of the left lung, remote pulmonary emboli, hypertension, dyslipidemia presented 6/16/24 with increasing shortness of breath.  Patient states that she has intermittent episodes where she just will desat into the 70s at home with minimal exertion.  She is able to rest and recover.  She states that the last day and a half she has noted wheezing and increasing shortness of breath and less endurance.  She denies having any chest pain with this she denies having any fevers or chills.  Patient does have a cough.  In the emergency room patient on her 4 L nasal cannula satting sats in the mid 90s.  Patient will drop down into the low 80s with exertion.  BMP has a potassium of 3.2 otherwise is unremarkable.  Patient's BNP is 226 troponin of 7 with repeat of 7. Venous gas shows pH of 7.36 with a pCO2 of 74 which is patient's baseline.  Chest x-ray shows slight increased size of nodular opacity in the left lower lobe corresponding to patient's known malignancy otherwise her lungs are clear.  Given patient's hypoxia patient had a CT angiogram obtained that shows no significant change with patient having known pulmonary mass now is 3.4 cm.  Patient is being admitted to the hospital due to her inability to ambulate without significant desaturation.     6/17/2024: No acute events overnight. Vitals stable, 89% on 6L. No AM labs. Patient reports pain with deep breath, mostly on the left side, known malignancy in LLL, suspect could be pleurisy from the malignancy itself. We discussed her malignancy, she reports she sees oncologist Dr. Castañeda and she has recently decided she does not want any treatment of her cancer but has yet to relay this to her primary team. She  reports wanting pain and dyspnea symptom control. Will ask for palliaitve care consult. Start PRN morphine, lidocaine patch, already on steroids. She endorses DNR-CCA/DNI. Does not have [place] DNR, LW, or POA paperwork.          Review of Systems   Constitutional:  Negative for appetite change, chills, diaphoresis, fatigue and fever.   HENT:  Negative for congestion, ear pain, facial swelling, hearing loss, nosebleeds, sore throat, tinnitus and trouble swallowing.    Eyes:  Negative for pain.   Respiratory:  Positive for shortness of breath. Negative for cough, chest tightness and wheezing.    Cardiovascular:  Positive for chest pain (Pleuritic). Negative for palpitations and leg swelling.   Gastrointestinal:  Negative for abdominal pain, blood in stool, constipation, diarrhea, nausea and vomiting.   Genitourinary:  Negative for dysuria, flank pain, frequency, hematuria and urgency.   Musculoskeletal:  Negative for back pain and joint swelling.   Skin:  Negative for rash and wound.   Neurological:  Negative for dizziness, syncope, weakness, light-headedness, numbness and headaches.   Hematological:  Does not bruise/bleed easily.   Psychiatric/Behavioral:  Negative for behavioral problems, hallucinations and suicidal ideas.           Objective     Last Recorded Vitals  /82 (BP Location: Right arm, Patient Position: Sitting)   Pulse 94   Temp 37 °C (98.6 °F) (Temporal)   Resp 17   Wt 59 kg (130 lb)   SpO2 97%     Image Results  Electrocardiogram, 12-lead PRN ACS symptoms    Result Date: 6/17/2024  Sinus rhythm Multiple ventricular premature complexes Biatrial enlargement Probable anterior infarct, old    CT angio chest for pulmonary embolism    Result Date: 6/17/2024  Interpreted By:  Sami Jeffers, STUDY: CT ANGIO CHEST FOR PULMONARY EMBOLISM;  6/17/2024 1:23 am   INDICATION: Signs/Symptoms:CP SOB.   COMPARISON: 05/02/2024. Correlation also made to chest radiography of 06/16/2024.   ACCESSION NUMBER(S):  [id number]   ORDERING CLINICIAN: JOSE GUADALUPE PORTILLO   TECHNIQUE: Helical data acquisition of the chest was obtained after intravenous administration of 75 ML Omnipaque 350, as per PE protocol. Images were reformatted in coronal and sagittal planes. Axial and coronal maximum intensity projection (MIP) images were created and reviewed.   FINDINGS: POTENTIAL LIMITATIONS OF THE STUDY: Respiratory motion.   HEART AND VESSELS: No acute pulmonary embolus through the segmental level. Main pulmonary artery and its branches are normal in caliber.   The thoracic aorta normal in course and caliber.There is moderate atherosclerosis present, including calcified and noncalcified plaques. Moderate coronary artery calcifications are seen. Please note,the study is not optimized for evaluation of coronary arteries.   The cardiac chambers are not enlarged.   There is no pericardial effusion seen.   MEDIASTINUM AND SHELIA, LOWER NECK AND AXILLA: The visualized thyroid gland is within normal limits. No evidence of thoracic lymphadenopathy by CT criteria. Esophagus appears within normal limits as seen.   LUNGS AND AIRWAYS: The trachea and central airways are patent. No endobronchial lesion is seen.   Respiratory motion artifact limits evaluation of lung parenchyma.   Patchy asymmetric reticulonodular opacity posteriorly in the right lung base may relate to scarring or bronchopneumonia, possibly secondary to aspiration. No focal consolidation, pleural effusion, or pneumothorax.   No substantial interval change in size or appearance of left lower lobe mass measuring up to 3.4 cm in transaxial diameter compatible with patient's known lung malignancy.   Moderate to severe emphysema. Similar appearance of cylindrical right lower lobe cylindrical bronchiectasis, probably sequelae of chronic infection.       UPPER ABDOMEN: The visualized subdiaphragmatic structures demonstrate no remarkable findings.       CHEST WALL AND OSSEOUS STRUCTURES: Chest  wall is within normal limits. No acute osseous pathology.There are no suspicious osseous lesions.Mild compression of the T8 vertebral body, new from the prior exam, but otherwise age-indeterminate. Similar appearance of burst fracture deformity of the L1 vertebral body with mild-to-moderate height loss and retropulsion of the superior endplate with associated mild-to-moderate canal narrowing.       No acute pulmonary embolus through the segmental level.   Respiratory motion artifact limits evaluation of lung parenchyma.   Patchy asymmetric reticulonodular opacity posteriorly in the right lung base may relate to scarring or bronchopneumonia, possibly secondary to aspiration. No focal consolidation, pleural effusion, or pneumothorax.   No substantial interval change in size or appearance of left lower lobe mass measuring up to 3.4 cm in transaxial diameter compatible with patient's known lung malignancy.   Moderate to severe emphysema. Similar appearance of cylindrical right lower lobe cylindrical bronchiectasis, probably sequelae of chronic infection.   Atherosclerosis, including coronary artery disease.   Mild compression of the T8 vertebral body, new from the prior exam of 05/02/2024, but otherwise age-indeterminate. Correlate with any recent trauma and physical exam findings to better assess its age. No definite underlying lesion to suggest pathologic etiology.   Additional findings as discussed above.   MACRO: None   Signed by: Sami Jeffers 6/17/2024 2:13 AM Dictation workstation:   [id number]    XR chest 1 view    Result Date: 6/17/2024  Interpreted By:  Smai Jeffers, STUDY: XR CHEST 1 VIEW;  6/16/2024 11:33 pm   INDICATION: Signs/Symptoms:sob.   COMPARISON: 05/02/2024.   ACCESSION NUMBER(S): [id number]   ORDERING CLINICIAN: JOSE GUADALUPE PORTILLO   FINDINGS: AP radiograph of the chest was provided.       CARDIOMEDIASTINAL SILHOUETTE: Cardiomediastinal silhouette is normal in size and configuration.   LUNGS: There is  slight increased size of nodular opacity in the left lower lobe corresponding to patient's known malignancy. Lungs otherwise appear clear. No pleural effusion or pneumothorax.   ABDOMEN: No remarkable upper abdominal findings.   BONES: No acute osseous changes.       1.  There is slight increased size of nodular opacity in the left lower lobe corresponding to patient's known malignancy. Lungs otherwise appear clear. No pleural effusion or pneumothorax.       MACRO: None   Signed by: Sami Jeffers 6/17/2024 12:01 AM Dictation workstation:   [id number]       Lab Results  Results for orders placed or performed during the hospital encounter of 06/16/24 (from the past 24 hour(s))   Electrocardiogram, 12-lead PRN ACS symptoms   Result Value Ref Range    Ventricular Rate 80 BPM    Atrial Rate 81 BPM    OH Interval 135 ms    QRS Duration 98 ms    QT Interval 390 ms    QTC Calculation(Bazett) 450 ms    P Axis 77 degrees    R Axis 50 degrees    T Axis 48 degrees    QRS Count 13 beats    Q Onset 249 ms    T Offset 444 ms    QTC Fredericia 429 ms   CBC and Auto Differential   Result Value Ref Range    WBC 10.3 4.4 - 11.3 x10*3/uL    nRBC 0.0 0.0 - 0.0 /100 WBCs    RBC 4.57 4.00 - 5.20 x10*6/uL    Hemoglobin 14.3 12.0 - 16.0 g/dL    Hematocrit 44.0 36.0 - 46.0 %    MCV 96 80 - 100 fL    MCH 31.3 26.0 - 34.0 pg    MCHC 32.5 32.0 - 36.0 g/dL    RDW 12.8 11.5 - 14.5 %    Platelets 247 150 - 450 x10*3/uL    Neutrophils % 72.8 40.0 - 80.0 %    Immature Granulocytes %, Automated 0.6 0.0 - 0.9 %    Lymphocytes % 12.0 13.0 - 44.0 %    Monocytes % 10.4 2.0 - 10.0 %    Eosinophils % 3.4 0.0 - 6.0 %    Basophils % 0.8 0.0 - 2.0 %    Neutrophils Absolute 7.52 (H) 1.60 - 5.50 x10*3/uL    Immature Granulocytes Absolute, Automated 0.06 0.00 - 0.50 x10*3/uL    Lymphocytes Absolute 1.24 0.80 - 3.00 x10*3/uL    Monocytes Absolute 1.07 (H) 0.05 - 0.80 x10*3/uL    Eosinophils Absolute 0.35 0.00 - 0.40 x10*3/uL    Basophils Absolute 0.08 0.00 - 0.10  x10*3/uL   Comprehensive metabolic panel   Result Value Ref Range    Glucose 115 (H) 74 - 99 mg/dL    Sodium 143 136 - 145 mmol/L    Potassium 3.2 (L) 3.5 - 5.3 mmol/L    Chloride 97 (L) 98 - 107 mmol/L    Bicarbonate 38 (H) 21 - 32 mmol/L    Anion Gap 11 mmol/L    Urea Nitrogen 8 6 - 23 mg/dL    Creatinine 0.56 0.50 - 1.05 mg/dL    eGFR >90 >60 mL/min/1.73m*2    Calcium 9.7 8.6 - 10.3 mg/dL    Albumin 4.1 3.4 - 5.0 g/dL    Alkaline Phosphatase 74 33 - 136 U/L    Total Protein 6.9 6.4 - 8.2 g/dL    AST 15 9 - 39 U/L    Bilirubin, Total 0.5 0.0 - 1.2 mg/dL    ALT 11 7 - 45 U/L   Protime-INR   Result Value Ref Range    Protime 12.9 (H) 9.8 - 12.8 seconds    INR 1.1 0.9 - 1.1   aPTT   Result Value Ref Range    aPTT 35 27 - 38 seconds   B-Type Natriuretic Peptide   Result Value Ref Range     (H) 0 - 99 pg/mL   Lactate   Result Value Ref Range    Lactate 0.7 0.4 - 2.0 mmol/L   Blood Gas Venous Full Panel   Result Value Ref Range    POCT pH, Venous 7.36 7.33 - 7.43 pH    POCT pCO2, Venous 74 (HH) 41 - 51 mm Hg    POCT pO2, Venous 33 (L) 35 - 45 mm Hg    POCT SO2, Venous 55 45 - 75 %    POCT Oxy Hemoglobin, Venous 53.6 45.0 - 75.0 %    POCT Hematocrit Calculated, Venous 43.0 36.0 - 46.0 %    POCT Sodium, Venous 139 136 - 145 mmol/L    POCT Potassium, Venous 3.2 (L) 3.5 - 5.3 mmol/L    POCT Chloride, Venous 98 98 - 107 mmol/L    POCT Ionized Calicum, Venous 1.18 1.10 - 1.33 mmol/L    POCT Glucose, Venous 122 (H) 74 - 99 mg/dL    POCT Lactate, Venous 1.0 0.4 - 2.0 mmol/L    POCT Base Excess, Venous 12.7 (H) -2.0 - 3.0 mmol/L    POCT HCO3 Calculated, Venous 41.8 (H) 22.0 - 26.0 mmol/L    POCT Hemoglobin, Venous 14.4 12.0 - 16.0 g/dL    POCT Anion Gap, Venous 2.0 (L) 10.0 - 25.0 mmol/L    Patient Temperature 37.0 degrees Celsius    FiO2 40 %   Troponin I, High Sensitivity, Initial   Result Value Ref Range    Troponin I, High Sensitivity 7 0 - 13 ng/L   Troponin, High Sensitivity, 1 Hour   Result Value Ref Range     Troponin I, High Sensitivity 7 0 - 13 ng/L        Medications  Scheduled medications:  apixaban, 5 mg, oral, BID  doxycycline, 100 mg, intravenous, q12h  tiotropium, 2 puff, inhalation, Daily   And  fluticasone furoate-vilanteroL, 1 puff, inhalation, Daily  [Held by provider] furosemide, 20 mg, oral, Daily  guaiFENesin, 1,200 mg, oral, BID  ipratropium-albuteroL, 3 mL, nebulization, 4x daily  isosorbide mononitrate ER, 30 mg, oral, Daily  lidocaine, 1 patch, transdermal, Daily  methylPREDNISolone sodium succinate (PF), 40 mg, intravenous, q12h  metoprolol tartrate, 50 mg, oral, BID  oxygen, , inhalation, Continuous - Inhalation  pantoprazole, 40 mg, oral, Daily before breakfast  polyethylene glycol, 17 g, oral, Daily  pravastatin, 40 mg, oral, Daily      Continuous medications:  sodium chloride 0.9%, 75 mL/hr, Last Rate: 75 mL/hr (06/17/24 0828)      PRN medications:  PRN medications: acetaminophen, acetaminophen, benzocaine-menthol, bisacodyl, hydrOXYzine HCL, ipratropium-albuteroL, morphine, morphine     Physical Exam  Constitutional:       General: She is not in acute distress.     Appearance: Normal appearance.      Comments: Elderly, frail   HENT:      Head: Normocephalic and atraumatic.      Right Ear: External ear normal.      Left Ear: External ear normal.      Nose: Nose normal.      Mouth/Throat:      Mouth: Mucous membranes are moist.      Pharynx: Oropharynx is clear.   Eyes:      Extraocular Movements: Extraocular movements intact.      Conjunctiva/sclera: Conjunctivae normal.      Pupils: Pupils are equal, round, and reactive to light.   Cardiovascular:      Rate and Rhythm: Normal rate and regular rhythm.      Pulses: Normal pulses.      Heart sounds: Normal heart sounds.   Pulmonary:      Effort: Pulmonary effort is normal. No respiratory distress.      Breath sounds: No wheezing, rhonchi or rales.      Comments: Breath sounds diminished bilaterally  Appears to have pain with deep breaths  Abdominal:       General: Bowel sounds are normal.      Palpations: Abdomen is soft.      Tenderness: There is no abdominal tenderness. There is no right CVA tenderness, left CVA tenderness, guarding or rebound.   Musculoskeletal:         General: No swelling. Normal range of motion.      Cervical back: Normal range of motion and neck supple.   Skin:     General: Skin is warm and dry.      Capillary Refill: Capillary refill takes less than 2 seconds.      Findings: No lesion or rash.   Neurological:      General: No focal deficit present.      Mental Status: She is alert and oriented to person, place, and time. Mental status is at baseline.   Psychiatric:         Mood and Affect: Mood normal.         Behavior: Behavior normal.                  Code Status  DNR and No Intubation     Assessment/Plan      Acute COPD exacerbation  Patient is placed on Solu-Medrol beta agonist O2 support and doxycycline  Patient has minimal reserves at baseline  Patient is chronically O2 dependent requiring 3 to 4 L continuously, with ambulation patient desats this is no different than patient has been doing for the past 6 months   Pulmonary consultation will be obtained to try and maximize patient's ambulatory and exertional capabilities and explained to the patient's limitations that will not improve  Palliative care consultation     Adenocarcinoma of the lung  Patient has diagnosis from February but has yet to have any treatment- she has now decided she does not want any treatment  Palliative care consultation  DNR-CCA/DNI- could be good candidate for hospice as she only wishes for comfort measures  Suspect her pain with deep breath is pleurisy related to her malignancy     Remote history of pulmonary emboli  Patient chronically anticoagulated with Eliquis  No PE on CTA on arrival      Coronary artery disease  Pain is pleuritic  Low suspicion for ACS  Cardiac enzymes negative     Hypertension  Stable on home meds     Social issues  Patient states  that she has a neighbor who is helping to take care of her.  She has had different services out to assist her but they cannot provide services as there are not enough staffing  Patient has 2 children that live out of state  Patient is adamant that she does not want to go to a nursing facility    DVT ppx: Eliquis       Please see orders for more complete plan    Reyna Honeycutt PA-C

## 2024-06-17 NOTE — CONSULTS
"    Reason For Consult  \"COPD exacerbation, underlying malignancy yet to have treatment\"    History Of Present Illness  Abigail Aquino is a 79 y.o. female presenting with shortness of breath.  Patient only complains of shortness of breath with activity.  She feels weak and tired.  She feels overwhelmed and the inability to accomplish her chores and travels for things such as getting her hearing aids fixed and her eyeglasses repaired.  She has had no hemoptysis.  She has pain over the left side of the chest radiating around the chest, similar to that she had with bouts of pleurisy in the past.  She has a long tobacco use history and uses supplemental oxygen.  She has a history of lung cancer and is not interested in much aggressive treatment telling me that she prefers to have quality.  According to the notes she was admitted due to inability to ambulate without desaturation.  According to the patient she was short of breath and having pain as well.  She feels much better now that she has received steroids and pain medication.     Past Medical History  Past Medical History:   Diagnosis Date    Clotting disorder (Multi)     COPD (chronic obstructive pulmonary disease) (Multi)     Eyebrow laceration, right, sequela 10/04/2023    Fracture tibia/fibula, right, sequela 10/11/2021    Hypertension     Kidney stones 2017    Solitary pulmonary nodule 2023       Surgical History  Past Surgical History:   Procedure Laterality Date    APPENDECTOMY      CT GUIDED PERCUTANEOUS BIOPSY LUNG  2023    CT GUIDED PERCUTANEOUS BIOPSY LUNG 2023 POR CT    TONSILLECTOMY          Social History  Social History     Tobacco Use    Smoking status: Former     Current packs/day: 0.00     Types: Cigarettes     Quit date: 2023     Years since quittin.8     Passive exposure: Past    Smokeless tobacco: Never   Vaping Use    Vaping status: Never Used   Substance Use Topics    Alcohol use: Never    Drug use: Never " "      Family History  Family History   Problem Relation Name Age of Onset    Cancer Daughter            Allergies  Atorvastatin and Oxycodone    Review of Systems   Constitutional:  Positive for fatigue. Negative for fever.   HENT:  Negative for nosebleeds.    Respiratory:  Positive for shortness of breath.    Cardiovascular:  Positive for chest pain.   Gastrointestinal:  Negative for abdominal pain.   Neurological:  Negative for headaches.   All other systems reviewed and are negative.       Physical Exam  Vitals reviewed.   Constitutional:       General: She is not in acute distress.     Appearance: She is not toxic-appearing.   HENT:      Head: Normocephalic and atraumatic.      Mouth/Throat:      Pharynx: No oropharyngeal exudate.   Eyes:      General: No scleral icterus.  Cardiovascular:      Rate and Rhythm: Normal rate and regular rhythm.      Heart sounds: No murmur heard.  Pulmonary:      Comments: No chest wall tenderness.  No percussion dullness.  Left lateral chest wall with decreased air entry.  Left posterior chest with decreased air entry.  No wheezing or significant use of accessory muscles.  Abdominal:      General: There is no distension.      Palpations: Abdomen is soft.   Musculoskeletal:      Cervical back: Neck supple. No rigidity.      Right lower leg: No edema.      Left lower leg: No edema.   Skin:     Findings: No rash.   Neurological:      General: No focal deficit present.      Mental Status: She is alert. Mental status is at baseline.          Vital Signs  Blood pressure 128/82, pulse 94, temperature 37 °C (98.6 °F), temperature source Temporal, resp. rate 17, height 1.651 m (5' 5\"), weight 59 kg (130 lb), SpO2 97%.  Oxygen Therapy  SpO2: 97 %  Medical Gas Therapy: Supplemental oxygen  O2 Delivery Method: Nasal cannula  FiO2 (%): 44 %    Relevant Results  CT angio chest for pulmonary embolism 06/17/2024    Narrative  Interpreted By:  Sami Jeffers,  STUDY:  CT ANGIO CHEST FOR PULMONARY " EMBOLISM;  6/17/2024 1:23 am    INDICATION:  Signs/Symptoms:CP SOB.    COMPARISON:  05/02/2024. Correlation also made to chest radiography of 06/16/2024.    ACCESSION NUMBER(S):  [id number]    ORDERING CLINICIAN:  JOSE GUADALUPE PORTILLO    TECHNIQUE:  Helical data acquisition of the chest was obtained after intravenous  administration of 75 ML Omnipaque 350, as per PE protocol. Images  were reformatted in coronal and sagittal planes. Axial and coronal  maximum intensity projection (MIP) images were created and reviewed.    FINDINGS:  POTENTIAL LIMITATIONS OF THE STUDY: Respiratory motion.    HEART AND VESSELS:  No acute pulmonary embolus through the segmental level.  Main pulmonary artery and its branches are normal in caliber.    The thoracic aorta normal in course and caliber.There is moderate  atherosclerosis present, including calcified and noncalcified  plaques. Moderate coronary artery calcifications are seen. Please  note,the study is not optimized for evaluation of coronary arteries.    The cardiac chambers are not enlarged.    There is no pericardial effusion seen.    MEDIASTINUM AND SHELIA, LOWER NECK AND AXILLA:  The visualized thyroid gland is within normal limits.  No evidence of thoracic lymphadenopathy by CT criteria.  Esophagus appears within normal limits as seen.    LUNGS AND AIRWAYS:  The trachea and central airways are patent. No endobronchial lesion  is seen.    Respiratory motion artifact limits evaluation of lung parenchyma.    Patchy asymmetric reticulonodular opacity posteriorly in the right  lung base may relate to scarring or bronchopneumonia, possibly  secondary to aspiration. No focal consolidation, pleural effusion, or  pneumothorax.    No substantial interval change in size or appearance of left lower  lobe mass measuring up to 3.4 cm in transaxial diameter compatible  with patient's known lung malignancy.    Moderate to severe emphysema. Similar appearance of cylindrical right  lower lobe  cylindrical bronchiectasis, probably sequelae of chronic  infection.        UPPER ABDOMEN:  The visualized subdiaphragmatic structures demonstrate no remarkable  findings.        CHEST WALL AND OSSEOUS STRUCTURES:  Chest wall is within normal limits.  No acute osseous pathology.There are no suspicious osseous  lesions.Mild compression of the T8 vertebral body, new from the prior  exam, but otherwise age-indeterminate. Similar appearance of burst  fracture deformity of the L1 vertebral body with mild-to-moderate  height loss and retropulsion of the superior endplate with associated  mild-to-moderate canal narrowing.    Impression  No acute pulmonary embolus through the segmental level.    Respiratory motion artifact limits evaluation of lung parenchyma.    Patchy asymmetric reticulonodular opacity posteriorly in the right  lung base may relate to scarring or bronchopneumonia, possibly  secondary to aspiration. No focal consolidation, pleural effusion, or  pneumothorax.    No substantial interval change in size or appearance of left lower  lobe mass measuring up to 3.4 cm in transaxial diameter compatible  with patient's known lung malignancy.    Moderate to severe emphysema. Similar appearance of cylindrical right  lower lobe cylindrical bronchiectasis, probably sequelae of chronic  infection.    Atherosclerosis, including coronary artery disease.    Mild compression of the T8 vertebral body, new from the prior exam of  05/02/2024, but otherwise age-indeterminate. Correlate with any  recent trauma and physical exam findings to better assess its age. No  definite underlying lesion to suggest pathologic etiology.    Additional findings as discussed above.    MACRO:  None    Signed by: Sami Jeffers 6/17/2024 2:13 AM  Dictation workstation:   [id number]         Assessment/Plan   COPD; primarily emphysema  Left lower lobe lung cancer--possibly the cause of patient's chest pain if there is pleural involvement/puckering or  metastatic lesions to the pleura not obvious on CT.  Bronchiectasis, right lower lobe.  Chronic.  Hypoxia.  Chronic.  Supplemental oxygen is providing adequate saturations at rest, which is how it should be used.  This patient is not interested in very aggressive treatment.  Seems more interested in comfort measures.  I recommended she consider receiving chest wall radiation in case that it is indeed the cancer that is causing her pain.    Her oximetry should basically be ignored.  Supplemental oxygen at reasonable flow rates can continue chronically.  Patient should be treated symptomatically and I agree with palliative care.    Gladis Laguna

## 2024-06-17 NOTE — PROGRESS NOTES
Occupational Therapy  Evaluation    Patient Name: Abigail Aquino  MRN: [id number]  Today's Date: 6/17/2024  Time Calculation  Start Time: 0931  Stop Time: 1005  Time Calculation (min): 34 min    Current Problem:   1. COPD with acute exacerbation (Multi)    2. Chronic obstructive pulmonary disease, unspecified COPD type (Multi)        OT order: OT eval and treat   Referred by: Koffi  Reason for referral: ADLs, safety assessment  Past medical history related to rehab:  has a past medical history of Clotting disorder (Multi), COPD (chronic obstructive pulmonary disease) (Multi), Eyebrow laceration, right, sequela (10/04/2023), Fracture tibia/fibula, right, sequela (10/11/2021), Hypertension, Kidney stones (05/09/2017), and Solitary pulmonary nodule (09/06/2023).    She has no past medical history of Diabetes mellitus (Multi).     Precautions:   Hearing/Visual Limitations: [facility]  Medical Precautions: Fall precautions, Oxygen therapy device and L/min  Precautions Comment: 6L NC spO2    ASSESSMENT  OT Assessment: OT eval completed. The patient is functioning below baseline for ADLs and mobility. can benefit from continued OT. Pt with Decreased ADL status, Decreased upper extremity strength, Decreased safe judgment during ADL, Decreased cognition, Decreased endurance, Decreased functional mobility, Decreased gross motor control, Decreased IADLs  Prognosis:    Barriers to discharge: Decreased caregiver support  Tolerance:      PLAN  Frequency: 3 times per week  Treatment Interventions: ADL retraining, Functional transfer training, UE strengthening/ROM, Endurance training, Cognitive reorientation, Patient/family training, Neuromuscular reeducation  Discharge Recommendations: Low intensity level of continued care  OT OK to discharge: Yes    GENERAL VISIT INFORMATION   Start of session communication:    End of session communication: Bedside nurse  Family/caregiver present: No  Caregiver feedback:    Co-Treatment:   (no)  Reason for co-treatment:     Position Pt Received:  Up in chair, Alarm off, not on at start of session  End of session position: Up in chair, Alarm off, not on at start of session    SUBJECTIVE  Home Living:  Type of Home: Mobile home  Lives With: Alone  Home Adaptive Equipment: Walker rolling or standard, Cane  Home Layout: One level  Home Access: Stairs to enter with rails  Entrance Stairs-Rails: Both  Entrance Stairs-Number of Steps: 4  Home Living Comments: resides alone, neighbors help as needed. wears 3-4L NC spO2 at home     Prior Level of Function:  Receives Help From: Neighbor  ADL Assistance: Independent (increased time to complete. pt reports it takes her about)  Homemaking Assistance: Needs assistance  Meal Prep:  (pt has meals delivered and makes cold sandwiches)  Cleaning:  (neighbor assists 1 day ever 2 weeks with all cleaning and other housework.)  Driving/Transportation:  (pt drives but typically takes someone with her when she goes out shopping/store)      Pain:  Assessment: 0-10  Score:  (no numerical rating given)  Type: Acute pain, Chronic pain  Location:  (LEFT side and back)  Interventions: Repositioned, Rest  Response to pain interventions:      OBJECTIVE  Vital Signs:  SpO2: (!) 87 % (dropped to 87% briefly with activity then recovered to 90-91% with rest)    Cognition:  Overall Cognitive Status: Within Functional Limits (except anxious, overwhelms easily)  Arousal/Alertness: Generalized responses  Orientation Level: Oriented X4  Cognition Comments: pt anxious and labile affect during session. pt expressed frustration over declining health and lack of support/help at home. expressed having at least 5 hopsitalizations recently has been difficult and taken a toll on her wellbeing.  Processing Speed: Delayed             Current ADL function:   EATING:  Stand by (anticipate)     GROOMING: Stand by (anticipate)     BATHING: Maximal (anticipate)     UB DRESSING: Minimal (anticipate)     LB  DRESSING: Moderate (anticipate)     TOILETING: Minimal (anticipate)    ADL comments:       Activity Tolerance:  Endurance: Decreased tolerance for upright activites    Bed Mobility/Transfers:   Bed Mobility  Bed Mobility: No (pt out of bed and remained up during session)  Transfers  Transfer: Yes  Transfer 1  Transfer From 1: Sit to  Transfer to 1: Stand  Technique 1: Stand to sit  Transfer Level of Assistance 1: Minimum assistance    Ambulation/Gait Training:  Functional Mobility  Functional Mobility Performed:  (pt performed functional mobility ~10ft in room with CGAx1.)    Sitting Balance:  Static Sitting Balance  Static Sitting-Level of Assistance: Distant supervision  Dynamic Sitting Balance  Dynamic Sitting-Comments: supv    Standing Balance:  Static Standing Balance  Static Standing-Level of Assistance: Contact guard  Dynamic Standing Balance  Dynamic Standing-Comments: CGA    Vision: Vision - Basic Assessment  Current Vision: No visual deficits   and      Sensation:  Light Touch: No apparent deficits    Strength:  Strength Comments: BUE grossly 3+/5 to 4-/5    Perception:  Inattention/Neglect: Appears intact    Coordination:  Movements are Fluid and Coordinated: Yes     Hand Function:  Hand Function  Gross Grasp: Functional    Extremities: RUE   RUE : Within Functional Limits and LUE   LUE: Within Functional Limits    Outcome Measures: [facility] Daily Activity  Putting on and taking off regular lower body clothing: A lot  Bathing (including washing, rinsing, drying): A lot  Putting on and taking off regular upper body clothing: A little  Toileting, which includes using toilet, bedpan or urinal: A little  Taking care of personal grooming such as brushing teeth: A little  Eating Meals: A little  Daily Activity - Total Score: 16                    EDUCATION:     Education Documentation  ADL Training, taught by Soheila Sr OT at 6/17/2024  2:19 PM.  Learner: Patient  Readiness: Acceptance  Method:  Explanation  Response: Needs Reinforcement    Education Comments  No comments found.        Goals:   Encounter Problems       Encounter Problems (Active)       ADLs       Patient will tolerate 25 minutes of ADL/activity while maintaining spO2 >89% and with rest breaks as needed. (Progressing)       Start:  06/17/24    Expected End:  07/01/24               BALANCE       Patient will tolerate standing for 10 minutes to modified independent level of assistance with least restrictive device in order to improve functional activity tolerance for ADL tasks. (Progressing)       Start:  06/17/24    Expected End:  07/01/24               VISION       Patient will navigate environments with high visual stimuli safely Without loss of balance and Attending to obstacles with modified independent level of assistance. (Progressing)       Start:  06/17/24    Expected End:  07/01/24

## 2024-06-17 NOTE — SIGNIFICANT EVENT
Palliative care meeting scheduled for 6/18 at 9:30 am with patient.    Pt returned your call.  She said ER wants her to see cardiology and pulmonology and would like your thoughts.

## 2024-06-17 NOTE — TELEPHONE ENCOUNTER
Medication was not on med list, reviewed med reconciliation dispensing history and has been getting sinc December, though not always filled on time. One month sent in. Has follow up with pulmonology on 6/20 and needs to make sure she keeps that appointment

## 2024-06-17 NOTE — PROGRESS NOTES
Abigail Aquino is a 79 y.o. female admitted for COPD with acute exacerbation (Multi). Pharmacy reviewed the patient's [id number] medications and allergies for accuracy.    The list below reflects the PTA list prior to pharmacy medication history. A summary a changes to the PTA medication list has been listed below. Please review each medication in order reconciliation for additional clarification and justification.    Source of information:  Pharmacy    Medications added:    Medications modified:    Medications to be removed:  Lorazepam 0.5mg  Cephalexin 500 mg    Medications of concern:      Prior to Admission Medications   Prescriptions Last Dose Informant Patient Reported? Taking?   Eliquis 5 mg tablet   Yes No   Sig: Take 1 tablet (5 mg) by mouth 2 times a day.   LORazepam (Ativan) 0.5 mg tablet   No No   Sig: Take 1 tablet (0.5 mg) by mouth 1 time for 1 dose. Take 1-2 tablets by mouth about 30 minutes before your scan   albuterol 90 mcg/actuation inhaler   No No   Sig: INHALE 2 PUFFS BY MOUTH FOUR TIMES A DAY AS NEEDED FOR SHORTNESS OF BREATH   albuterol 90 mcg/actuation inhaler   No No   Sig: INHALE 2 PUFFS BY MOUTH FOUR TIMES A DAY AS NEEDED FOR SHORTNESS OF BREATH   budesonide-glycopyr-formoterol (BREZTRI) 160-9-4.8 mcg/actuation HFA aerosol inhaler   No No   Sig: Inhale 2 puffs 2 times a day.   furosemide (Lasix) 20 mg tablet   No No   Sig: TAKE 1 TABLET BY MOUTH ONCE DAILY   ipratropium-albuteroL (Duo-Neb) 0.5-2.5 mg/3 mL nebulizer solution   No No   Sig: Take 3 mL by nebulization 4 times a day as needed for wheezing or shortness of breath.   isosorbide mononitrate ER (Imdur) 30 mg 24 hr tablet   Yes No   Sig: Take 1 tablet (30 mg) by mouth once daily.   lidocaine (Lidoderm) 5 % patch   No No   Sig: Place 1 patch on the skin once daily. Leave on for 12 hours, remove for 12 hours   metoprolol tartrate (Lopressor) 50 mg tablet   Yes No   Sig: Take 1 tablet by mouth 2 times a day.   pravastatin  (Pravachol) 40 mg tablet   Yes No   Sig: Take 1 tablet (40 mg) by mouth once daily.      Facility-Administered Medications: None       Olga Kraft

## 2024-06-18 LAB
ANION GAP SERPL CALC-SCNC: 11 MMOL/L (ref 10–20)
BUN SERPL-MCNC: 10 MG/DL (ref 6–23)
CALCIUM SERPL-MCNC: 8.9 MG/DL (ref 8.6–10.3)
CHLORIDE SERPL-SCNC: 100 MMOL/L (ref 98–107)
CO2 SERPL-SCNC: 33 MMOL/L (ref 21–32)
CREAT SERPL-MCNC: 0.48 MG/DL (ref 0.5–1.05)
EGFRCR SERPLBLD CKD-EPI 2021: >90 ML/MIN/1.73M*2
ERYTHROCYTE [DISTWIDTH] IN BLOOD BY AUTOMATED COUNT: 13.2 % (ref 11.5–14.5)
GLUCOSE SERPL-MCNC: 139 MG/DL (ref 74–99)
HCT VFR BLD AUTO: 41.2 % (ref 36–46)
HGB BLD-MCNC: 13.4 G/DL (ref 12–16)
MAGNESIUM SERPL-MCNC: 1.79 MG/DL (ref 1.6–2.4)
MCH RBC QN AUTO: 31.5 PG (ref 26–34)
MCHC RBC AUTO-ENTMCNC: 32.5 G/DL (ref 32–36)
MCV RBC AUTO: 97 FL (ref 80–100)
NRBC BLD-RTO: 0 /100 WBCS (ref 0–0)
PLATELET # BLD AUTO: 265 X10*3/UL (ref 150–450)
POTASSIUM SERPL-SCNC: 3.8 MMOL/L (ref 3.5–5.3)
RBC # BLD AUTO: 4.25 X10*6/UL (ref 4–5.2)
SODIUM SERPL-SCNC: 140 MMOL/L (ref 136–145)
WBC # BLD AUTO: 13.4 X10*3/UL (ref 4.4–11.3)

## 2024-06-18 PROCEDURE — 99233 SBSQ HOSP IP/OBS HIGH 50: CPT | Performed by: PHYSICIAN ASSISTANT

## 2024-06-18 PROCEDURE — 2500000002 HC RX 250 W HCPCS SELF ADMINISTERED DRUGS (ALT 637 FOR MEDICARE OP, ALT 636 FOR OP/ED): Performed by: INTERNAL MEDICINE

## 2024-06-18 PROCEDURE — 80048 BASIC METABOLIC PNL TOTAL CA: CPT | Performed by: INTERNAL MEDICINE

## 2024-06-18 PROCEDURE — 94640 AIRWAY INHALATION TREATMENT: CPT | Mod: MUE

## 2024-06-18 PROCEDURE — 2500000004 HC RX 250 GENERAL PHARMACY W/ HCPCS (ALT 636 FOR OP/ED): Performed by: PHYSICIAN ASSISTANT

## 2024-06-18 PROCEDURE — 36415 COLL VENOUS BLD VENIPUNCTURE: CPT | Performed by: INTERNAL MEDICINE

## 2024-06-18 PROCEDURE — 83735 ASSAY OF MAGNESIUM: CPT | Performed by: PHYSICIAN ASSISTANT

## 2024-06-18 PROCEDURE — 2500000001 HC RX 250 WO HCPCS SELF ADMINISTERED DRUGS (ALT 637 FOR MEDICARE OP): Performed by: INTERNAL MEDICINE

## 2024-06-18 PROCEDURE — 85027 COMPLETE CBC AUTOMATED: CPT | Performed by: INTERNAL MEDICINE

## 2024-06-18 PROCEDURE — 2500000001 HC RX 250 WO HCPCS SELF ADMINISTERED DRUGS (ALT 637 FOR MEDICARE OP): Performed by: PHYSICIAN ASSISTANT

## 2024-06-18 PROCEDURE — 2500000004 HC RX 250 GENERAL PHARMACY W/ HCPCS (ALT 636 FOR OP/ED): Performed by: INTERNAL MEDICINE

## 2024-06-18 PROCEDURE — 2500000005 HC RX 250 GENERAL PHARMACY W/O HCPCS: Performed by: INTERNAL MEDICINE

## 2024-06-18 PROCEDURE — 1100000001 HC PRIVATE ROOM DAILY

## 2024-06-18 RX ORDER — DOXYCYCLINE 100 MG/1
100 CAPSULE ORAL EVERY 12 HOURS SCHEDULED
Status: DISCONTINUED | OUTPATIENT
Start: 2024-06-18 | End: 2024-06-20 | Stop reason: HOSPADM

## 2024-06-18 ASSESSMENT — COGNITIVE AND FUNCTIONAL STATUS - GENERAL
DRESSING REGULAR LOWER BODY CLOTHING: A LITTLE
TOILETING: A LITTLE
MOBILITY SCORE: 21
HELP NEEDED FOR BATHING: A LITTLE
STANDING UP FROM CHAIR USING ARMS: A LITTLE
HELP NEEDED FOR BATHING: A LITTLE
MOBILITY SCORE: 21
STANDING UP FROM CHAIR USING ARMS: A LITTLE
DAILY ACTIVITIY SCORE: 21
CLIMB 3 TO 5 STEPS WITH RAILING: A LITTLE
TOILETING: A LITTLE
DRESSING REGULAR LOWER BODY CLOTHING: A LITTLE
WALKING IN HOSPITAL ROOM: A LITTLE
DAILY ACTIVITIY SCORE: 21
CLIMB 3 TO 5 STEPS WITH RAILING: A LITTLE
WALKING IN HOSPITAL ROOM: A LITTLE

## 2024-06-18 ASSESSMENT — ENCOUNTER SYMPTOMS
NERVOUS/ANXIOUS: 1
NAUSEA: 0
DIARRHEA: 0
SORE THROAT: 0
TROUBLE SWALLOWING: 0
NUMBNESS: 0
DYSURIA: 0
ACTIVITY CHANGE: 1
FREQUENCY: 0
WEAKNESS: 1
VOMITING: 0
FATIGUE: 0
BACK PAIN: 1
WHEEZING: 0
FEVER: 0
DIZZINESS: 0
CHILLS: 0
AGITATION: 1
BACK PAIN: 0
CHEST TIGHTNESS: 0
HALLUCINATIONS: 0
BRUISES/BLEEDS EASILY: 0
DIAPHORESIS: 0
LIGHT-HEADEDNESS: 0
FATIGUE: 1
HEADACHES: 0
HEMATURIA: 0
COUGH: 0
WEAKNESS: 0
ABDOMINAL PAIN: 0
FACIAL SWELLING: 0
JOINT SWELLING: 0
PALPITATIONS: 0
SHORTNESS OF BREATH: 1
EYE PAIN: 0
WOUND: 0
FLANK PAIN: 0
BLOOD IN STOOL: 0
CONFUSION: 0
CONSTIPATION: 0
APPETITE CHANGE: 0

## 2024-06-18 ASSESSMENT — PAIN SCALES - GENERAL
PAINLEVEL_OUTOF10: 0 - NO PAIN
PAINLEVEL_OUTOF10: 8
PAINLEVEL_OUTOF10: 8
PAINLEVEL_OUTOF10: 5 - MODERATE PAIN
PAINLEVEL_OUTOF10: 4
PAINLEVEL_OUTOF10: 0 - NO PAIN

## 2024-06-18 ASSESSMENT — PAIN - FUNCTIONAL ASSESSMENT
PAIN_FUNCTIONAL_ASSESSMENT: 0-10

## 2024-06-18 ASSESSMENT — PAIN DESCRIPTION - LOCATION: LOCATION: BACK

## 2024-06-18 ASSESSMENT — PAIN DESCRIPTION - ORIENTATION: ORIENTATION: MID

## 2024-06-18 NOTE — PROGRESS NOTES
Abigail Aquino is a 79 y.o. female on day 1 of admission presenting with COPD with acute exacerbation (Multi).      Subjective   Abigail Aquino is a 79 y.o. female with past medical history of O2 dependent COPD/emphysema, coronary artery disease, adenocarcinoma of the left lung, remote pulmonary emboli, hypertension, dyslipidemia presented 6/16/24 with increasing shortness of breath.  Patient states that she has intermittent episodes where she just will desat into the 70s at home with minimal exertion.  She is able to rest and recover.  She states that the last day and a half she has noted wheezing and increasing shortness of breath and less endurance.  She denies having any chest pain with this she denies having any fevers or chills.  Patient does have a cough.  In the emergency room patient on her 4 L nasal cannula satting sats in the mid 90s.  Patient will drop down into the low 80s with exertion.  BMP has a potassium of 3.2 otherwise is unremarkable.  Patient's BNP is 226 troponin of 7 with repeat of 7. Venous gas shows pH of 7.36 with a pCO2 of 74 which is patient's baseline.  Chest x-ray shows slight increased size of nodular opacity in the left lower lobe corresponding to patient's known malignancy otherwise her lungs are clear.  Given patient's hypoxia patient had a CT angiogram obtained that shows no significant change with patient having known pulmonary mass now is 3.4 cm. She endorses DNR-CCA/DNI. She met with palliative care services and has determined she does not want any treatment for her cancer   She did ask to speak with Dr. Wooten who is agreeable to discuss radiation with her but she now believes she would like a hospice consult. Will discuss with both until decision is made by patient.     6/18/2024: No acute events overnight. Vitals stable, 94% on 6L. Labs largely unremarkable, leukocytosis likely from steroids. She states she feels no better today, has worsening pleuritic pain. She  asked to speak with Dr. Wooten as she is hopeful radiation therapy could help her pleuritic pain. However, she then met with palliative care who discussed goals of care, palliative care vs hospice and patient and her friend have chosen to meet with [facility] for hospice services.          Review of Systems   Constitutional:  Negative for appetite change, chills, diaphoresis, fatigue and fever.   HENT:  Negative for congestion, ear pain, facial swelling, hearing loss, nosebleeds, sore throat, tinnitus and trouble swallowing.    Eyes:  Negative for pain.   Respiratory:  Positive for shortness of breath. Negative for cough, chest tightness and wheezing.    Cardiovascular:  Positive for chest pain (Pleuritic). Negative for palpitations and leg swelling.   Gastrointestinal:  Negative for abdominal pain, blood in stool, constipation, diarrhea, nausea and vomiting.   Genitourinary:  Negative for dysuria, flank pain, frequency, hematuria and urgency.   Musculoskeletal:  Negative for back pain and joint swelling.   Skin:  Negative for rash and wound.   Neurological:  Negative for dizziness, syncope, weakness, light-headedness, numbness and headaches.   Hematological:  Does not bruise/bleed easily.   Psychiatric/Behavioral:  Negative for behavioral problems, hallucinations and suicidal ideas.           Objective     Last Recorded Vitals  BP (!) 163/92 (BP Location: Right arm, Patient Position: Lying)   Pulse 89   Temp 36.6 °C (97.9 °F) (Temporal)   Resp 18   Wt 59 kg (130 lb)   SpO2 97%     Image Results  Electrocardiogram, 12-lead PRN ACS symptoms    Result Date: 6/17/2024  Sinus rhythm Multiple ventricular premature complexes Biatrial enlargement Probable anterior infarct, old    CT angio chest for pulmonary embolism    Result Date: 6/17/2024  Interpreted By:  Sami Jeffers, STUDY: CT ANGIO CHEST FOR PULMONARY EMBOLISM;  6/17/2024 1:23 am   INDICATION: Signs/Symptoms:CP SOB.   COMPARISON: 05/02/2024. Correlation also made  to chest radiography of 06/16/2024.   ACCESSION NUMBER(S): [id number]   ORDERING CLINICIAN: JOSE GUADALUPE PORTILLO   TECHNIQUE: Helical data acquisition of the chest was obtained after intravenous administration of 75 ML Omnipaque 350, as per PE protocol. Images were reformatted in coronal and sagittal planes. Axial and coronal maximum intensity projection (MIP) images were created and reviewed.   FINDINGS: POTENTIAL LIMITATIONS OF THE STUDY: Respiratory motion.   HEART AND VESSELS: No acute pulmonary embolus through the segmental level. Main pulmonary artery and its branches are normal in caliber.   The thoracic aorta normal in course and caliber.There is moderate atherosclerosis present, including calcified and noncalcified plaques. Moderate coronary artery calcifications are seen. Please note,the study is not optimized for evaluation of coronary arteries.   The cardiac chambers are not enlarged.   There is no pericardial effusion seen.   MEDIASTINUM AND SHELIA, LOWER NECK AND AXILLA: The visualized thyroid gland is within normal limits. No evidence of thoracic lymphadenopathy by CT criteria. Esophagus appears within normal limits as seen.   LUNGS AND AIRWAYS: The trachea and central airways are patent. No endobronchial lesion is seen.   Respiratory motion artifact limits evaluation of lung parenchyma.   Patchy asymmetric reticulonodular opacity posteriorly in the right lung base may relate to scarring or bronchopneumonia, possibly secondary to aspiration. No focal consolidation, pleural effusion, or pneumothorax.   No substantial interval change in size or appearance of left lower lobe mass measuring up to 3.4 cm in transaxial diameter compatible with patient's known lung malignancy.   Moderate to severe emphysema. Similar appearance of cylindrical right lower lobe cylindrical bronchiectasis, probably sequelae of chronic infection.       UPPER ABDOMEN: The visualized subdiaphragmatic structures demonstrate no remarkable  findings.       CHEST WALL AND OSSEOUS STRUCTURES: Chest wall is within normal limits. No acute osseous pathology.There are no suspicious osseous lesions.Mild compression of the T8 vertebral body, new from the prior exam, but otherwise age-indeterminate. Similar appearance of burst fracture deformity of the L1 vertebral body with mild-to-moderate height loss and retropulsion of the superior endplate with associated mild-to-moderate canal narrowing.       No acute pulmonary embolus through the segmental level.   Respiratory motion artifact limits evaluation of lung parenchyma.   Patchy asymmetric reticulonodular opacity posteriorly in the right lung base may relate to scarring or bronchopneumonia, possibly secondary to aspiration. No focal consolidation, pleural effusion, or pneumothorax.   No substantial interval change in size or appearance of left lower lobe mass measuring up to 3.4 cm in transaxial diameter compatible with patient's known lung malignancy.   Moderate to severe emphysema. Similar appearance of cylindrical right lower lobe cylindrical bronchiectasis, probably sequelae of chronic infection.   Atherosclerosis, including coronary artery disease.   Mild compression of the T8 vertebral body, new from the prior exam of 05/02/2024, but otherwise age-indeterminate. Correlate with any recent trauma and physical exam findings to better assess its age. No definite underlying lesion to suggest pathologic etiology.   Additional findings as discussed above.   MACRO: None   Signed by: Sami Jeffers 6/17/2024 2:13 AM Dictation workstation:   [id number]    XR chest 1 view    Result Date: 6/17/2024  Interpreted By:  Sami Jeffers, STUDY: XR CHEST 1 VIEW;  6/16/2024 11:33 pm   INDICATION: Signs/Symptoms:sob.   COMPARISON: 05/02/2024.   ACCESSION NUMBER(S): [id number]   ORDERING CLINICIAN: JOSE GUADALUPE PORTILLO   FINDINGS: AP radiograph of the chest was provided.       CARDIOMEDIASTINAL SILHOUETTE: Cardiomediastinal  silhouette is normal in size and configuration.   LUNGS: There is slight increased size of nodular opacity in the left lower lobe corresponding to patient's known malignancy. Lungs otherwise appear clear. No pleural effusion or pneumothorax.   ABDOMEN: No remarkable upper abdominal findings.   BONES: No acute osseous changes.       1.  There is slight increased size of nodular opacity in the left lower lobe corresponding to patient's known malignancy. Lungs otherwise appear clear. No pleural effusion or pneumothorax.       MACRO: None   Signed by: Sami Jeffers 6/17/2024 12:01 AM Dictation workstation:   [id number]       Lab Results  Results for orders placed or performed during the hospital encounter of 06/16/24 (from the past 24 hour(s))   CBC   Result Value Ref Range    WBC 13.4 (H) 4.4 - 11.3 x10*3/uL    nRBC 0.0 0.0 - 0.0 /100 WBCs    RBC 4.25 4.00 - 5.20 x10*6/uL    Hemoglobin 13.4 12.0 - 16.0 g/dL    Hematocrit 41.2 36.0 - 46.0 %    MCV 97 80 - 100 fL    MCH 31.5 26.0 - 34.0 pg    MCHC 32.5 32.0 - 36.0 g/dL    RDW 13.2 11.5 - 14.5 %    Platelets 265 150 - 450 x10*3/uL   Basic metabolic panel   Result Value Ref Range    Glucose 139 (H) 74 - 99 mg/dL    Sodium 140 136 - 145 mmol/L    Potassium 3.8 3.5 - 5.3 mmol/L    Chloride 100 98 - 107 mmol/L    Bicarbonate 33 (H) 21 - 32 mmol/L    Anion Gap 11 10 - 20 mmol/L    Urea Nitrogen 10 6 - 23 mg/dL    Creatinine 0.48 (L) 0.50 - 1.05 mg/dL    eGFR >90 >60 mL/min/1.73m*2    Calcium 8.9 8.6 - 10.3 mg/dL   Magnesium   Result Value Ref Range    Magnesium 1.79 1.60 - 2.40 mg/dL        Medications  Scheduled medications:  apixaban, 5 mg, oral, BID  doxycylcine, 100 mg, oral, q12h CARMEN  tiotropium, 2 puff, inhalation, Daily   And  fluticasone furoate-vilanteroL, 1 puff, inhalation, Daily  [Held by provider] furosemide, 20 mg, oral, Daily  guaiFENesin, 1,200 mg, oral, BID  ipratropium-albuteroL, 3 mL, nebulization, 4x daily  isosorbide mononitrate ER, 30 mg, oral,  Daily  lidocaine, 1 patch, transdermal, Daily  methylPREDNISolone sodium succinate (PF), 40 mg, intravenous, q12h  metoprolol tartrate, 50 mg, oral, BID  oxygen, , inhalation, Continuous - Inhalation  pantoprazole, 40 mg, oral, Daily before breakfast  polyethylene glycol, 17 g, oral, Daily  pravastatin, 40 mg, oral, Daily      Continuous medications:       PRN medications:  PRN medications: acetaminophen, acetaminophen, benzocaine-menthol, bisacodyl, hydrOXYzine HCL, ipratropium-albuteroL, morphine, morphine     Physical Exam  Constitutional:       General: She is not in acute distress.     Appearance: Normal appearance.      Comments: Elderly, frail   HENT:      Head: Normocephalic and atraumatic.      Right Ear: External ear normal.      Left Ear: External ear normal.      Nose: Nose normal.      Mouth/Throat:      Mouth: Mucous membranes are moist.      Pharynx: Oropharynx is clear.      Comments: Poor dentition  Eyes:      Extraocular Movements: Extraocular movements intact.      Conjunctiva/sclera: Conjunctivae normal.      Pupils: Pupils are equal, round, and reactive to light.   Cardiovascular:      Rate and Rhythm: Normal rate and regular rhythm.      Pulses: Normal pulses.      Heart sounds: Normal heart sounds.   Pulmonary:      Effort: Pulmonary effort is normal. No respiratory distress.      Breath sounds: No wheezing, rhonchi or rales.      Comments: Breath sounds diminished bilaterally  Appears to have pain with deep breaths  Abdominal:      General: Bowel sounds are normal.      Palpations: Abdomen is soft.      Tenderness: There is no abdominal tenderness. There is no right CVA tenderness, left CVA tenderness, guarding or rebound.   Musculoskeletal:         General: No swelling. Normal range of motion.      Cervical back: Normal range of motion and neck supple.   Skin:     General: Skin is warm and dry.      Capillary Refill: Capillary refill takes less than 2 seconds.      Findings: No lesion or  rash.   Neurological:      General: No focal deficit present.      Mental Status: She is alert and oriented to person, place, and time. Mental status is at baseline.   Psychiatric:         Mood and Affect: Mood normal.         Behavior: Behavior normal.                  Code Status  DNR and No Intubation     Assessment/Plan      Acute COPD exacerbation  Patient is placed on Solu-Medrol beta agonist O2 support and doxycycline  Patient has minimal reserves at baseline  Patient is chronically O2 dependent requiring 3 to 4 L continuously, with ambulation patient desats this is no different than patient has been doing for the past 6 months  Pulmonary consultation will be obtained  Try and maximize patient's ambulatory and exertional capabilities but explained to the patient her limitations likely will not improve as she has end-stage emphysema   She has ~130 pack year history (started smoking at age 11 and quit 08/2023, smoking ~2 PPD)   Palliative care consultation     Adenocarcinoma of the lung  Patient has diagnosis from February but has yet to have any treatment- she has now decided she does not want any treatment  Palliative care consultation  DNR-CCA/DNI- could be good candidate for hospice as she only wishes for comfort measures  Hospice consult placed 6/18/24     Remote history of pulmonary emboli  Patient chronically anticoagulated with Eliquis  No PE on CTA on arrival      Coronary artery disease  Pain is pleuritic  Low suspicion for ACS  Cardiac enzymes negative     Hypertension  Stable on home meds     Social issues  Patient states that she has a neighbor who is helping to take care of her.  She has had different services out to assist her but they cannot provide services as there are not enough staffing  Patient has 2 children that live out of state  Patient is adamant that she does not want to go to a nursing facility    DVT ppx: Eliquis       Please see orders for more complete plan    Reyna Honeycutt  [facility]

## 2024-06-18 NOTE — CONSULTS
" Palliative Care Consultation    History obtained from: patient, previous palliative care consult and medical records     HPI: Abigail Aquino is a 79 y.o. female with past medical history significant for  pulmonary embolism , adenocarcinoma left lung cancer dx 2023, COPD, chronic hypoxic respiratory failure on home oxygen 3 L since 2023, MI , hypertension, dyslipidemia, claustrophobia and anxiety. Patient presented to the ED  d/t shortness of breath and hypoxia. She was found to have COPD exacerbation, acute on chronic respiratory failure     PSH: right leg surgery including mary anne/plates/pins , lung biopsy , cardiac stent, appendectomy     FH: father  at 65 \"massive coronary\", daughter cervical cancer, mother and grandmother dementia     Palliative care consulted for goals of care, code status, advanced directive, high risk for readmissions, symptom management and outpatient support.    Patient known to me from previous palliative care consult 2024. At that time she wished to remain a full code until she spoke with her son, had frequent hospitalizations/ED visits and felt that her QOL over the previous 6 months had \"100% deteriorated with breathing since August\". Her goal was to improve her respiratory status and not focus on the cancer which she felt was slow growing. She also shared the importance of her remaining independent and continue to be able to live alone. She was aware that her cancer was unable to be surgically resected d/t her advanced COPD and she was offered radiation and planned to follow up with Dr. Castañeda to discuss chemotherapy. She declined assistance with completing her health care power of  and also declined outpatient palliative care services.     Joint interprofessional visit completed with Michelle Palmer, Elo    Met with patient who initially declined other loved ones to be present for meeting however her friend Lexy arrived " mid meeting and she provided permission for her to stay and to share medical information. Discussed PMH, HPI, and current hospitalization. Patient explains that she has cancelled planned testing for staging and SBRT planning as she felt too poor to go through with further workup or treatment. She does NOT want cancer treatment but felt compelled to discuss radiation further because 3 physicians recommended it to her for pain control. I discussed how hospice could manage the pain and she was much happier with that avenue as she was concerned about side effects of radiation, lying flat and still and being severely claustrophobic. Answered questions, provided support and education.     Social History   Marital Status:   Children: 2 children including son who lives in [place] and daughter who lives in [place]   Employment: worked as a on site  and then    [place] Status: not a    Tobacco use history: former smoker, started at age 11 and quit August 2023  Alcohol use history: denies  Illicit substance use history: denies  Baptism/Cultural/Spiritual: believes in a higher being, no particular preferences  Patient finds jc and happiness in: cats, misses the family unit of everyone together but explains that they have lived separately for many years, traveling in the past as son is a     Living arrangement and functional status prior to admission: Xenia lives at home alone where she is independent in self care and still driving however explains she has had poor balance poor since her leg surgery in 2021 and requires frequent breaks d/t TUBBS. She has been paying her neighbors to assist with laundry, getting the mail, grocery shopping (uses the motorized grocery carts) with her and carrying the groceries/putting them away, and cutting the lawn. She explains that she is active with [facility] and has been approved for home health aid hours however  "they are unable to staff. It has been difficult to drive herself to all of her scheduled appointments and needs  parking  Recent falls: denies  Cognitive status: good, \"sharp as a tack\"  ADL's dressing/bathing: independent but getting harder  Assistive devices: walker and cane  Cooking: receives mobile meals and makes cold sandwiches, requires frequent breaks  Transportation: still driving     Prior Hospitalizations: patient has had 4 hospitalizations and 4 ED visits in the last 10 months   5/2-5/9/24: acute on chronic hypoxic respiratory failure, COPD exacerbation  3/5/24: ED back pain after twisting with new compression deformity  2/9-2/14/24: acute on chronic hypoxic/hypercarbic respiratory failure and COPD exacerbation  12/28/23: ED hemoptysis   12/14/23: ED left sided chest pain and shortness of breath   7/13/23: ED edema  8/27-8/30/23: acute on chronic respiratory failure and diastolic heart failure, COPD Exac, chronic PE : feet/ankles with edema     Patient/family description of QOL over the last 6 months: declining since March      Goals of Care  Decisional Capacity: yes  Understanding of illness: good   Most important at this time: pain control, going home and being able to stay there   Expectations of treatment: meet with hospice to discuss home services, would rather not have outpatient medical appointments or further hospitalizations. No cancer treatments, DNR/DNI  Fears/concerns: \"a lot of things are affecting me and I don't have control\"    Advanced Care Planning/Advanced Directives   Health care power of /Living Will/living will: in agreement to complete, requested SW to follow up     [place] DNR form: will need prior to discharge    Current code status: DNR and No Intubation   Code status discussed today? Yes, confirmed DNR/DNI    Is the patient hospice/outpatient palliative/supportive oncology eligible? yes  Was a discussion held re: hospice/outpatient palliative care services? yes, " provided information  Recommendation: hospice  Agency: [facility] [place]    Review of Systems   Constitutional:  Positive for activity change and fatigue.   Respiratory:  Positive for shortness of breath.    Musculoskeletal:  Positive for back pain.   Neurological:  Positive for weakness.   Psychiatric/Behavioral:  Positive for agitation. Negative for confusion. The patient is nervous/anxious.        Physical Exam  Constitutional:       General: She is in acute distress.   HENT:      Head: Normocephalic.      Ears:      Comments: Wearing hearing aids     Nose: Nose normal.      Mouth/Throat:      Mouth: Mucous membranes are moist.   Eyes:      General: No scleral icterus.     Comments: Wearing eyeglasses   Cardiovascular:      Rate and Rhythm: Normal rate.   Pulmonary:      Comments: Oxygen via NC  Musculoskeletal:      Cervical back: Neck supple.   Skin:     General: Skin is warm and dry.   Neurological:      Mental Status: She is oriented to person, place, and time.   Psychiatric:      Comments: Agitated and tearful at times throughout visit        Plan    Interventions for symptom management: utilizing PRN IV Morphine which she states is effective for pain and air hunger  Goals of care: meet with hospice to discuss home services, would rather not have outpatient medical appointments or further hospitalizations. No cancer treatments, DNR/DNI  Identified surrogate medical decision maker based on [place] hierarchy: 2 adult children  Code status: DNR/DNI [place] DNR: will need prior to discharge  Consult music therapy and pastoral care   Outpatient referral: referral placed for [facility] for home hospice     Collaborated with: SARAH Wellington, and SENG MUNOZ spent 90 minutes in the professional and overall care of this patient.      Palliative care service will not be available 6/19/24    Johnathan Ortega, VAL-CNP

## 2024-06-18 NOTE — CARE PLAN
I spoke with Ms. Aquino, her friend, and her son while she was resting in bed this afternoon. She has spoken to palliative care and wishes to pursue hospice care at home to help with her breathing and pleuritic chest pain.    She wishes to cancel her appointments for bronchoscopy/EBUS and CT simulation for radiation planning. I will inform the interventional pulmonology and rad onc teams about her decision.     Clif Wooten MD  Radiation Oncology  [facility]

## 2024-06-18 NOTE — PROGRESS NOTES
Per MARCEL MAN, palliative care coordinator: Met with pt and/or family to discuss goals of care, palliative and hospice services. Pt and/or family chose hospice services. Discussed [facility] (with financial disclosure) and community hospice options. Pt and/or family chose [facility]. Social work placed referral, care team notified, agency will arrange meeting with family to discuss services.     Donny Dixon, MSW, LSW ([id number])   Care Transitions

## 2024-06-18 NOTE — PROGRESS NOTES
Physical Therapy                 Therapy Communication Note    Patient Name: Abigail Aquino  MRN: [id number]  Today's Date: 6/18/2024     Discipline: Physical Therapy    Missed Visit Reason: Missed Visit Reason: Patient refused    Comment: Pt requested to re-attempt eval tomorrow due to pain and fatigue today.

## 2024-06-19 PROCEDURE — 94640 AIRWAY INHALATION TREATMENT: CPT | Mod: MUE

## 2024-06-19 PROCEDURE — 2500000001 HC RX 250 WO HCPCS SELF ADMINISTERED DRUGS (ALT 637 FOR MEDICARE OP): Performed by: INTERNAL MEDICINE

## 2024-06-19 PROCEDURE — 2500000002 HC RX 250 W HCPCS SELF ADMINISTERED DRUGS (ALT 637 FOR MEDICARE OP, ALT 636 FOR OP/ED): Mod: MUE | Performed by: INTERNAL MEDICINE

## 2024-06-19 PROCEDURE — 99233 SBSQ HOSP IP/OBS HIGH 50: CPT | Performed by: PHYSICIAN ASSISTANT

## 2024-06-19 PROCEDURE — 2500000004 HC RX 250 GENERAL PHARMACY W/ HCPCS (ALT 636 FOR OP/ED): Performed by: INTERNAL MEDICINE

## 2024-06-19 PROCEDURE — 2500000004 HC RX 250 GENERAL PHARMACY W/ HCPCS (ALT 636 FOR OP/ED): Performed by: PHYSICIAN ASSISTANT

## 2024-06-19 PROCEDURE — 1100000001 HC PRIVATE ROOM DAILY

## 2024-06-19 PROCEDURE — 2500000001 HC RX 250 WO HCPCS SELF ADMINISTERED DRUGS (ALT 637 FOR MEDICARE OP): Performed by: PHYSICIAN ASSISTANT

## 2024-06-19 PROCEDURE — 2500000005 HC RX 250 GENERAL PHARMACY W/O HCPCS: Performed by: INTERNAL MEDICINE

## 2024-06-19 RX ORDER — TALC
6 POWDER (GRAM) TOPICAL NIGHTLY PRN
Status: DISCONTINUED | OUTPATIENT
Start: 2024-06-19 | End: 2024-06-20 | Stop reason: HOSPADM

## 2024-06-19 ASSESSMENT — PAIN DESCRIPTION - LOCATION
LOCATION: BACK

## 2024-06-19 ASSESSMENT — COGNITIVE AND FUNCTIONAL STATUS - GENERAL
STANDING UP FROM CHAIR USING ARMS: A LITTLE
DRESSING REGULAR LOWER BODY CLOTHING: A LITTLE
TOILETING: A LITTLE
HELP NEEDED FOR BATHING: A LITTLE
STANDING UP FROM CHAIR USING ARMS: A LITTLE
TOILETING: A LITTLE
TOILETING: A LITTLE
CLIMB 3 TO 5 STEPS WITH RAILING: A LITTLE
DAILY ACTIVITIY SCORE: 21
WALKING IN HOSPITAL ROOM: A LITTLE
DAILY ACTIVITIY SCORE: 21
MOBILITY SCORE: 21
DRESSING REGULAR LOWER BODY CLOTHING: A LITTLE
CLIMB 3 TO 5 STEPS WITH RAILING: A LITTLE
DRESSING REGULAR LOWER BODY CLOTHING: A LITTLE
WALKING IN HOSPITAL ROOM: A LITTLE
STANDING UP FROM CHAIR USING ARMS: A LITTLE
TOILETING: A LITTLE
WALKING IN HOSPITAL ROOM: A LITTLE
DRESSING REGULAR LOWER BODY CLOTHING: A LITTLE
HELP NEEDED FOR BATHING: A LITTLE
CLIMB 3 TO 5 STEPS WITH RAILING: A LITTLE
HELP NEEDED FOR BATHING: A LITTLE
MOBILITY SCORE: 21
WALKING IN HOSPITAL ROOM: A LITTLE
CLIMB 3 TO 5 STEPS WITH RAILING: A LITTLE
HELP NEEDED FOR BATHING: A LITTLE
STANDING UP FROM CHAIR USING ARMS: A LITTLE

## 2024-06-19 ASSESSMENT — PAIN SCALES - GENERAL
PAINLEVEL_OUTOF10: 0 - NO PAIN
PAINLEVEL_OUTOF10: 2
PAINLEVEL_OUTOF10: 0 - NO PAIN
PAINLEVEL_OUTOF10: 6
PAINLEVEL_OUTOF10: 8
PAINLEVEL_OUTOF10: 8

## 2024-06-19 ASSESSMENT — ENCOUNTER SYMPTOMS
VOMITING: 0
BACK PAIN: 0
SHORTNESS OF BREATH: 1
DIARRHEA: 0
PALPITATIONS: 0
FATIGUE: 0
CONSTIPATION: 0
APPETITE CHANGE: 0
EYE PAIN: 0
FEVER: 0
DIZZINESS: 0
NAUSEA: 0
ABDOMINAL PAIN: 0
WEAKNESS: 0
TROUBLE SWALLOWING: 0
DIAPHORESIS: 0
WOUND: 0
SORE THROAT: 0
HEADACHES: 0
FLANK PAIN: 0
COUGH: 0
BRUISES/BLEEDS EASILY: 0
NUMBNESS: 0
LIGHT-HEADEDNESS: 0
HALLUCINATIONS: 0
FACIAL SWELLING: 0
DYSURIA: 0
HEMATURIA: 0
CHEST TIGHTNESS: 0
FREQUENCY: 0
CHILLS: 0
JOINT SWELLING: 0
WHEEZING: 0
BLOOD IN STOOL: 0

## 2024-06-19 ASSESSMENT — PAIN DESCRIPTION - ORIENTATION
ORIENTATION: MID

## 2024-06-19 ASSESSMENT — PAIN - FUNCTIONAL ASSESSMENT
PAIN_FUNCTIONAL_ASSESSMENT: 0-10

## 2024-06-19 NOTE — CARE PLAN
Problem: Fall/Injury  Goal: Not fall by end of shift  Outcome: Progressing  Goal: Use assistive devices by end of the shift  Outcome: Progressing     Problem: Pain  Goal: Walks with improved pain control throughout the shift  Outcome: Progressing   The patient's goals for the shift include less sob    The clinical goals for the shift include pt will remain fall free throughout my shift

## 2024-06-19 NOTE — PROGRESS NOTES
Physical Therapy                 Therapy Communication Note    Patient Name: Abigail Aquino  MRN: [id number]  Today's Date: 6/19/2024     Discipline: Physical Therapy    Missed Visit Reason: Missed Visit Reason: Other (Comment) (pt was unavailable due to phone call post extended family meeting)      Comment: Unable to see pt at this time as pt was unavailable and on phone, will try again tomorrow.     RIZWAN TURK, S-PT

## 2024-06-19 NOTE — PROGRESS NOTES
Pt interested in POA and Living Will paperwork. SW provided requested paperwork and educated pt re: utilization and completion, including needing a notary or two witnesses for signatures. Assisted pt in completing paperwork, accompanied by ELIAS Gee. Original copies left with pt, copies placed in pt's medical chart and copies printed for pt's family. No other needs identified at this time, SW signing off,  pt and care team aware of SW availability while inpt.      EILAS Bolaños ([id number])   Care Transitions

## 2024-06-19 NOTE — CARE PLAN
Schedule follow up labs for anemia   The patient's goals for the shift include less sob    The clinical goals for the shift include Patient will remain free from falls and injury througout shift      Problem: Fall/Injury  Goal: Not fall by end of shift  Outcome: Progressing  Goal: Be free from injury by end of the shift  Outcome: Progressing  Goal: Verbalize understanding of personal risk factors for fall in the hospital  Outcome: Progressing  Goal: Verbalize understanding of risk factor reduction measures to prevent injury from fall in the home  Outcome: Progressing  Goal: Use assistive devices by end of the shift  Outcome: Progressing  Goal: Pace activities to prevent fatigue by end of the shift  Outcome: Progressing     Problem: Pain  Goal: Takes deep breaths with improved pain control throughout the shift  Outcome: Progressing  Goal: Turns in bed with improved pain control throughout the shift  Outcome: Progressing  Goal: Walks with improved pain control throughout the shift  Outcome: Progressing  Goal: Performs ADL's with improved pain control throughout shift  Outcome: Progressing  Goal: Participates in PT with improved pain control throughout the shift  Outcome: Progressing  Goal: Free from opioid side effects throughout the shift  Outcome: Progressing  Goal: Free from acute confusion related to pain meds throughout the shift  Outcome: Progressing     Problem: Pain - Adult  Goal: Verbalizes/displays adequate comfort level or baseline comfort level  Outcome: Progressing     Problem: Safety - Adult  Goal: Free from fall injury  Outcome: Progressing     Problem: Discharge Planning  Goal: Discharge to home or other facility with appropriate resources  Outcome: Progressing     Problem: Chronic Conditions and Co-morbidities  Goal: Patient's chronic conditions and co-morbidity symptoms are monitored and maintained or improved  Outcome: Progressing

## 2024-06-19 NOTE — PROGRESS NOTES
Occupational Therapy                 Therapy Communication Note    Patient Name: Abigail Aquino  MRN: [id number]  Today's Date: 6/19/2024     Discipline: Occupational Therapy    Missed Visit Reason: Missed Visit Reason: Patient refused (Patient getting meds at this time. Patient declined OT session until after her hospice/palliative care meeting at 1330.)    Missed Time: Attempt    Comment:

## 2024-06-19 NOTE — PROGRESS NOTES
"Music Therapy Note    Abigail Aquino was referred by Palliative Care    Therapy Session  Referral Type: New referral this admission  Visit Type: New visit  Session Start Time: 1407  Session End Time: 1408  Intervention Delivery: In-person  Conflict of Service: None  Number of family members present: 0  Number of staff members present: 0     Pre-assessment  Unable to Assess Reason: Patient declined to answer  Mood/Affect: Anxious, Disinterested, Guarded  Verbalized Emotional State: Frustration (\"I don't feel good and I don't want music therapy.\")         Treatment/Interventions       Post-assessment  Unable to Assess Reason: Did not provide expressive therapy intervention  Total Session Time (min): 1 minutes    Narrative  Assessment Detail: Upon arrival of music therapist, pt was seen in room awake, alert, displaying negative affect. Pt expressed that she did not want music therapist in room via putting up her hands, shaking her head and stating, \"I don't feel good, I don't want music therapy, I don't want anything from you.\"  Plan: MT was referred by several team members. Music therapist provided education on services with a focus on stress managment.  Intervention: No intervention at this time, pt declined to participate.  Evaluation: NA  Follow-up: If applicable, music therapy will follow up at at later time.    Education Documentation  No documentation found.          "

## 2024-06-19 NOTE — PROGRESS NOTES
Abigail Aquino is a 79 y.o. female on day 2 of admission presenting with COPD with acute exacerbation (Multi).      Subjective   Abigail Aquino is a 79 y.o. female with past medical history of O2 dependent COPD/emphysema, coronary artery disease, adenocarcinoma of the left lung, remote pulmonary emboli, hypertension, dyslipidemia presented 6/16/24 with increasing shortness of breath.  Patient states that she has intermittent episodes where she just will desat into the 70s at home with minimal exertion.  She is able to rest and recover.  She states that the last day and a half she has noted wheezing and increasing shortness of breath and less endurance.  She denies having any chest pain with this she denies having any fevers or chills.  Patient does have a cough.  In the emergency room patient on her 4 L nasal cannula satting sats in the mid 90s.  Patient will drop down into the low 80s with exertion.  BMP has a potassium of 3.2 otherwise is unremarkable.  Patient's BNP is 226 troponin of 7 with repeat of 7. Venous gas shows pH of 7.36 with a pCO2 of 74 which is patient's baseline.  Chest x-ray shows slight increased size of nodular opacity in the left lower lobe corresponding to patient's known malignancy otherwise her lungs are clear.  Given patient's hypoxia patient had a CT angiogram obtained that shows no significant change with patient having known pulmonary mass now is 3.4 cm. She endorses DNR-CCA/DNI. She met with palliative care services and has determined she does not want any treatment for her cancer   She did speak with Dr. Wooten, she would like a hospice consult.     6/19/2024: No acute events overnight. Vitals stable, 93% on 4L, BP is accelerated. No labs.   She is very tangential today, fixated on things that happened in previous hospitalizations and screaming out at inappropriate times although is still alert and oriented x4. She will meet with [facility] at 1:30 today. I attempted to discuss  discharge plans with her regarding discharge to home with hospice at which time she started screaming and crying stating that she is lonely at home.          Review of Systems   Constitutional:  Negative for appetite change, chills, diaphoresis, fatigue and fever.   HENT:  Negative for congestion, ear pain, facial swelling, hearing loss, nosebleeds, sore throat, tinnitus and trouble swallowing.    Eyes:  Negative for pain.   Respiratory:  Positive for shortness of breath. Negative for cough, chest tightness and wheezing.    Cardiovascular:  Positive for chest pain (Pleuritic). Negative for palpitations and leg swelling.   Gastrointestinal:  Negative for abdominal pain, blood in stool, constipation, diarrhea, nausea and vomiting.   Genitourinary:  Negative for dysuria, flank pain, frequency, hematuria and urgency.   Musculoskeletal:  Negative for back pain and joint swelling.   Skin:  Negative for rash and wound.   Neurological:  Negative for dizziness, syncope, weakness, light-headedness, numbness and headaches.   Hematological:  Does not bruise/bleed easily.   Psychiatric/Behavioral:  Negative for behavioral problems, hallucinations and suicidal ideas.           Objective     Last Recorded Vitals  BP (!) 173/99 (BP Location: Right arm, Patient Position: Lying) Comment: nurse notified  Pulse 83   Temp 36.1 °C (97 °F) (Temporal)   Resp 18   Wt 59 kg (130 lb)   SpO2 96%     Image Results  Electrocardiogram, 12-lead PRN ACS symptoms    Result Date: 6/17/2024  Sinus rhythm Multiple ventricular premature complexes Biatrial enlargement Probable anterior infarct, old    CT angio chest for pulmonary embolism    Result Date: 6/17/2024  Interpreted By:  Sami Jeffers, STUDY: CT ANGIO CHEST FOR PULMONARY EMBOLISM;  6/17/2024 1:23 am   INDICATION: Signs/Symptoms:CP SOB.   COMPARISON: 05/02/2024. Correlation also made to chest radiography of 06/16/2024.   ACCESSION NUMBER(S): [id number]   ORDERING CLINICIAN: JOSE GUADALUPE  WARTMAN   TECHNIQUE: Helical data acquisition of the chest was obtained after intravenous administration of 75 ML Omnipaque 350, as per PE protocol. Images were reformatted in coronal and sagittal planes. Axial and coronal maximum intensity projection (MIP) images were created and reviewed.   FINDINGS: POTENTIAL LIMITATIONS OF THE STUDY: Respiratory motion.   HEART AND VESSELS: No acute pulmonary embolus through the segmental level. Main pulmonary artery and its branches are normal in caliber.   The thoracic aorta normal in course and caliber.There is moderate atherosclerosis present, including calcified and noncalcified plaques. Moderate coronary artery calcifications are seen. Please note,the study is not optimized for evaluation of coronary arteries.   The cardiac chambers are not enlarged.   There is no pericardial effusion seen.   MEDIASTINUM AND SHELIA, LOWER NECK AND AXILLA: The visualized thyroid gland is within normal limits. No evidence of thoracic lymphadenopathy by CT criteria. Esophagus appears within normal limits as seen.   LUNGS AND AIRWAYS: The trachea and central airways are patent. No endobronchial lesion is seen.   Respiratory motion artifact limits evaluation of lung parenchyma.   Patchy asymmetric reticulonodular opacity posteriorly in the right lung base may relate to scarring or bronchopneumonia, possibly secondary to aspiration. No focal consolidation, pleural effusion, or pneumothorax.   No substantial interval change in size or appearance of left lower lobe mass measuring up to 3.4 cm in transaxial diameter compatible with patient's known lung malignancy.   Moderate to severe emphysema. Similar appearance of cylindrical right lower lobe cylindrical bronchiectasis, probably sequelae of chronic infection.       UPPER ABDOMEN: The visualized subdiaphragmatic structures demonstrate no remarkable findings.       CHEST WALL AND OSSEOUS STRUCTURES: Chest wall is within normal limits. No acute  osseous pathology.There are no suspicious osseous lesions.Mild compression of the T8 vertebral body, new from the prior exam, but otherwise age-indeterminate. Similar appearance of burst fracture deformity of the L1 vertebral body with mild-to-moderate height loss and retropulsion of the superior endplate with associated mild-to-moderate canal narrowing.       No acute pulmonary embolus through the segmental level.   Respiratory motion artifact limits evaluation of lung parenchyma.   Patchy asymmetric reticulonodular opacity posteriorly in the right lung base may relate to scarring or bronchopneumonia, possibly secondary to aspiration. No focal consolidation, pleural effusion, or pneumothorax.   No substantial interval change in size or appearance of left lower lobe mass measuring up to 3.4 cm in transaxial diameter compatible with patient's known lung malignancy.   Moderate to severe emphysema. Similar appearance of cylindrical right lower lobe cylindrical bronchiectasis, probably sequelae of chronic infection.   Atherosclerosis, including coronary artery disease.   Mild compression of the T8 vertebral body, new from the prior exam of 05/02/2024, but otherwise age-indeterminate. Correlate with any recent trauma and physical exam findings to better assess its age. No definite underlying lesion to suggest pathologic etiology.   Additional findings as discussed above.   MACRO: None   Signed by: Sami Jeffers 6/17/2024 2:13 AM Dictation workstation:   [id number]    XR chest 1 view    Result Date: 6/17/2024  Interpreted By:  Sami Jeffers, STUDY: XR CHEST 1 VIEW;  6/16/2024 11:33 pm   INDICATION: Signs/Symptoms:sob.   COMPARISON: 05/02/2024.   ACCESSION NUMBER(S): [id number]   ORDERING CLINICIAN: JOSE GUADALUPE PORTILLO   FINDINGS: AP radiograph of the chest was provided.       CARDIOMEDIASTINAL SILHOUETTE: Cardiomediastinal silhouette is normal in size and configuration.   LUNGS: There is slight increased size of nodular  opacity in the left lower lobe corresponding to patient's known malignancy. Lungs otherwise appear clear. No pleural effusion or pneumothorax.   ABDOMEN: No remarkable upper abdominal findings.   BONES: No acute osseous changes.       1.  There is slight increased size of nodular opacity in the left lower lobe corresponding to patient's known malignancy. Lungs otherwise appear clear. No pleural effusion or pneumothorax.       MACRO: None   Signed by: Sami Jeffers 6/17/2024 12:01 AM Dictation workstation:   [id number]       Lab Results  No results found for this or any previous visit (from the past 24 hour(s)).       Medications  Scheduled medications:  apixaban, 5 mg, oral, BID  doxycylcine, 100 mg, oral, q12h CARMEN  tiotropium, 2 puff, inhalation, Daily   And  fluticasone furoate-vilanteroL, 1 puff, inhalation, Daily  [Held by provider] furosemide, 20 mg, oral, Daily  guaiFENesin, 1,200 mg, oral, BID  ipratropium-albuteroL, 3 mL, nebulization, 4x daily  isosorbide mononitrate ER, 30 mg, oral, Daily  lidocaine, 1 patch, transdermal, Daily  methylPREDNISolone sodium succinate (PF), 40 mg, intravenous, q12h  metoprolol tartrate, 50 mg, oral, BID  oxygen, , inhalation, Continuous - Inhalation  pantoprazole, 40 mg, oral, Daily before breakfast  polyethylene glycol, 17 g, oral, Daily  pravastatin, 40 mg, oral, Daily      Continuous medications:       PRN medications:  PRN medications: acetaminophen, acetaminophen, benzocaine-menthol, bisacodyl, hydrOXYzine HCL, ipratropium-albuteroL, melatonin, morphine, morphine     Physical Exam  Constitutional:       General: She is not in acute distress.     Appearance: Normal appearance.      Comments: Elderly, frail  Sitting upright on edge of bed   HENT:      Head: Normocephalic and atraumatic.      Right Ear: External ear normal.      Left Ear: External ear normal.      Nose: Nose normal.      Mouth/Throat:      Mouth: Mucous membranes are moist.      Pharynx: Oropharynx is clear.       Comments: Poor dentition  Eyes:      Extraocular Movements: Extraocular movements intact.      Conjunctiva/sclera: Conjunctivae normal.      Pupils: Pupils are equal, round, and reactive to light.   Cardiovascular:      Rate and Rhythm: Normal rate and regular rhythm.      Pulses: Normal pulses.      Heart sounds: Normal heart sounds.   Pulmonary:      Effort: Pulmonary effort is normal. No respiratory distress.      Breath sounds: No wheezing, rhonchi or rales.      Comments: Breath sounds diminished bilaterally  Appears to have pain with deep breaths  Abdominal:      General: Bowel sounds are normal.      Palpations: Abdomen is soft.      Tenderness: There is no abdominal tenderness. There is no right CVA tenderness, left CVA tenderness, guarding or rebound.   Musculoskeletal:         General: No swelling. Normal range of motion.      Cervical back: Normal range of motion and neck supple.   Skin:     General: Skin is warm and dry.      Capillary Refill: Capillary refill takes less than 2 seconds.      Findings: No lesion or rash.   Neurological:      General: No focal deficit present.      Mental Status: She is alert and oriented to person, place, and time. Mental status is at baseline.   Psychiatric:      Comments: Tangential  Screaming out at inappropriate times                  Code Status  DNR and No Intubation     Assessment/Plan      Acute COPD exacerbation  Patient is placed on Solu-Medrol beta agonist O2 support and doxycycline  Patient has minimal reserves at baseline  Patient is chronically O2 dependent requiring 3 to 4 L continuously, with ambulation patient desats this is no different than patient has been doing for the past 6 months  Pulmonary consultation will be obtained  Try and maximize patient's ambulatory and exertional capabilities but explained to the patient her limitations likely will not improve as she has end-stage emphysema   She has ~130 pack year history (started smoking at age 11  and quit 08/2023, smoking ~2 PPD)   Palliative care consultation- hospice to meet with patient 6/19/24, planning discharge to home with hospice      Adenocarcinoma of the lung  Patient has diagnosis from February but has yet to have any treatment- she has now decided she does not want any treatment  Palliative care consultation  DNR-CCA/DNI- could be good candidate for hospice as she only wishes for comfort measures  Hospice consult placed 6/18/24     Remote history of pulmonary emboli  Patient chronically anticoagulated with Eliquis  No PE on CTA on arrival      Coronary artery disease  Pain is pleuritic  Low suspicion for ACS  Cardiac enzymes negative     Hypertension  Stable on home meds     Social issues  Patient states that she has a neighbor who is helping to take care of her.  She has had different services out to assist her but they cannot provide services as there are not enough staffing  Patient has 2 children that live out of state  Patient is adamant that she does not want to go to a nursing facility    DVT ppx: Eliquis       Please see orders for more complete plan    Reyna Honeycutt PA-C

## 2024-06-19 NOTE — NURSING NOTE
RN Hospice Note    Abigail Aquino is a Hospice Patient.   Hospice terminal diagnosis: Lung cancer  Physician: Beth  Visit type: Initial Visit: Consents signed, Admission date TBD    Comments/recommendations: Met with patient and friends Lexy and Nathen at bedside.  Presented hospice philosophy and services. Discussed many of the patient's frustrations at length.  Primary concern is being able to get glasses and hearing aids.  Explained that these are not typically under the purview of hospice, but social workers may be able to assist patient with the appropriate resources to get them, and hospice can provide the portable oxygen and wheelchair that may be needed for appointments, but cannot guarantee that hospice will take responsibility for making sure she gets the glasses or hearing aids.  Pt was very tearful, said this is all that she wants, she needs help with making phone calls and figuring out who she can see under her various insurance coverages.  Message sent to  for pt home team, Batool NAIR RN, who states there is an audiologist who may make a home visit, will confirm; in either case, social work will follow up once pt is home. Pt inquired how fast SW would see her, the TL said pt would be seen next day.  Pt verbalized understanding. Has O2 in the home, no other urgent DME need prior to discharge.  Plan will be for discharge tomorrow, requested orders be in first AM and [facility] RN to assist with discharge plan finalization/medications/set up transport.    Discharge Planning:  Patient to be discharged to home    The following is to be completed:  Discharge order: yes  State DNR signed by MD: [facility]  Nursing facility referral/transfer form: n/a  Medication reconciliation: yes  PAS/RR or convalescent stay form: n/a  Prescriptions for al narcotics/new medications: yes   Transportation: yes  Other: n/a    Plan of care reviewed with patient/family members Nathen Ambriz (friends)   Plan of care  reviewed with hospital staff members: Marisel Meyer, RN, Xochilt Thomas, SARAH     Please notify [facility] of any changes in condition. Thank you.  Office: [phone] (8 am-6:30 pm M-F and 8 am-4:30 pm weekends and holidays)   [phone] (6:30 pm-8 am M-F and 4:30 pm-8 am weekends and holidays)    Shey Serra, RN

## 2024-06-20 ENCOUNTER — PHARMACY VISIT (OUTPATIENT)
Dept: PHARMACY | Facility: CLINIC | Age: 79
End: 2024-06-20
Payer: COMMERCIAL

## 2024-06-20 ENCOUNTER — APPOINTMENT (OUTPATIENT)
Dept: PULMONOLOGY | Facility: HOSPITAL | Age: 79
End: 2024-06-20
Payer: COMMERCIAL

## 2024-06-20 VITALS
WEIGHT: 130 LBS | HEIGHT: 65 IN | BODY MASS INDEX: 21.66 KG/M2 | DIASTOLIC BLOOD PRESSURE: 67 MMHG | RESPIRATION RATE: 18 BRPM | TEMPERATURE: 97.3 F | HEART RATE: 83 BPM | SYSTOLIC BLOOD PRESSURE: 152 MMHG | OXYGEN SATURATION: 92 %

## 2024-06-20 LAB
ATRIAL RATE: 81 BPM
P AXIS: 77 DEGREES
PR INTERVAL: 135 MS
Q ONSET: 249 MS
QRS COUNT: 13 BEATS
QRS DURATION: 98 MS
QT INTERVAL: 390 MS
QTC CALCULATION(BAZETT): 450 MS
QTC FREDERICIA: 429 MS
R AXIS: 50 DEGREES
T AXIS: 48 DEGREES
T OFFSET: 444 MS
VENTRICULAR RATE: 80 BPM

## 2024-06-20 PROCEDURE — 2500000004 HC RX 250 GENERAL PHARMACY W/ HCPCS (ALT 636 FOR OP/ED): Performed by: PHYSICIAN ASSISTANT

## 2024-06-20 PROCEDURE — 94640 AIRWAY INHALATION TREATMENT: CPT | Mod: MUE

## 2024-06-20 PROCEDURE — 2500000004 HC RX 250 GENERAL PHARMACY W/ HCPCS (ALT 636 FOR OP/ED): Performed by: INTERNAL MEDICINE

## 2024-06-20 PROCEDURE — 97535 SELF CARE MNGMENT TRAINING: CPT | Mod: GO,CO

## 2024-06-20 PROCEDURE — 2500000002 HC RX 250 W HCPCS SELF ADMINISTERED DRUGS (ALT 637 FOR MEDICARE OP, ALT 636 FOR OP/ED): Performed by: INTERNAL MEDICINE

## 2024-06-20 PROCEDURE — 2500000001 HC RX 250 WO HCPCS SELF ADMINISTERED DRUGS (ALT 637 FOR MEDICARE OP): Performed by: INTERNAL MEDICINE

## 2024-06-20 PROCEDURE — 99239 HOSP IP/OBS DSCHRG MGMT >30: CPT | Performed by: PHYSICIAN ASSISTANT

## 2024-06-20 PROCEDURE — 2500000001 HC RX 250 WO HCPCS SELF ADMINISTERED DRUGS (ALT 637 FOR MEDICARE OP): Performed by: PHYSICIAN ASSISTANT

## 2024-06-20 PROCEDURE — 2500000005 HC RX 250 GENERAL PHARMACY W/O HCPCS: Performed by: INTERNAL MEDICINE

## 2024-06-20 PROCEDURE — RXMED WILLOW AMBULATORY MEDICATION CHARGE

## 2024-06-20 RX ORDER — PREDNISONE 10 MG/1
TABLET ORAL
Qty: 30 TABLET | Refills: 0 | Status: SHIPPED | OUTPATIENT
Start: 2024-06-20 | End: 2024-07-01

## 2024-06-20 RX ORDER — HYDROXYZINE HYDROCHLORIDE 25 MG/1
25 TABLET, FILM COATED ORAL EVERY 8 HOURS PRN
Qty: 90 TABLET | Refills: 0 | Status: SHIPPED | OUTPATIENT
Start: 2024-06-20 | End: 2024-07-20

## 2024-06-20 RX ORDER — GUAIFENESIN 600 MG/1
1200 TABLET, EXTENDED RELEASE ORAL 2 TIMES DAILY
Qty: 120 TABLET | Refills: 0 | Status: SHIPPED | OUTPATIENT
Start: 2024-06-20 | End: 2024-07-20

## 2024-06-20 RX ORDER — MORPHINE SULFATE 20 MG/ML
10 SOLUTION ORAL EVERY 4 HOURS PRN
Qty: 30 ML | Refills: 0 | Status: SHIPPED | OUTPATIENT
Start: 2024-06-20

## 2024-06-20 ASSESSMENT — PAIN SCALES - GENERAL
PAINLEVEL_OUTOF10: 8
PAINLEVEL_OUTOF10: 0 - NO PAIN
PAINLEVEL_OUTOF10: 5 - MODERATE PAIN
PAINLEVEL_OUTOF10: 8

## 2024-06-20 ASSESSMENT — ACTIVITIES OF DAILY LIVING (ADL): HOME_MANAGEMENT_TIME_ENTRY: 11

## 2024-06-20 ASSESSMENT — COGNITIVE AND FUNCTIONAL STATUS - GENERAL
WALKING IN HOSPITAL ROOM: A LITTLE
HELP NEEDED FOR BATHING: A LITTLE
DAILY ACTIVITIY SCORE: 21
DAILY ACTIVITIY SCORE: 23
EATING MEALS: A LITTLE
TOILETING: A LITTLE
CLIMB 3 TO 5 STEPS WITH RAILING: A LITTLE
STANDING UP FROM CHAIR USING ARMS: A LITTLE
DRESSING REGULAR LOWER BODY CLOTHING: A LITTLE
MOBILITY SCORE: 21

## 2024-06-20 ASSESSMENT — PAIN DESCRIPTION - ORIENTATION
ORIENTATION: LEFT
ORIENTATION: LEFT

## 2024-06-20 ASSESSMENT — PAIN DESCRIPTION - LOCATION
LOCATION: RIB CAGE
LOCATION: RIB CAGE

## 2024-06-20 ASSESSMENT — PAIN - FUNCTIONAL ASSESSMENT: PAIN_FUNCTIONAL_ASSESSMENT: 0-10

## 2024-06-20 NOTE — PROGRESS NOTES
Occupational Therapy    OT Treatment    Patient Name: Abigail Aquino  MRN: [id number]  Today's Date: 6/20/2024  Time Calculation  Start Time: 1126  Stop Time: 1137  Time Calculation (min): 11 min         Assessment:  End of Session Communication:  (PT entering room to complete eval)  End of Session Patient Position:  (pt sitting EOB upon OT departure. PT and S/PT present in room)     Plan:  Treatment Interventions: ADL retraining, Functional transfer training, Endurance training  OT Frequency: 3 times per week  OT Discharge Recommendations: Low intensity level of continued care  OT - OK to Discharge: Yes  Treatment Interventions: ADL retraining, Functional transfer training, Endurance training    Subjective   Previous Visit Info:  OT Last Visit  OT Received On: 06/20/24  General:  General  Prior to Session Communication: Bedside nurse  Patient Position Received: Bed, 3 rail up, Alarm on  General Comment: pt agreeable to OT tx session  Precautions:     Vital Signs:     Pain:  Pain Assessment  0-10 (Numeric) Pain Score: 0 - No pain    Objective    Cognition:  Cognition  Orientation Level: Oriented X4  Coordination:     Activities of Daily Living: Toileting  Toileting Level of Assistance: Close supervision  Where Assessed: Toilet  Functional Standing Tolerance:  Time: 1-2 min standing bout  Activity: no LOB, cga  Functional Standing Tolerance Comments: tolerated standing fairly, dynamic standing at toilet to manage LB clothing  Bed Mobility/Transfers: Bed Mobility  Bed Mobility: Yes  Bed Mobility 1  Bed Mobility 1: Supine to sitting, Scooting  Level of Assistance 1: Contact guard    Transfers  Transfer: Yes  Transfer 1  Transfer From 1: Sit to  Transfer to 1: Stand  Technique 1: Stand to sit  Transfer Device 1: Walker  Transfer Level of Assistance 1: Contact guard  Trials/Comments 1: x1 from EOB         Functional Mobility:  Functional Mobility  Functional Mobility Performed: Yes  Functional Mobility 1  Comments 1:  pt achieve functional mobility from EOB to personal bathroom with CGA and FWW. Cues for safety and O2 line management required. No LOB, however mild instabillity. Pt declined use of DME  Sitting Balance:  Static Sitting Balance  Static Sitting-Balance Support: Feet supported, Bilateral upper extremity supported  Static Sitting-Level of Assistance: Close supervision  Dynamic Sitting Balance  Dynamic Sitting-Balance Support: Feet supported, No upper extremity supported  Dynamic Sitting-Comments: close supv  Standing Balance:  Static Standing Balance  Static Standing-Balance Support: Right upper extremity supported  Static Standing-Comment/Number of Minutes: pt request hand held assist for standing bouts       Therapy/Activity: Balance/Neuromuscular Re-Education  Balance/Neuromuscular Re-Education Activity Performed: Yes  Balance/Neuromuscular Re-Education Activity 1: pt achieved sitting EOB for 6-7 mins, no LOB      Outcome Measures:[facility] Daily Activity  Putting on and taking off regular lower body clothing: A little  Bathing (including washing, rinsing, drying): A little  Putting on and taking off regular upper body clothing: None  Toileting, which includes using toilet, bedpan or urinal: A little  Taking care of personal grooming such as brushing teeth: None  Eating Meals: None  Daily Activity - Total Score: 21        Education Documentation  ADL Training, taught by CARLOS Bautista at 6/20/2024 12:45 PM.  Learner: Patient  Readiness: Acceptance  Method: Explanation  Response: Verbalizes Understanding, Needs Reinforcement    Education Comments  No comments found.        OP EDUCATION:       Goals:  Encounter Problems       Encounter Problems (Active)       ADLs       Patient will tolerate 25 minutes of ADL/activity while maintaining spO2 >89% and with rest breaks as needed. (Progressing)       Start:  06/17/24    Expected End:  07/01/24               BALANCE       Patient will tolerate standing for 10 minutes to  modified independent level of assistance with least restrictive device in order to improve functional activity tolerance for ADL tasks. (Progressing)       Start:  06/17/24    Expected End:  07/01/24               VISION       Patient will navigate environments with high visual stimuli safely Without loss of balance and Attending to obstacles with modified independent level of assistance. (Progressing)       Start:  06/17/24    Expected End:  07/01/24

## 2024-06-20 NOTE — CARE PLAN
The patient's goals for the shift include less sob    The clinical goals for the shift include Pt. will remain comfortable throughout this shift and be discharged home on hospice.      Problem: Fall/Injury  Goal: Not fall by end of shift  Outcome: Progressing  Goal: Be free from injury by end of the shift  Outcome: Progressing  Goal: Verbalize understanding of personal risk factors for fall in the hospital  Outcome: Progressing  Goal: Verbalize understanding of risk factor reduction measures to prevent injury from fall in the home  Outcome: Progressing  Goal: Use assistive devices by end of the shift  Outcome: Progressing  Goal: Pace activities to prevent fatigue by end of the shift  Outcome: Progressing     Problem: Pain  Goal: Takes deep breaths with improved pain control throughout the shift  Outcome: Progressing  Goal: Turns in bed with improved pain control throughout the shift  Outcome: Progressing  Goal: Walks with improved pain control throughout the shift  Outcome: Progressing  Goal: Performs ADL's with improved pain control throughout shift  Outcome: Progressing  Goal: Participates in PT with improved pain control throughout the shift  Outcome: Progressing  Goal: Free from opioid side effects throughout the shift  Outcome: Progressing  Goal: Free from acute confusion related to pain meds throughout the shift  Outcome: Progressing     Problem: Pain - Adult  Goal: Verbalizes/displays adequate comfort level or baseline comfort level  Outcome: Progressing     Problem: Safety - Adult  Goal: Free from fall injury  Outcome: Progressing     Problem: Discharge Planning  Goal: Discharge to home or other facility with appropriate resources  Outcome: Progressing     Problem: Chronic Conditions and Co-morbidities  Goal: Patient's chronic conditions and co-morbidity symptoms are monitored and maintained or improved  Outcome: Progressing

## 2024-06-20 NOTE — NURSING NOTE
Patient given discharge instructions. Discharge information was reviewed with patient by [facility] nurse. Patient is to be discharged home by [facility] with hospice of . Medications delivered by  pharmacy. IV removed. Patient to be sent home with all belongings. All questions answered.

## 2024-06-20 NOTE — PROGRESS NOTES
"Physical Therapy                 Therapy Communication Note    Patient Name: Abigail Aquino  MRN: [id number]  Today's Date: 6/20/2024     Discipline: Physical Therapy    Missed Visit Reason: Missed Visit Reason: Patient refused    Comment: SPT joined MENESES who began tx session to attempt evaluation after pt completed toileting. Pt was able to ambulate to bathroom without device and SBA with increased time to complete, shortened stride length, and decreased heel strike MELISA. Upon completion of toileting, pt returned to EOB and became increasingly agitated. Pt verbalized frustration with care providers \"not listening\" and constantly on a time constraint. Pt reports she will be receiving ambulatory transport to home upon discharge and has no concerns about ability to enter into home, her primary concern was reported to be the heat \"sucking the air out of my lungs and causing me to panic\" and feeling anxiety which she is unable to control. Pt was tangential and despite attempts at empathetic listening was unable to return to task and complete PT eval.     RIZWAN TURK, S-PT    "

## 2024-06-20 NOTE — CARE PLAN
Problem: Fall/Injury  Goal: Be free from injury by end of the shift  Outcome: Progressing     Problem: Pain  Goal: Turns in bed with improved pain control throughout the shift  Outcome: Progressing     Problem: Safety - Adult  Goal: Free from fall injury  Outcome: Progressing     Problem: Discharge Planning  Goal: Discharge to home or other facility with appropriate resources  Outcome: Progressing   The patient's goals for the shift include less sob    The clinical goals for the shift include Patient will remain free from falls and injury througout shift

## 2024-06-20 NOTE — DISCHARGE SUMMARY
Admission Date: 6/16/2024 10:40 PM  Discharge Date: 06/20/24   Condition at discharge: Serious    Discharge Diagnosis  AECOPD  Acute on chronic hypoxic and hypercarbic respiratory failure (3-4L NC)  Adenocarcinoma of LLL- dx 02/2024, does not want any treatment   Remote PE on Eliquis  CAD  HTN  Unable to care for self    Test Results Pending At Discharge  Pending Labs       No current pending labs.            Hospital Course   Abigail Aquino is a 79 y.o. female with past medical history of O2 dependent COPD/emphysema, coronary artery disease, adenocarcinoma of the left lung, remote pulmonary emboli, hypertension, dyslipidemia presented 6/16/24 with increasing shortness of breath.  Patient states that she has intermittent episodes where she just will desat into the 70s at home with minimal exertion.  She is able to rest and recover.  She states that the last day and a half she has noted wheezing and increasing shortness of breath and less endurance.  She denies having any chest pain with this she denies having any fevers or chills.  Patient does have a cough.  In the emergency room patient on her 4 L nasal cannula satting sats in the mid 90s.  Patient will drop down into the low 80s with exertion.  BMP has a potassium of 3.2 otherwise is unremarkable.  Patient's BNP is 226 troponin of 7 with repeat of 7. Venous gas shows pH of 7.36 with a pCO2 of 74 which is patient's baseline.  Chest x-ray shows slight increased size of nodular opacity in the left lower lobe corresponding to patient's known malignancy otherwise her lungs are clear.  Given patient's hypoxia patient had a CT angiogram obtained that shows no significant change with patient having known pulmonary mass now is 3.4 cm. She endorses DNR-CCA/DNI. She met with palliative care services and has determined she does not want any treatment for her cancer   She did speak with Dr. Wooten, she would like a hospice consult. She met with [facility] 6/20/24 and signed on  with hospice services. She is still agitated about the lack of help she will have at home as she lives alone but otherwise understands the medications she is being sent home with, Pharm-D did spend time with her after our visit to go over all medications and any medication related questions she had. She expressed no further questions at end of my evaluation although still seemed agitated and frustrated.     Consultations: Palliative Care consulted- treatment options were discussed and plan of care agreed upon. and Pulmonology consulted- treatment options were discussed and plan of care agreed upon.    Pertinent Physical Exam At Time of Discharge  Constitutional:       General: She is not in acute distress, speaking in full sentences.     Appearance: Normal appearance.      Comments: Elderly, frail  Sitting upright in bed   HENT:      Head: Normocephalic and atraumatic.      Right Ear: External ear normal.      Left Ear: External ear normal.      Nose: Nose normal.      Mouth/Throat:      Mouth: Mucous membranes are moist.      Pharynx: Oropharynx is clear.      Comments: Poor dentition  Eyes:      Extraocular Movements: Extraocular movements intact.      Conjunctiva/sclera: Conjunctivae normal.   Pulmonary:      Effort: Pulmonary effort is normal. No respiratory distress.      Comments: Breath sounds diminished bilaterally  Appears to have pain with deep breaths  Musculoskeletal:         General: No swelling. Normal range of motion.      Cervical back: Normal range of motion and neck supple.   Skin:     General: Skin is warm and dry.      Findings: No lesion or rash on unclothed areas of skin.   Neurological:      General: No focal deficit present.      Mental Status: She is alert. Mental status appears at baseline.   Psychiatric:      Comments: Tangential, easily agitated    Code Status  DNR and No Intubation     Home Medications     Medication List      START taking these medications     guaiFENesin 600 mg 12 hr  tablet; Commonly known as: Mucinex; Take 2   tablets (1,200 mg) by mouth 2 times a day.   hydrOXYzine HCL 25 mg tablet; Commonly known as: Atarax; Take 1 tablet   (25 mg) by mouth every 8 hours if needed for anxiety.   morphine 20 mg/mL concentrated oral solution; Take 0.5 mL (10 mg) by   mouth every 4 hours if needed for severe pain (7 - 10), shortness of   breath or discomfort.   predniSONE 10 mg tablet; Commonly known as: Deltasone; Take 4 tablets   (40 mg) by mouth once daily for 3 days, THEN 3 tablets (30 mg) once daily   for 3 days, THEN 2 tablets (20 mg) once daily for 3 days, THEN 1 tablet   (10 mg) once daily for 3 days.; Start taking on: June 20, 2024     CHANGE how you take these medications     * ipratropium-albuteroL 0.5-2.5 mg/3 mL nebulizer solution; Commonly   known as: Duo-Neb; Take 3 mL by nebulization 4 times a day as needed for   wheezing or shortness of breath.; What changed: Another medication with   the same name was added. Make sure you understand how and when to take   each.   * ipratropium-albuteroL 0.5-2.5 mg/3 mL nebulizer solution; Commonly   known as: Duo-Neb; Take 3 mL by nebulization every 6 hours.; What changed:   You were already taking a medication with the same name, and this   prescription was added. Make sure you understand how and when to take   each.  * This list has 2 medication(s) that are the same as other medications   prescribed for you. Read the directions carefully, and ask your doctor or   other care provider to review them with you.     CONTINUE taking these medications     albuterol 90 mcg/actuation inhaler; INHALE 2 PUFFS BY MOUTH FOUR TIMES A   DAY AS NEEDED FOR SHORTNESS OF BREATH   budesonide-glycopyr-formoterol 160-9-4.8 mcg/actuation HFA aerosol   inhaler; Commonly known as: BREZTRI; Inhale 2 puffs 2 times a day.   Eliquis 5 mg tablet; Generic drug: apixaban   furosemide 20 mg tablet; Commonly known as: Lasix; TAKE 1 TABLET BY   MOUTH ONCE DAILY   isosorbide  mononitrate ER 30 mg 24 hr tablet; Commonly known as: Imdur   lidocaine 5 % patch; Commonly known as: Lidoderm; Place 1 patch on the   skin once daily. Leave on for 12 hours, remove for 12 hours   LORazepam 0.5 mg tablet; Commonly known as: Ativan; Take 1 tablet (0.5   mg) by mouth 1 time for 1 dose. Take 1-2 tablets by mouth about 30 minutes   before your scan   metoprolol tartrate 50 mg tablet; Commonly known as: Lopressor   pravastatin 40 mg tablet; Commonly known as: Pravachol       Outpatient Follow-Up  Future Appointments   Date Time Provider Department Center   6/26/2024 11:30 AM Lydia Madden, APRN-CNP [id number] [facility]   6/27/2024 11:00 AM [facility] SIMULATOR [id number] [facility]   7/11/2024  2:00 PM Vikki Marquez MD [id number] [facility]         At the time of discharge, patient's pain was controlled with oral analgesia, patient was urinating, having BMs, sleeping, and eating well. Follow up recommendations are in discharge paperwork. Discharge plan was discussed with the patient/family and all of the questions were answered. Medications were ordered to be delivered to bedside prior to discharge.     Discharge planning took greater than 35 minutes    Diagnoses at time of discharge:  AECOPD  Acute on chronic hypoxic and hypercarbic respiratory failure (3-4L NC)  Adenocarcinoma of LLL- dx 02/2024, does not want any treatment   Remote PE on Eliquis  CAD  HTN  Unable to care for self    Anticipated discharge destination: home with [facility]    Please see orders for more complete plan    Reyna Honeycutt PA-C

## 2024-06-21 ENCOUNTER — TELEPHONE (OUTPATIENT)
Dept: PRIMARY CARE | Facility: CLINIC | Age: 79
End: 2024-06-21
Payer: COMMERCIAL

## 2024-06-21 NOTE — TELEPHONE ENCOUNTER
Requested all her medication refilled.   I only prescribe the Lisinopril.  It appears she is getting palliative care now. We need to clarify if palliative doctor is taking care of medications now.  Pulmonology does the inhalers and cardiology does the rest except lisinopril.

## 2024-06-26 ENCOUNTER — APPOINTMENT (OUTPATIENT)
Dept: CARDIOLOGY | Facility: CLINIC | Age: 79
End: 2024-06-26
Payer: COMMERCIAL

## 2024-06-27 ENCOUNTER — APPOINTMENT (OUTPATIENT)
Dept: RADIATION ONCOLOGY | Facility: HOSPITAL | Age: 79
End: 2024-06-27
Payer: COMMERCIAL

## 2024-06-27 ENCOUNTER — HOSPITAL ENCOUNTER (OUTPATIENT)
Dept: RADIOLOGY | Facility: EXTERNAL LOCATION | Age: 79
Discharge: HOME | End: 2024-06-27

## 2024-06-27 DIAGNOSIS — C34.32 PRIMARY MALIGNANT NEOPLASM OF BRONCHUS OF LEFT LOWER LOBE (MULTI): Primary | ICD-10-CM

## 2024-06-27 DIAGNOSIS — C34.32 PRIMARY MALIGNANT NEOPLASM OF BRONCHUS OF LEFT LOWER LOBE (MULTI): ICD-10-CM

## 2024-06-28 NOTE — TELEPHONE ENCOUNTER
Annemarie Hayes.  She let us know that she is on [facility] now.  They are taking care of everything. She want to thank Dr. Lancaster and all of her staff.  I made Dr. Marquez aware.

## 2024-07-01 ENCOUNTER — APPOINTMENT (OUTPATIENT)
Dept: GASTROENTEROLOGY | Facility: HOSPITAL | Age: 79
End: 2024-07-01
Payer: COMMERCIAL

## 2024-07-11 ENCOUNTER — APPOINTMENT (OUTPATIENT)
Dept: PRIMARY CARE | Facility: CLINIC | Age: 79
End: 2024-07-11
Payer: COMMERCIAL

## 2024-08-03 ENCOUNTER — HOSPITAL ENCOUNTER (EMERGENCY)
Facility: HOSPITAL | Age: 79
Discharge: NURSING, REHAB OR LONG-TERM CARE | End: 2024-08-03
Attending: EMERGENCY MEDICINE
Payer: COMMERCIAL

## 2024-08-03 VITALS
HEART RATE: 61 BPM | RESPIRATION RATE: 12 BRPM | WEIGHT: 118.8 LBS | BODY MASS INDEX: 19.79 KG/M2 | HEIGHT: 65 IN | TEMPERATURE: 97.5 F | OXYGEN SATURATION: 100 % | SYSTOLIC BLOOD PRESSURE: 93 MMHG | DIASTOLIC BLOOD PRESSURE: 49 MMHG

## 2024-08-03 DIAGNOSIS — R53.1 WEAKNESS: Primary | ICD-10-CM

## 2024-08-03 DIAGNOSIS — C34.90 MALIGNANT NEOPLASM OF LUNG, UNSPECIFIED LATERALITY, UNSPECIFIED PART OF LUNG (MULTI): ICD-10-CM

## 2024-08-03 PROCEDURE — 99281 EMR DPT VST MAYX REQ PHY/QHP: CPT

## 2024-08-03 RX ORDER — MORPHINE SULFATE ORAL SOLUTION 10 MG/5ML
10 SOLUTION ORAL EVERY 2 HOUR PRN
Status: DISCONTINUED | OUTPATIENT
Start: 2024-08-03 | End: 2024-08-03 | Stop reason: HOSPADM

## 2024-08-03 RX ORDER — METHADONE HYDROCHLORIDE 5 MG/1
5 TABLET ORAL EVERY 12 HOURS PRN
Status: DISCONTINUED | OUTPATIENT
Start: 2024-08-03 | End: 2024-08-03 | Stop reason: HOSPADM

## 2024-08-03 RX ORDER — MORPHINE SULFATE 10 MG/.5ML
10 SOLUTION ORAL EVERY 2 HOUR PRN
Status: DISCONTINUED | OUTPATIENT
Start: 2024-08-03 | End: 2024-08-03

## 2024-08-03 ASSESSMENT — LIFESTYLE VARIABLES
HAVE PEOPLE ANNOYED YOU BY CRITICIZING YOUR DRINKING: NO
EVER FELT BAD OR GUILTY ABOUT YOUR DRINKING: NO
TOTAL SCORE: 0
EVER HAD A DRINK FIRST THING IN THE MORNING TO STEADY YOUR NERVES TO GET RID OF A HANGOVER: NO
HAVE YOU EVER FELT YOU SHOULD CUT DOWN ON YOUR DRINKING: NO

## 2024-08-03 ASSESSMENT — PAIN - FUNCTIONAL ASSESSMENT: PAIN_FUNCTIONAL_ASSESSMENT: 0-10

## 2024-08-03 ASSESSMENT — PAIN SCALES - GENERAL: PAINLEVEL_OUTOF10: 0 - NO PAIN

## 2024-08-03 NOTE — ED PROVIDER NOTES
HPI   Chief Complaint   Patient presents with    Fall       Patient presents to the emergency department secondary to a fall.  Patient has fallen 3 times this past week.  She fell at some point last night out of the chair.  She has no injury from the fall but she was unable to get herself off the floor.  Paramedics were called this morning.  She has no pain.  No headache or neck pain.  No chest pain or shortness of breath.  No abdominal pain.  No nausea or vomiting.  Patient is in hospice.  She has a history of lung cancer.  She has not contacted the [employer] company that she is in the emergency department.  She currently has no complaints.                          Paola Coma Scale Score: 15                  Patient History   Past Medical History:   Diagnosis Date    Clotting disorder (Multi)     COPD (chronic obstructive pulmonary disease) (Multi)     Eyebrow laceration, right, sequela 10/04/2023    Fracture tibia/fibula, right, sequela 10/11/2021    Hypertension     Kidney stones 2017    Solitary pulmonary nodule 2023     Past Surgical History:   Procedure Laterality Date    APPENDECTOMY      CT GUIDED PERCUTANEOUS BIOPSY LUNG  2023    CT GUIDED PERCUTANEOUS BIOPSY LUNG 2023 POR CT    TONSILLECTOMY       Family History   Problem Relation Name Age of Onset    Cancer Daughter       Social History     Tobacco Use    Smoking status: Former     Current packs/day: 0.00     Types: Cigarettes     Quit date: 2023     Years since quittin.9     Passive exposure: Past    Smokeless tobacco: Never   Vaping Use    Vaping status: Never Used   Substance Use Topics    Alcohol use: Never    Drug use: Never       Physical Exam   ED Triage Vitals   Temperature Heart Rate Respirations BP   24 1226 24 1226 24 1226 24 1225   36.4 °C (97.5 °F) 79 16 (!) 122/103      Pulse Ox Temp src Heart Rate Source Patient Position   24 1226 -- -- --   95 %         BP Location FiO2 (%)      -- --             Physical Exam  Vitals and nursing note reviewed.   Constitutional:       Appearance: Normal appearance.   HENT:      Head: Normocephalic and atraumatic.      Right Ear: Tympanic membrane normal.      Left Ear: Tympanic membrane normal.      Nose: Nose normal.      Mouth/Throat:      Mouth: Mucous membranes are moist.   Eyes:      Extraocular Movements: Extraocular movements intact.      Pupils: Pupils are equal, round, and reactive to light.   Cardiovascular:      Rate and Rhythm: Normal rate and regular rhythm.      Pulses: Normal pulses.      Heart sounds: Normal heart sounds.   Pulmonary:      Effort: Pulmonary effort is normal.      Breath sounds: Normal breath sounds.   Abdominal:      General: Abdomen is flat. Bowel sounds are normal.      Palpations: Abdomen is soft.      Tenderness: There is no abdominal tenderness. There is no guarding or rebound.   Musculoskeletal:         General: Normal range of motion.      Cervical back: Normal range of motion.      Right lower leg: Edema present.      Left lower leg: Edema present.      Comments: Patient has 1+ bilateral lower extremity edema with mild weeping.  No evidence of cellulitis or open wounds.   Skin:     General: Skin is warm.      Capillary Refill: Capillary refill takes less than 2 seconds.   Neurological:      General: No focal deficit present.      Mental Status: She is alert and oriented to person, place, and time.   Psychiatric:         Mood and Affect: Mood normal.         Behavior: Behavior normal.       Labs Reviewed - No data to display  Pain Management Panel           No data to display              No orders to display     ED Course & MDM   Diagnoses as of 08/03/24 2015   Weakness   Malignant neoplasm of lung, unspecified laterality, unspecified part of lung (Multi)       Medical Decision Making  Patient would like to maintain hospice care at this time.  She was placed on her home oxygen.  Hospice is contacted about keeping  the patient safe at this time.  We are currently awaiting a plan of care for the patient.  No imaging is ordered because patient has no injury complaints from the fall.    1610  Patient was seen by hospice in the emergency department.  Her as needed pain medications were ordered.  This time they are working on trying to place her in a hospice facility.  Patient was able to be placed in a hospice facility and was discharged at 730 to that facility.        Procedure  Procedures     Stephanie Maurer MD  08/03/24 2016

## 2024-08-03 NOTE — ED TRIAGE NOTES
Fall round 0230 this am, ems reported pt was on the ground and had been hearing voices throughout the night and took eliquis. Pt states she has not taken eliquis in 2 days and Dr Maurer states to stand down the HIA.